# Patient Record
Sex: FEMALE | Race: WHITE | NOT HISPANIC OR LATINO | URBAN - METROPOLITAN AREA
[De-identification: names, ages, dates, MRNs, and addresses within clinical notes are randomized per-mention and may not be internally consistent; named-entity substitution may affect disease eponyms.]

---

## 2019-02-11 ENCOUNTER — EMERGENCY (EMERGENCY)
Facility: HOSPITAL | Age: 64
LOS: 1 days | Discharge: HOME | End: 2019-02-11
Attending: EMERGENCY MEDICINE | Admitting: EMERGENCY MEDICINE
Payer: MEDICARE

## 2019-02-11 VITALS
OXYGEN SATURATION: 100 % | TEMPERATURE: 98 F | RESPIRATION RATE: 16 BRPM | HEART RATE: 88 BPM | SYSTOLIC BLOOD PRESSURE: 154 MMHG | DIASTOLIC BLOOD PRESSURE: 81 MMHG

## 2019-02-11 VITALS
OXYGEN SATURATION: 99 % | RESPIRATION RATE: 16 BRPM | HEART RATE: 99 BPM | HEIGHT: 62 IN | WEIGHT: 117.95 LBS | SYSTOLIC BLOOD PRESSURE: 137 MMHG | TEMPERATURE: 98 F | DIASTOLIC BLOOD PRESSURE: 85 MMHG

## 2019-02-11 DIAGNOSIS — Z96.649 PRESENCE OF UNSPECIFIED ARTIFICIAL HIP JOINT: Chronic | ICD-10-CM

## 2019-02-11 LAB
ALBUMIN SERPL ELPH-MCNC: 3.6 G/DL — SIGNIFICANT CHANGE UP (ref 3.3–5)
ALP SERPL-CCNC: 88 U/L — SIGNIFICANT CHANGE UP (ref 40–120)
ALT FLD-CCNC: 13 U/L — SIGNIFICANT CHANGE UP (ref 12–78)
ANION GAP SERPL CALC-SCNC: 7 MMOL/L — SIGNIFICANT CHANGE UP (ref 5–17)
APTT BLD: 30.2 SEC — SIGNIFICANT CHANGE UP (ref 28.5–37)
AST SERPL-CCNC: 6 U/L — LOW (ref 15–37)
BASOPHILS # BLD AUTO: 0.01 K/UL — SIGNIFICANT CHANGE UP (ref 0–0.2)
BASOPHILS NFR BLD AUTO: 0.1 % — SIGNIFICANT CHANGE UP (ref 0–2)
BILIRUB SERPL-MCNC: 0.4 MG/DL — SIGNIFICANT CHANGE UP (ref 0.2–1.2)
BUN SERPL-MCNC: 10 MG/DL — SIGNIFICANT CHANGE UP (ref 7–23)
CALCIUM SERPL-MCNC: 8.4 MG/DL — LOW (ref 8.5–10.1)
CHLORIDE SERPL-SCNC: 95 MMOL/L — LOW (ref 96–108)
CK MB CFR SERPL CALC: <1 NG/ML — SIGNIFICANT CHANGE UP (ref 0–3.6)
CO2 SERPL-SCNC: 29 MMOL/L — SIGNIFICANT CHANGE UP (ref 22–31)
CREAT SERPL-MCNC: 0.63 MG/DL — SIGNIFICANT CHANGE UP (ref 0.5–1.3)
D DIMER BLD IA.RAPID-MCNC: <150 NG/ML DDU — SIGNIFICANT CHANGE UP
EOSINOPHIL # BLD AUTO: 0.06 K/UL — SIGNIFICANT CHANGE UP (ref 0–0.5)
EOSINOPHIL NFR BLD AUTO: 0.7 % — SIGNIFICANT CHANGE UP (ref 0–6)
GLUCOSE SERPL-MCNC: 95 MG/DL — SIGNIFICANT CHANGE UP (ref 70–99)
HCT VFR BLD CALC: 35.4 % — SIGNIFICANT CHANGE UP (ref 34.5–45)
HGB BLD-MCNC: 11.5 G/DL — SIGNIFICANT CHANGE UP (ref 11.5–15.5)
IMM GRANULOCYTES NFR BLD AUTO: 0.5 % — SIGNIFICANT CHANGE UP (ref 0–1.5)
INR BLD: 1.07 RATIO — SIGNIFICANT CHANGE UP (ref 0.88–1.16)
LACTATE SERPL-SCNC: 1.7 MMOL/L — SIGNIFICANT CHANGE UP (ref 0.7–2)
LYMPHOCYTES # BLD AUTO: 1.38 K/UL — SIGNIFICANT CHANGE UP (ref 1–3.3)
LYMPHOCYTES # BLD AUTO: 15.6 % — SIGNIFICANT CHANGE UP (ref 13–44)
MCHC RBC-ENTMCNC: 28.5 PG — SIGNIFICANT CHANGE UP (ref 27–34)
MCHC RBC-ENTMCNC: 32.5 GM/DL — SIGNIFICANT CHANGE UP (ref 32–36)
MCV RBC AUTO: 87.6 FL — SIGNIFICANT CHANGE UP (ref 80–100)
MONOCYTES # BLD AUTO: 0.86 K/UL — SIGNIFICANT CHANGE UP (ref 0–0.9)
MONOCYTES NFR BLD AUTO: 9.7 % — SIGNIFICANT CHANGE UP (ref 2–14)
NEUTROPHILS # BLD AUTO: 6.51 K/UL — SIGNIFICANT CHANGE UP (ref 1.8–7.4)
NEUTROPHILS NFR BLD AUTO: 73.4 % — SIGNIFICANT CHANGE UP (ref 43–77)
NRBC # BLD: 0 /100 WBCS — SIGNIFICANT CHANGE UP (ref 0–0)
NT-PROBNP SERPL-SCNC: 363 PG/ML — HIGH (ref 0–125)
PLATELET # BLD AUTO: 440 K/UL — HIGH (ref 150–400)
POTASSIUM SERPL-MCNC: 4.5 MMOL/L — SIGNIFICANT CHANGE UP (ref 3.5–5.3)
POTASSIUM SERPL-SCNC: 4.5 MMOL/L — SIGNIFICANT CHANGE UP (ref 3.5–5.3)
PROT SERPL-MCNC: 7.6 G/DL — SIGNIFICANT CHANGE UP (ref 6–8.3)
PROTHROM AB SERPL-ACNC: 12.2 SEC — SIGNIFICANT CHANGE UP (ref 10–12.9)
RBC # BLD: 4.04 M/UL — SIGNIFICANT CHANGE UP (ref 3.8–5.2)
RBC # FLD: 12.5 % — SIGNIFICANT CHANGE UP (ref 10.3–14.5)
SODIUM SERPL-SCNC: 131 MMOL/L — LOW (ref 135–145)
TROPONIN I SERPL-MCNC: <.015 NG/ML — SIGNIFICANT CHANGE UP (ref 0.01–0.04)
WBC # BLD: 8.86 K/UL — SIGNIFICANT CHANGE UP (ref 3.8–10.5)
WBC # FLD AUTO: 8.86 K/UL — SIGNIFICANT CHANGE UP (ref 3.8–10.5)

## 2019-02-11 PROCEDURE — 85027 COMPLETE CBC AUTOMATED: CPT

## 2019-02-11 PROCEDURE — 84484 ASSAY OF TROPONIN QUANT: CPT

## 2019-02-11 PROCEDURE — 93010 ELECTROCARDIOGRAM REPORT: CPT

## 2019-02-11 PROCEDURE — 85610 PROTHROMBIN TIME: CPT

## 2019-02-11 PROCEDURE — 83880 ASSAY OF NATRIURETIC PEPTIDE: CPT

## 2019-02-11 PROCEDURE — 87040 BLOOD CULTURE FOR BACTERIA: CPT

## 2019-02-11 PROCEDURE — 99284 EMERGENCY DEPT VISIT MOD MDM: CPT | Mod: 25

## 2019-02-11 PROCEDURE — 71046 X-RAY EXAM CHEST 2 VIEWS: CPT

## 2019-02-11 PROCEDURE — 82553 CREATINE MB FRACTION: CPT

## 2019-02-11 PROCEDURE — 83605 ASSAY OF LACTIC ACID: CPT

## 2019-02-11 PROCEDURE — 85379 FIBRIN DEGRADATION QUANT: CPT

## 2019-02-11 PROCEDURE — 80053 COMPREHEN METABOLIC PANEL: CPT

## 2019-02-11 PROCEDURE — 85730 THROMBOPLASTIN TIME PARTIAL: CPT

## 2019-02-11 PROCEDURE — 71046 X-RAY EXAM CHEST 2 VIEWS: CPT | Mod: 26

## 2019-02-11 PROCEDURE — 94640 AIRWAY INHALATION TREATMENT: CPT

## 2019-02-11 PROCEDURE — 99284 EMERGENCY DEPT VISIT MOD MDM: CPT

## 2019-02-11 PROCEDURE — 93005 ELECTROCARDIOGRAM TRACING: CPT

## 2019-02-11 PROCEDURE — 36415 COLL VENOUS BLD VENIPUNCTURE: CPT

## 2019-02-11 RX ORDER — DEXTROMETHORPHAN HYDROBROMIDE AND PROMETHAZINE HYDROCHLORIDE 15; 6.25 MG/5ML; MG/5ML
5 SYRUP ORAL
Qty: 100 | Refills: 0
Start: 2019-02-11 | End: 2019-02-15

## 2019-02-11 RX ORDER — IPRATROPIUM/ALBUTEROL SULFATE 18-103MCG
3 AEROSOL WITH ADAPTER (GRAM) INHALATION ONCE
Qty: 0 | Refills: 0 | Status: COMPLETED | OUTPATIENT
Start: 2019-02-11 | End: 2019-02-11

## 2019-02-11 RX ORDER — OMEPRAZOLE 10 MG/1
1 CAPSULE, DELAYED RELEASE ORAL
Qty: 30 | Refills: 0
Start: 2019-02-11 | End: 2019-03-12

## 2019-02-11 RX ORDER — ALPRAZOLAM 0.25 MG
1 TABLET ORAL
Qty: 10 | Refills: 0 | OUTPATIENT
Start: 2019-02-11 | End: 2019-02-15

## 2019-02-11 RX ORDER — SODIUM CHLORIDE 9 MG/ML
1000 INJECTION INTRAMUSCULAR; INTRAVENOUS; SUBCUTANEOUS ONCE
Qty: 0 | Refills: 0 | Status: COMPLETED | OUTPATIENT
Start: 2019-02-11 | End: 2019-02-11

## 2019-02-11 RX ADMIN — Medication 3 MILLILITER(S): at 13:33

## 2019-02-11 RX ADMIN — SODIUM CHLORIDE 1000 MILLILITER(S): 9 INJECTION INTRAMUSCULAR; INTRAVENOUS; SUBCUTANEOUS at 13:33

## 2019-02-11 NOTE — ED PROVIDER NOTE - PROGRESS NOTE DETAILS
patient resting comfortably in bed, oral pharynx clear, lungs clear, labs, chest xray reviwed, no acute findings, dc home, f/u with PMD

## 2019-02-11 NOTE — ED ADULT NURSE NOTE - OBJECTIVE STATEMENT
Pt presents to the ED s/p cough. Pt is having to continuously spit in tissues, states" I feel like I have to do this." Pt denies coughing, difficulty breathing , difficulty swallowing.

## 2019-02-11 NOTE — ED PROVIDER NOTE - CARE PLAN
Principal Discharge DX:	Sputum production Principal Discharge DX:	Sputum production  Secondary Diagnosis:	Gastroesophageal reflux disease, esophagitis presence not specified

## 2019-02-11 NOTE — ED PROVIDER NOTE - OBJECTIVE STATEMENT
63 female presents to ER c/o spitting up sputum, states last week went to urgent care for cold like symptoms, prescribed z-pack, taking mucinex DM, flonase, states her congestion improved but still spitting up sputum. Denies fever, no shortness of breath.

## 2019-02-11 NOTE — ED ADULT NURSE NOTE - NSIMPLEMENTINTERV_GEN_ALL_ED
Implemented All Fall Risk Interventions:  [place] to call system. Call bell, personal items and telephone within reach. Instruct patient to call for assistance. Room bathroom lighting operational. Non-slip footwear when patient is off stretcher. Physically safe environment: no spills, clutter or unnecessary equipment. Stretcher in lowest position, wheels locked, appropriate side rails in place. Provide visual cue, wrist band, yellow gown, etc. Monitor gait and stability. Monitor for mental status changes and reorient to person, place, and time. Review medications for side effects contributing to fall risk. Reinforce activity limits and safety measures with patient and family.

## 2019-02-16 LAB
CULTURE RESULTS: SIGNIFICANT CHANGE UP
CULTURE RESULTS: SIGNIFICANT CHANGE UP
SPECIMEN SOURCE: SIGNIFICANT CHANGE UP
SPECIMEN SOURCE: SIGNIFICANT CHANGE UP

## 2019-03-18 NOTE — ED ADULT TRIAGE NOTE - HEIGHT IN CM
[facility] is calling to request copy of pre op with any labs and/or EKG done 03/11/19, FAX: [phone]. Thank  You.  
Pre-Op has been faxed to [facility] at [phone]. Praveena Sawant TC    
[phone]

## 2021-10-23 ENCOUNTER — INPATIENT (INPATIENT)
Facility: HOSPITAL | Age: 66
LOS: 3 days | Discharge: ANOTHER HOSPITAL | DRG: 552 | End: 2021-10-27
Attending: FAMILY MEDICINE | Admitting: FAMILY MEDICINE
Payer: MEDICARE

## 2021-10-23 VITALS
RESPIRATION RATE: 18 BRPM | WEIGHT: 119.93 LBS | HEIGHT: 62 IN | DIASTOLIC BLOOD PRESSURE: 77 MMHG | HEART RATE: 90 BPM | TEMPERATURE: 99 F | SYSTOLIC BLOOD PRESSURE: 153 MMHG | OXYGEN SATURATION: 98 %

## 2021-10-23 DIAGNOSIS — M54.9 DORSALGIA, UNSPECIFIED: ICD-10-CM

## 2021-10-23 DIAGNOSIS — Z96.649 PRESENCE OF UNSPECIFIED ARTIFICIAL HIP JOINT: Chronic | ICD-10-CM
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
## 2021-10-23 DIAGNOSIS — G40.909 EPILEPSY, UNSPECIFIED, NOT INTRACTABLE, WITHOUT STATUS EPILEPTICUS: ICD-10-CM

## 2021-10-23 DIAGNOSIS — Z85.841 PERSONAL HISTORY OF MALIGNANT NEOPLASM OF BRAIN: ICD-10-CM

## 2021-10-23 DIAGNOSIS — S32.009A UNSPECIFIED FRACTURE OF UNSPECIFIED LUMBAR VERTEBRA, INITIAL ENCOUNTER FOR CLOSED FRACTURE: ICD-10-CM

## 2021-10-23 PROBLEM — C43.9 MALIGNANT MELANOMA OF SKIN, UNSPECIFIED: Chronic | Status: ACTIVE | Noted: 2019-02-11

## 2021-10-23 PROBLEM — C50.919 MALIGNANT NEOPLASM OF UNSPECIFIED SITE OF UNSPECIFIED FEMALE BREAST: Chronic | Status: ACTIVE | Noted: 2019-02-11

## 2021-10-23 PROBLEM — R56.9 UNSPECIFIED CONVULSIONS: Chronic | Status: ACTIVE | Noted: 2019-02-11

## 2021-10-23 PROBLEM — C71.9 MALIGNANT NEOPLASM OF BRAIN, UNSPECIFIED: Chronic | Status: ACTIVE | Noted: 2019-02-11

## 2021-10-23 LAB
ALBUMIN SERPL ELPH-MCNC: 3.3 G/DL — SIGNIFICANT CHANGE UP (ref 3.3–5)
ALP SERPL-CCNC: 121 U/L — HIGH (ref 40–120)
ALT FLD-CCNC: 20 U/L — SIGNIFICANT CHANGE UP (ref 12–78)
ANION GAP SERPL CALC-SCNC: 5 MMOL/L — SIGNIFICANT CHANGE UP (ref 5–17)
APPEARANCE UR: CLEAR — SIGNIFICANT CHANGE UP
AST SERPL-CCNC: 22 U/L — SIGNIFICANT CHANGE UP (ref 15–37)
BASOPHILS # BLD AUTO: 0.02 K/UL — SIGNIFICANT CHANGE UP (ref 0–0.2)
BASOPHILS NFR BLD AUTO: 0.3 % — SIGNIFICANT CHANGE UP (ref 0–2)
BILIRUB SERPL-MCNC: 0.3 MG/DL — SIGNIFICANT CHANGE UP (ref 0.2–1.2)
BILIRUB UR-MCNC: NEGATIVE — SIGNIFICANT CHANGE UP
BUN SERPL-MCNC: 17 MG/DL — SIGNIFICANT CHANGE UP (ref 7–23)
CALCIUM SERPL-MCNC: 8.7 MG/DL — SIGNIFICANT CHANGE UP (ref 8.5–10.1)
CHLORIDE SERPL-SCNC: 109 MMOL/L — HIGH (ref 96–108)
CO2 SERPL-SCNC: 30 MMOL/L — SIGNIFICANT CHANGE UP (ref 22–31)
COLOR SPEC: YELLOW — SIGNIFICANT CHANGE UP
CREAT SERPL-MCNC: 0.73 MG/DL — SIGNIFICANT CHANGE UP (ref 0.5–1.3)
DIFF PNL FLD: ABNORMAL
EOSINOPHIL # BLD AUTO: 0.12 K/UL — SIGNIFICANT CHANGE UP (ref 0–0.5)
EOSINOPHIL NFR BLD AUTO: 1.6 % — SIGNIFICANT CHANGE UP (ref 0–6)
GLUCOSE SERPL-MCNC: 92 MG/DL — SIGNIFICANT CHANGE UP (ref 70–99)
GLUCOSE UR QL: NEGATIVE — SIGNIFICANT CHANGE UP
HCT VFR BLD CALC: 38.4 % — SIGNIFICANT CHANGE UP (ref 34.5–45)
HGB BLD-MCNC: 11.9 G/DL — SIGNIFICANT CHANGE UP (ref 11.5–15.5)
IMM GRANULOCYTES NFR BLD AUTO: 0.3 % — SIGNIFICANT CHANGE UP (ref 0–1.5)
KETONES UR-MCNC: NEGATIVE — SIGNIFICANT CHANGE UP
LEUKOCYTE ESTERASE UR-ACNC: ABNORMAL
LYMPHOCYTES # BLD AUTO: 1.11 K/UL — SIGNIFICANT CHANGE UP (ref 1–3.3)
LYMPHOCYTES # BLD AUTO: 14.7 % — SIGNIFICANT CHANGE UP (ref 13–44)
MCHC RBC-ENTMCNC: 28.6 PG — SIGNIFICANT CHANGE UP (ref 27–34)
MCHC RBC-ENTMCNC: 31 GM/DL — LOW (ref 32–36)
MCV RBC AUTO: 92.3 FL — SIGNIFICANT CHANGE UP (ref 80–100)
MONOCYTES # BLD AUTO: 0.72 K/UL — SIGNIFICANT CHANGE UP (ref 0–0.9)
MONOCYTES NFR BLD AUTO: 9.5 % — SIGNIFICANT CHANGE UP (ref 2–14)
NEUTROPHILS # BLD AUTO: 5.58 K/UL — SIGNIFICANT CHANGE UP (ref 1.8–7.4)
NEUTROPHILS NFR BLD AUTO: 73.6 % — SIGNIFICANT CHANGE UP (ref 43–77)
NITRITE UR-MCNC: NEGATIVE — SIGNIFICANT CHANGE UP
NRBC # BLD: 0 /100 WBCS — SIGNIFICANT CHANGE UP (ref 0–0)
PH UR: 6 — SIGNIFICANT CHANGE UP (ref 5–8)
PLATELET # BLD AUTO: 403 K/UL — HIGH (ref 150–400)
POTASSIUM SERPL-MCNC: 5.1 MMOL/L — SIGNIFICANT CHANGE UP (ref 3.5–5.3)
POTASSIUM SERPL-SCNC: 5.1 MMOL/L — SIGNIFICANT CHANGE UP (ref 3.5–5.3)
PROT SERPL-MCNC: 7.4 G/DL — SIGNIFICANT CHANGE UP (ref 6–8.3)
PROT UR-MCNC: NEGATIVE — SIGNIFICANT CHANGE UP
RBC # BLD: 4.16 M/UL — SIGNIFICANT CHANGE UP (ref 3.8–5.2)
RBC # FLD: 12.5 % — SIGNIFICANT CHANGE UP (ref 10.3–14.5)
SARS-COV-2 RNA SPEC QL NAA+PROBE: SIGNIFICANT CHANGE UP
SODIUM SERPL-SCNC: 144 MMOL/L — SIGNIFICANT CHANGE UP (ref 135–145)
SP GR SPEC: 1.01 — SIGNIFICANT CHANGE UP (ref 1.01–1.02)
UROBILINOGEN FLD QL: NEGATIVE — SIGNIFICANT CHANGE UP
WBC # BLD: 7.57 K/UL — SIGNIFICANT CHANGE UP (ref 3.8–10.5)
WBC # FLD AUTO: 7.57 K/UL — SIGNIFICANT CHANGE UP (ref 3.8–10.5)

## 2021-10-23 PROCEDURE — 72128 CT CHEST SPINE W/O DYE: CPT | Mod: 26,MG

## 2021-10-23 PROCEDURE — 72070 X-RAY EXAM THORAC SPINE 2VWS: CPT | Mod: 26

## 2021-10-23 PROCEDURE — G1004: CPT

## 2021-10-23 PROCEDURE — 72131 CT LUMBAR SPINE W/O DYE: CPT | Mod: 26,ME

## 2021-10-23 PROCEDURE — 72100 X-RAY EXAM L-S SPINE 2/3 VWS: CPT | Mod: 26

## 2021-10-23 PROCEDURE — 71110 X-RAY EXAM RIBS BIL 3 VIEWS: CPT | Mod: 26

## 2021-10-23 PROCEDURE — 99285 EMERGENCY DEPT VISIT HI MDM: CPT

## 2021-10-23 RX ORDER — DOCUSATE SODIUM 100 MG
1 CAPSULE ORAL
Qty: 20 | Refills: 0
Start: 2021-10-23 | End: 2021-11-01

## 2021-10-23 RX ORDER — OXYCODONE AND ACETAMINOPHEN 5; 325 MG/1; MG/1
1 TABLET ORAL ONCE
Refills: 0 | Status: DISCONTINUED | OUTPATIENT
Start: 2021-10-23 | End: 2021-10-23

## 2021-10-23 RX ORDER — METHOCARBAMOL 500 MG/1
1500 TABLET, FILM COATED ORAL ONCE
Refills: 0 | Status: COMPLETED | OUTPATIENT
Start: 2021-10-23 | End: 2021-10-23

## 2021-10-23 RX ORDER — KETOROLAC TROMETHAMINE 30 MG/ML
30 SYRINGE (ML) INJECTION ONCE
Refills: 0 | Status: DISCONTINUED | OUTPATIENT
Start: 2021-10-23 | End: 2021-10-23

## 2021-10-23 RX ADMIN — METHOCARBAMOL 1500 MILLIGRAM(S): 500 TABLET, FILM COATED ORAL at 12:40

## 2021-10-23 RX ADMIN — OXYCODONE AND ACETAMINOPHEN 1 TABLET(S): 5; 325 TABLET ORAL at 14:43

## 2021-10-23 RX ADMIN — Medication 30 MILLIGRAM(S): at 12:40

## 2021-10-23 NOTE — ED PROVIDER NOTE - PROGRESS NOTE DETAILS
Call received from neuroradiologist Dr Monique who reports buckle fracture of L1 and L@ nondisplaced, lucent foci though out but likely degenerative changes and less liekly mets but recommends MRI in setting of hx CA. Reevaluated patient at bedside.  Patient reports still in pain.  Discussed the results of all diagnostic testing in ED and n opportunity to ask questions was given.  Case dw ortho at 1416 who will be in to see pt shortly Will give percocet and reassess Pt evaluated by ortho and cleared for dishcarge with follow up spine outpaitent for MRI. Pt feeling better and agreeable with plan for discharge. Pt evaluated by ortho and cleared for discharge with follow up spine outpatient for MRI. Pt feeling better and agreeable with plan for discharge. Return call received from ortho resident who now advised pt to stay for admission for MRI   Ortho team to speak to son and patient in regards to change of dispo Meena discussed with dr kamara who accpets pt for admission

## 2021-10-23 NOTE — ED PROVIDER NOTE - CARE PROVIDER_API CALL
Sebastian Gould  ORTHOPAEDIC SURGERY  [facility], [address] [place] [address]  Phone: [phone]  Fax: [phone]  Follow Up Time: 1-3 Days

## 2021-10-23 NOTE — ED PROVIDER NOTE - DATE/TIME 2
Spine appears normal, range of motion is not limited, no muscle or joint tenderness
23-Oct-2021 14:25

## 2021-10-23 NOTE — ED PROVIDER NOTE - PATIENT PORTAL LINK FT
You can access the FollowMyHealth Patient Portal offered by [facility] by registering at the following website: [email or web address]. By joining [facility]’s FollowMyHealth portal, you will also be able to view your health information using other applications (apps) compatible with our system.

## 2021-10-23 NOTE — ED ADULT NURSE NOTE - CHIEF COMPLAINT QUOTE
patient cam [facility] ED with c/o lower back pain X 3 weeks ago, tripped and fall over an extension cord. denies head injury.

## 2021-10-23 NOTE — ED PROVIDER NOTE - NSFOLLOWUPINSTRUCTIONS_ED_ALL_ED_FT
Back Pain    Back pain is very common in adults. The cause of back pain is rarely dangerous and the pain often gets better over time. The cause of your back pain may not be known and may include strain of muscles or ligaments, degeneration of the spinal disks, or arthritis. Occasionally the pain may radiate down your leg(s). Over-the-counter medicines to reduce pain and inflammation are often the most helpful. Stretching and remaining active frequently helps the healing process.     SEEK IMMEDIATE MEDICAL CARE IF YOU HAVE ANY OF THE FOLLOWING SYMPTOMS: bowel or bladder control problems, unusual weakness or numbness in your arms or legs, nausea or vomiting, abdominal pain, fever, dizziness/lightheadedness.      1. TAKE ALL MEDICATIONS AS DIRECTED.  REST APPLY ICE OR HEAT AS NEEDED. FOR PAIN YOU CAN ALSO TAKE IBUPROFEN (MOTRIN, ADVIL) OR ACETAMINOPHEN (TYLENOL) AS NEEDED, AS DIRECTED ON PACKAGING.  2. FOLLOW UP WITH ____SPINE______ AS DIRECTED.  YOU WERE GIVEN COPIES OF ALL LABS AND IMAGING RESULTS FROM YOUR ER VISIT--PLEASE TAKE THEM WITH YOU TO YOUR APPOINTMENT.  3. IF NEEDED, CALL [phone] TO FIND A PRIMARY CARE PHYSICIAN.  OR CALL [phone] TO MAKE AN APPOINTMENT WITH THE MEDICAL CLINIC.  4. RETURN TO THE ER FOR ANY WORSENING SYMPTOMS.

## 2021-10-23 NOTE — ED PROVIDER NOTE - OBJECTIVE STATEMENT
65 y/o F with PMH breast CA (now in remission), benign brain tumor s/p resection, skin CA, seizure presents to ED for c/o back pain. Pt states trip and fall 3 weeks ago landing on back. Was taking flexeril since then with minimal relief and now feels pain worsening. Pain is in lower and mid back and radiates around her stomach. Denies n/v/d fever chills. Denies numbness and tingling/ weakness. No saddle anesthesia or difficult with urination/ BM. Pt taking ibupofren last dose 1030 am.     PCP Dr Moseley Pt is vaccinated for COVID - moderna ;ast dose March

## 2021-10-23 NOTE — CONSULT NOTE ADULT - ASSESSMENT
66F with L1, L2 TP fractures.     Plan:   Medical management appreciated.   Given unremarkable exam and fracture pattern, no need for surgical intervention at this time.   WBAT/PT/OT  Pain management PRN  No acute orthopaedic surgical intervention indicated at this time. This patient is orthopaedically stable for discharge.   Patient to follow up with Dr. Beach as an outpatient for further evaluation and management.   All of the patient's questions and concerns were answered and addressed.   Will discuss with Dr. Beach, and will advise for any changes to the plan.    66F with L1, L2 TP fractures.     ******INCOMPLETE NOTE IN PROGRESS******  ******INCOMPLETE NOTE IN PROGRESS******  ******INCOMPLETE NOTE IN PROGRESS******    Plan:   Medical management appreciated.   Given unremarkable exam and fracture pattern, no need for surgical intervention at this time.   WBAT  PT/OT  Pain management PRN  Recommend Outpatient MRI w/wo contrast of TSp/LSp  No acute orthopaedic surgical intervention indicated at this time. This patient is orthopaedically stable for discharge.   Patient to follow up with Dr. Beach as an outpatient for further evaluation and management.   All of the patient's questions and concerns were answered and addressed.   Will discuss with Dr. Beach, and will advise for any changes to the plan.    66F with Nondisplaced Subacute left L1 & L2 TP fractures, mild age-indeterminant VCF T12, hypertrophic ligamentum flavum, minimal left lat subluxation T11-T12, Break in ALL ossification    ******INCOMPLETE NOTE IN PROGRESS******  ******INCOMPLETE NOTE IN PROGRESS******  ******INCOMPLETE NOTE IN PROGRESS******    Plan:   Medical management appreciated.   Given unremarkable exam no acute surgical intervention at this time.   PT/OT  Pain management PRN  Outpatient MRI w/wo contrast of TSp/LSp after follow up with [facility]ine   No acute orthopaedic surgical intervention indicated at this time. This patient is orthopaedically stable for discharge.   Patient to follow up with Dr. Beach as an outpatient for further evaluation and management.   All of the patient's questions and concerns were answered and addressed.   Will discuss with Dr. Beach, and will advise for any changes to the plan.    66F with Nondisplaced Subacute left L1 & L2 TP fractures, mild age-indeterminant VCF T12, hypertrophic ligamentum flavum, minimal left lat subluxation T11-T12, Break in ALL ossification    Plan:   Case discussed w/ Dr Beach  Imaging with findings suspicious for unstable spine, MR rec by Radiologist  Rec admission to [facility] for inpatient MR  Medical management appreciated.   Bedrest 2/2 to suspected unstable spine  Pain management PRN  Further recs pending advanced imaging  All of the patient's questions and concerns were answered and addressed.

## 2021-10-23 NOTE — H&P ADULT - NEGATIVE NEUROLOGICAL SYMPTOMS
no transient paralysis/no paresthesias/no generalized seizures/no focal seizures/no syncope/no tremors/no vertigo

## 2021-10-23 NOTE — ED PROVIDER NOTE - PHYSICAL EXAMINATION
PE:   GEN: Awake, alert, interactive, NAD, non-toxic appearing.   HEAD: Atraumatic  EYES: Sclera white, conjunctiva pink, PERRLA  CARDIAC: Reg rate and rhythm, S1,S2, no murmur/rub/gallop. Strong central and peripheral pulses, Brisk cap refill, no evident pedal edema.   RESP: No distress noted. L/S clear = Bilat without accessory muscle use, wheeze, rhonchi, rales.   ABD: soft, supple, no guarding. BS x 4, normoactive.   NEURO: AOx3, CN II-XII grossly intact without focal deficit. sensation intact, strength 5/5 and equal in BL upper and lower extremities, pelvis stable, + TTTP thoracic spine no c spine ttp +paraspinal lumbar TTP   MSK: Moving all extremities with no apparent deformities.   SKIN: Warm, dry, normal color, without apparent rashes.

## 2021-10-23 NOTE — H&P ADULT - NSHPLABSRESULTS_GEN_ALL_CORE
11.9   7.57  )-----------( 403      ( 23 Oct 2021 11:42 )             38.4     23 Oct 2021 11:42    144    |  109    |  17     ----------------------------<  92     5.1     |  30     |  0.73     Ca    8.7        23 Oct 2021 11:42    TPro  7.4    /  Alb  3.3    /  TBili  0.3    /  DBili  x      /  AST  22     /  ALT  20     /  AlkPhos  121    23 Oct 2021 11:42    LIVER FUNCTIONS - ( 23 Oct 2021 11:42 )  Alb: 3.3 g/dL / Pro: 7.4 g/dL / ALK PHOS: 121 U/L / ALT: 20 U/L / AST: 22 U/L / GGT: x             CAPILLARY BLOOD GLUCOSE    Urinalysis Basic - ( 23 Oct 2021 16:16 )    Color: Yellow / Appearance: Clear / S.010 / pH: x  Gluc: x / Ketone: Negative  / Bili: Negative / Urobili: Negative   Blood: x / Protein: Negative / Nitrite: Negative   Leuk Esterase: Trace / RBC: 3-5 /HPF / WBC 11-25   Sq Epi: x / Non Sq Epi: Few / Bacteria: Few    < from: CT Lumbar Spine No Cont (10.23.21 @ 12:30) >    THORACIC SPINE:  1.  No acute fracture within the thoracic spine, within limitations of osteopenia.  2.  Slight left lateral subluxation of T11 on T12, likely degenerative in nature given vacuum disc phenomenon at T11-T12 and break in anterior longitudinal ligament ossification. Acute traumatic ligamentous injury in this region is less likely, but correlation with point tenderness and consider further evaluation with MRI as clinically warranted.  3.  Multilevel degenerative changes, including small Schmorl's nodes at superior inferior T12 endplates.    LUMBAR SPINE:  1.  Nondisplaced cortical buckle fractures at left L1 transverse process, greater than left L2 transverse process, age indeterminate butpossibly acute to subacute. Correlation with point tenderness in this region is recommended. No adjacent soft tissue contusion/edema identified.  2.  Consider further evaluation with MRI of this high clinical suspicion for acute traumatic fracture orligamentous injury.  3.  Subtle lucent foci throughout the thoracic and lumbar vertebra, likely degenerative/demineralization change. Further evaluation with contrast enhanced MRI can be considered if there is clinical suspicion for osseous metastasis given history of breast cancer.  4.  Multilevel degenerative changes, as detailed above.    < end of copied text > 11.9   7.57  )-----------( 403      ( 23 Oct 2021 11:42 )             38.4     23 Oct 2021 11:42    144    |  109    |  17     ----------------------------<  92     5.1     |  30     |  0.73     Ca    8.7        23 Oct 2021 11:42    TPro  7.4    /  Alb  3.3    /  TBili  0.3    /  DBili  x      /  AST  22     /  ALT  20     /  AlkPhos  121    23 Oct 2021 11:42    LIVER FUNCTIONS - ( 23 Oct 2021 11:42 )  Alb: 3.3 g/dL / Pro: 7.4 g/dL / ALK PHOS: 121 U/L / ALT: 20 U/L / AST: 22 U/L / GGT: x             CAPILLARY BLOOD GLUCOSE    Urinalysis Basic - ( 23 Oct 2021 16:16 )    Color: Yellow / Appearance: Clear / S.010 / pH: x  Gluc: x / Ketone: Negative  / Bili: Negative / Urobili: Negative   Blood: x / Protein: Negative / Nitrite: Negative   Leuk Esterase: Trace / RBC: 3-5 /HPF / WBC 11-25   Sq Epi: x / Non Sq Epi: Few / Bacteria: Few    < from: CT Lumbar Spine No Cont (10.23.21 @ 12:30) >    THORACIC SPINE:  1.  No acute fracture within the thoracic spine, within limitations of osteopenia.  2.  Slight left lateral subluxation of T11 on T12, likely degenerative in nature given vacuum disc phenomenon at T11-T12 and break in anterior longitudinal ligament ossification. Acute traumatic ligamentous injury in this region is less likely, but correlation with point tenderness and consider further evaluation with MRI as clinically warranted.  3.  Multilevel degenerative changes, including small Schmorl's nodes at superior inferior T12 endplates.    LUMBAR SPINE:  1.  Nondisplaced cortical buckle fractures at left L1 transverse process, greater than left L2 transverse process, age indeterminate but possibly acute to subacute. Correlation with point tenderness in this region is recommended. No adjacent soft tissue contusion/edema identified.  2.  Consider further evaluation with MRI of this high clinical suspicion for acute traumatic fracture or ligamentous injury.  3.  Subtle lucent foci throughout the thoracic and lumbar vertebra, likely degenerative/demineralization change. Further evaluation with contrast enhanced MRI can be considered if there is clinical suspicion for osseous metastasis given history of breast cancer.  4.  Multilevel degenerative changes, as detailed above.    < end of copied text >

## 2021-10-23 NOTE — ED PROVIDER NOTE - CLINICAL SUMMARY MEDICAL DECISION MAKING FREE TEXT BOX
65 y/o F with PMH breast CA and skin CA presents to ED for back pain s/p fall 3 weeks ago, now worsening and wraps around abdomen, taking ibuprofen and flexeril with little relief, no saddle anesthesia no weakness or numbness, + TTP thoracic spine , plan= labs urine to r/o UTI despite no urinary symptoms, CT t and L spine as pt has hx ca to r/o mets/ fracture

## 2021-10-23 NOTE — ED PROVIDER NOTE - CARE PLAN
Principal Discharge DX:	Fracture of transverse process of lumbar vertebra  Secondary Diagnosis:	Back pain   1

## 2021-10-23 NOTE — H&P ADULT - RS GEN PE MLT RESP DETAILS PC
airway patent/breath sounds equal/good air movement/respirations non-labored/clear to auscultation bilaterally/diminished breath sounds, L/diminished breath sounds, R

## 2021-10-23 NOTE — H&P ADULT - ASSESSMENT
BRIANNE BARNES is a 65 YO Female  presented to ED for a severe back pain. Patient states that trip and fall 3 weeks ago landing on back.

## 2021-10-23 NOTE — CONSULT NOTE ADULT - SUBJECTIVE AND OBJECTIVE BOX
66F hx breast CA in remission, benign brain CA S/p excision, skin CA, seizure presents to ED with back pain. Patient is a community ambulator with cane and lives with her son and daughter in law. Patient notes that about three weeks ago, she was walking in her home when she tripped over some power cords and landed on her back. She noted immediate back pain but was able to continue walking and performing ADLs. She went to seek medical attention a few days after and was prescribed a muscle relaxant which helped with her pain. However, about a week ago, she tried to stop using her muscle relaxer and began to start experiencing pain in the lower back that would radiate around to the abdomen. She made an appointment with Dr. Link of [facility] for next Tuesday, but the pain became so bad that she had to come to the ED today. Patient denies leg pain, saddle anesthesia, bowel or bladder incontinence, numbness, tingling, weakness, or any other orthopaedic complaint.     Exam:   T(C): 37 (10-23-21 @ 11:08), Max: 37 (10-23-21 @ 11:08)  T(F): 98.6 (10-23-21 @ 11:08), Max: 98.6 (10-23-21 @ 11:08)  HR: 90 (10-23-21 @ 11:08) (90 - 90)  BP: 153/77 (10-23-21 @ 11:08) (153/77 - 153/77)  RR: 18 (10-23-21 @ 11:08) (18 - 18)  SpO2: 98% (10-23-21 @ 11:08) (98% - 98%)    Gen: resting in bed, in pain  Spine:   Skin intact. No edema, erythema, or lesions of the skin. No visualized deformity.   TTP over the left paraspinal and midline areas in the thoracic region and the midline and right paraspinal regions in the lumbar spine. TTP with deep palpation to the left and right of the umbilicus.   Negative Marsh's, negative Babinksi, negative myoclonus.   DP, radial pulses palpable.  No calf tenderness bilaterally.  Compartments soft and compressible.     Motor:                   C5                C6              C7               C8           T1   R            5/5                5/5            5/5             5/5          5/5  L             5/5               5/5             5/5             5/5          5/5                L2             L3             L4               L5            S1  R         5/5           5/5          5/5             5/5           5/5  L          5/5          5/5           5/5             5/5           5/5    Sensory:            C5         C6         C7      C8       T1        (0=absent, 1=impaired, 2=normal, NT=not testable)  R         2            2           2        2         2  L          2            2           2        2         2               L2          L3         L4      L5       S1         (0=absent, 1=impaired, 2=normal, NT=not testable)  R         2            2            2        2        2  L          2            2           2        2         2      Laboratory Results:   CBC, 10-23-21 @ 11:42    WBC: 7.57  Hgb: 11.9  Hct: 38.4  Plt: 403      Chemistry, 10-23-21 @ 11:42    Na: 144     AST: 22  K: 5.1       ALT: 20  Cl: 109      TProt: 7.4  CO2: 30     Alb: 3.3  BUN: 17     TBili: 0.3  Cr: 0.73      AP: 121  Glu: 92       Mg: --  P: --    eGFR: 98  eGFR, AA: 85        Imaging: CT imaging reviewed demonstrating slight lateral subluxation of T11 on T12; L1 TP fracture, L2 TP fracture.

## 2021-10-23 NOTE — ED PROVIDER NOTE - ATTENDING CONTRIBUTION TO CARE
Pt is a 67 yo female who presents to the ED with a cc of back pain. PMHx of benign brain tumor s/p resection, h/o breast cancer now in remission, h/o melanoma, h/o seizures. Pt reports that approx 3 weeks ago she tripped over some wires and fell landing on her lower back. Denies striking her head, denies LOC. Pt reports that she has been ambulatory since but reports worsening back pain. Pt states that she has been taking Flexeril for the pain with min relief. She reports that the pain has significantly worsened in her lower back and now wraps around her bilateral lower abd region as well. She further reports associated SOB because she reports back pain when taking a deep breath. Denies fever, chills, N/V/D, CP, ext numbness or weakness. Pt reports that the pain does not radiate down her lower ext. Denies loss of bowel or bladder function. Pt reports that she has also been taking Motrin for the pain with min to no relief. On exam pt lying in bed appears to be in moderate pain distress. NCAT, PERRL EOMI, heart RR, lungs CTA, abd soft NT/ND. No  midline cervical TTP. Lower thoracic upper lumbar midline TTP with bilateral lumbar paraspinal muscle TTP. NVI x 4 ext. Pt presenting with back pain s/p mechanical fall 3 weeks ago concern for possible fracture NVI. Will obtain rib x-ray, CT thoracic spine, CT lumbar spine. Will obtain screening labs and medicate for pain. Will consult with orthopedics as needed

## 2021-10-23 NOTE — H&P ADULT - NEUROLOGICAL DETAILS
alert and oriented x 3/sensation intact/deep reflexes intact/cranial nerves intact/no spontaneous movement/superficial reflexes intact/strength decreased

## 2021-10-23 NOTE — ED ADULT TRIAGE NOTE - CHIEF COMPLAINT QUOTE
patient cam [facility] ED with c/o lower back pain X 3 weeks ago, tripped and fall over an extension cord. denies head injury.

## 2021-10-23 NOTE — H&P ADULT - HISTORY OF PRESENT ILLNESS
Chart and labs  reviewed. Chart and labs  reviewed.  BRIANNE BARNES is a 67 YO Female  presents to ED for c/o back pain. Pt states trip and fall 3 weeks ago landing on back. Was taking flexeril since then with minimal relief and now feels pain worsening. Pain is in lower and mid back and radiates around her stomach. Denies n/v/d fever chills. Denies numbness and tingling/ weakness. No saddle anesthesia or difficult with urination/ BM. Pt taking ibupofren last dose 1030 am.      Pt is vaccinated for COVID - moderna ;ast dose March    complaining of back pain general.  Patient with PMH breast CA (now in remission), benign brain tumor s/p resection, skin CA, seizure Chart and labs  reviewed.  BRIANNE BARNES is a 65 YO Female  presented to ED for a severe back pain. Patient states that trip and fall 3 weeks ago landing on back.  Patient was taking flexeril since then with minimal relief and now feels pain worsening. Pain is in lower and mid back and radiates around her stomach. Denies nausea, vomiting, diarrhea, fever or chills.  She has no numbness or tingling but she feels weak. No saddle anesthesia or difficult with urination/ BM. Patient taking ibuprofen last dose 1030 am.   Patient is vaccinated for COVID-19 Moderna last dose March.  Patient with PMH breast CA (now in remission), benign brain tumor s/p resection, skin CA, seizure

## 2021-10-24 LAB
COVID-19 SPIKE DOMAIN AB INTERP: POSITIVE
COVID-19 SPIKE DOMAIN ANTIBODY RESULT: 97.6 U/ML — HIGH
CULTURE RESULTS: SIGNIFICANT CHANGE UP
HCV AB S/CO SERPL IA: 0.11 S/CO — SIGNIFICANT CHANGE UP (ref 0–0.99)
HCV AB SERPL-IMP: SIGNIFICANT CHANGE UP
SARS-COV-2 IGG+IGM SERPL QL IA: 97.6 U/ML — HIGH
SARS-COV-2 IGG+IGM SERPL QL IA: POSITIVE
SPECIMEN SOURCE: SIGNIFICANT CHANGE UP

## 2021-10-24 RX ORDER — HEPARIN SODIUM 5000 [USP'U]/ML
5000 INJECTION INTRAVENOUS; SUBCUTANEOUS EVERY 12 HOURS
Refills: 0 | Status: DISCONTINUED | OUTPATIENT
Start: 2021-10-24 | End: 2021-10-24

## 2021-10-24 RX ORDER — OXYCODONE HYDROCHLORIDE 5 MG/1
10 TABLET ORAL EVERY 4 HOURS
Refills: 0 | Status: DISCONTINUED | OUTPATIENT
Start: 2021-10-24 | End: 2021-10-27

## 2021-10-24 RX ORDER — ALPRAZOLAM 0.25 MG
0.25 TABLET ORAL
Refills: 0 | Status: DISCONTINUED | OUTPATIENT
Start: 2021-10-24 | End: 2021-10-25

## 2021-10-24 RX ORDER — PANTOPRAZOLE SODIUM 20 MG/1
40 TABLET, DELAYED RELEASE ORAL
Refills: 0 | Status: DISCONTINUED | OUTPATIENT
Start: 2021-10-24 | End: 2021-10-27

## 2021-10-24 RX ORDER — CARBAMAZEPINE 200 MG
200 TABLET ORAL
Refills: 0 | Status: DISCONTINUED | OUTPATIENT
Start: 2021-10-24 | End: 2021-10-27

## 2021-10-24 RX ORDER — ACETAMINOPHEN 500 MG
650 TABLET ORAL EVERY 4 HOURS
Refills: 0 | Status: DISCONTINUED | OUTPATIENT
Start: 2021-10-24 | End: 2021-10-27

## 2021-10-24 RX ORDER — DIVALPROEX SODIUM 500 MG/1
250 TABLET, DELAYED RELEASE ORAL
Refills: 0 | Status: DISCONTINUED | OUTPATIENT
Start: 2021-10-24 | End: 2021-10-27

## 2021-10-24 RX ORDER — OXYCODONE HYDROCHLORIDE 5 MG/1
5 TABLET ORAL EVERY 4 HOURS
Refills: 0 | Status: DISCONTINUED | OUTPATIENT
Start: 2021-10-24 | End: 2021-10-27

## 2021-10-24 RX ORDER — ENOXAPARIN SODIUM 100 MG/ML
40 INJECTION SUBCUTANEOUS AT BEDTIME
Refills: 0 | Status: DISCONTINUED | OUTPATIENT
Start: 2021-10-24 | End: 2021-10-25

## 2021-10-24 RX ORDER — METHOCARBAMOL 500 MG/1
1500 TABLET, FILM COATED ORAL THREE TIMES A DAY
Refills: 0 | Status: DISCONTINUED | OUTPATIENT
Start: 2021-10-24 | End: 2021-10-27

## 2021-10-24 RX ORDER — DIVALPROEX SODIUM 500 MG/1
250 TABLET, DELAYED RELEASE ORAL ONCE
Refills: 0 | Status: COMPLETED | OUTPATIENT
Start: 2021-10-24 | End: 2021-10-24

## 2021-10-24 RX ORDER — CARBAMAZEPINE 200 MG
200 TABLET ORAL ONCE
Refills: 0 | Status: COMPLETED | OUTPATIENT
Start: 2021-10-24 | End: 2021-10-24

## 2021-10-24 RX ADMIN — DIVALPROEX SODIUM 250 MILLIGRAM(S): 500 TABLET, DELAYED RELEASE ORAL at 11:12

## 2021-10-24 RX ADMIN — Medication 200 MILLIGRAM(S): at 01:13

## 2021-10-24 RX ADMIN — PANTOPRAZOLE SODIUM 40 MILLIGRAM(S): 20 TABLET, DELAYED RELEASE ORAL at 06:13

## 2021-10-24 RX ADMIN — ENOXAPARIN SODIUM 40 MILLIGRAM(S): 100 INJECTION SUBCUTANEOUS at 21:08

## 2021-10-24 RX ADMIN — HEPARIN SODIUM 5000 UNIT(S): 5000 INJECTION INTRAVENOUS; SUBCUTANEOUS at 06:13

## 2021-10-24 RX ADMIN — OXYCODONE HYDROCHLORIDE 5 MILLIGRAM(S): 5 TABLET ORAL at 21:32

## 2021-10-24 RX ADMIN — OXYCODONE HYDROCHLORIDE 5 MILLIGRAM(S): 5 TABLET ORAL at 21:07

## 2021-10-24 RX ADMIN — Medication 200 MILLIGRAM(S): at 11:11

## 2021-10-24 RX ADMIN — Medication 200 MILLIGRAM(S): at 17:20

## 2021-10-24 RX ADMIN — OXYCODONE HYDROCHLORIDE 10 MILLIGRAM(S): 5 TABLET ORAL at 01:23

## 2021-10-24 RX ADMIN — DIVALPROEX SODIUM 250 MILLIGRAM(S): 500 TABLET, DELAYED RELEASE ORAL at 01:13

## 2021-10-24 RX ADMIN — DIVALPROEX SODIUM 250 MILLIGRAM(S): 500 TABLET, DELAYED RELEASE ORAL at 17:20

## 2021-10-24 NOTE — CHART NOTE - NSCHARTNOTEFT_GEN_A_CORE
Called by RN. Patient's home medications and diet not ordered. Patient seen and examined at bedside. Will give patient's home Carbamazepine and Divalproex as prescribed. Will keep patient NPO except meds pending MRI results. Will continue to monitor, RN to call if any changes.

## 2021-10-25 LAB
ANION GAP SERPL CALC-SCNC: 8 MMOL/L — SIGNIFICANT CHANGE UP (ref 5–17)
BUN SERPL-MCNC: 13 MG/DL — SIGNIFICANT CHANGE UP (ref 7–23)
CALCIUM SERPL-MCNC: 8.5 MG/DL — SIGNIFICANT CHANGE UP (ref 8.5–10.1)
CHLORIDE SERPL-SCNC: 106 MMOL/L — SIGNIFICANT CHANGE UP (ref 96–108)
CO2 SERPL-SCNC: 26 MMOL/L — SIGNIFICANT CHANGE UP (ref 22–31)
CREAT SERPL-MCNC: 0.49 MG/DL — LOW (ref 0.5–1.3)
GLUCOSE SERPL-MCNC: 71 MG/DL — SIGNIFICANT CHANGE UP (ref 70–99)
POTASSIUM SERPL-MCNC: 4 MMOL/L — SIGNIFICANT CHANGE UP (ref 3.5–5.3)
POTASSIUM SERPL-SCNC: 4 MMOL/L — SIGNIFICANT CHANGE UP (ref 3.5–5.3)
SODIUM SERPL-SCNC: 140 MMOL/L — SIGNIFICANT CHANGE UP (ref 135–145)

## 2021-10-25 PROCEDURE — 72148 MRI LUMBAR SPINE W/O DYE: CPT | Mod: 26

## 2021-10-25 PROCEDURE — 72146 MRI CHEST SPINE W/O DYE: CPT | Mod: 26

## 2021-10-25 RX ORDER — ALPRAZOLAM 0.25 MG
0.25 TABLET ORAL THREE TIMES A DAY
Refills: 0 | Status: DISCONTINUED | OUTPATIENT
Start: 2021-10-25 | End: 2021-10-27

## 2021-10-25 RX ORDER — MORPHINE SULFATE 50 MG/1
2 CAPSULE, EXTENDED RELEASE ORAL EVERY 6 HOURS
Refills: 0 | Status: DISCONTINUED | OUTPATIENT
Start: 2021-10-25 | End: 2021-10-27

## 2021-10-25 RX ORDER — LIDOCAINE 4 G/100G
1 CREAM TOPICAL EVERY 24 HOURS
Refills: 0 | Status: DISCONTINUED | OUTPATIENT
Start: 2021-10-25 | End: 2021-10-27

## 2021-10-25 RX ADMIN — OXYCODONE HYDROCHLORIDE 10 MILLIGRAM(S): 5 TABLET ORAL at 11:34

## 2021-10-25 RX ADMIN — DIVALPROEX SODIUM 250 MILLIGRAM(S): 500 TABLET, DELAYED RELEASE ORAL at 18:48

## 2021-10-25 RX ADMIN — Medication 200 MILLIGRAM(S): at 05:35

## 2021-10-25 RX ADMIN — ENOXAPARIN SODIUM 40 MILLIGRAM(S): 100 INJECTION SUBCUTANEOUS at 22:04

## 2021-10-25 RX ADMIN — LIDOCAINE 1 PATCH: 4 CREAM TOPICAL at 19:17

## 2021-10-25 RX ADMIN — LIDOCAINE 1 PATCH: 4 CREAM TOPICAL at 18:48

## 2021-10-25 RX ADMIN — Medication 200 MILLIGRAM(S): at 18:48

## 2021-10-25 RX ADMIN — OXYCODONE HYDROCHLORIDE 10 MILLIGRAM(S): 5 TABLET ORAL at 10:34

## 2021-10-25 RX ADMIN — Medication 1 MILLIGRAM(S): at 13:07

## 2021-10-25 RX ADMIN — DIVALPROEX SODIUM 250 MILLIGRAM(S): 500 TABLET, DELAYED RELEASE ORAL at 05:35

## 2021-10-25 RX ADMIN — PANTOPRAZOLE SODIUM 40 MILLIGRAM(S): 20 TABLET, DELAYED RELEASE ORAL at 05:35

## 2021-10-25 NOTE — CHART NOTE - NSCHARTNOTEFT_GEN_A_CORE
*** CHARTING IN PROGRESS - INCOMPLETE NOTE ***    Orthopaedic Surgery - Chart Note    Patient with MR Lumbar Spine findings consistent with ACUTE THREE COLUMN SPINE INJURY SUGGESTIVE OF CLINICALLY UNSTABLE SPINE.      MR Thoracic / Lumbar Spine (10/25)     Acute compression fracture of superior T12 vertebral body with disruption of the ossified anterior longitudinal ligament, widening of anterior T11-T12 disc space, and T11-T12 interspinous ligament edema/injury. Imaging demonstrates abnormal fluid signal within anterior and posterior T11-T12 disc, with questionable minimal edema within the posterior longitudinal ligament. Findings are consistent with unstable 3-column spinal injury in the setting of possible ankylosing spondylitis.    Patient also with bone contusions of T11-T12 spinous processes, with adjacent paraspinal soft tissue contusion and an additional small bone contusion at posterior inferior T11 vertebral body.    Small prevertebral edema/hemorrhage at T11-T12. No significant epidural hematoma or spinal canal stenosis.        PLAN:  - Patient designated with clinically UNSTABLE SPINE based on radiographic findings.   - PATIENT REQUIRES STRICT BEDREST AT ALL TIMES.  - Patient has been informed of the imaging findings and their clinical relevance with all questions answered and concerns addressed to the patient's satisfaction.   - Patient will require surgical fixation for stabilization of the spinal column.   - Will discuss with Dr. Beach and advise of any changes to the plan above. Orthopaedic Spine Surgery - Chart Note    Patient with MR Lumbar Spine findings consistent with ACUTE THREE COLUMN SPINE INJURY SUGGESTIVE OF CLINICALLY UNSTABLE SPINE.      MR Thoracic / Lumbar Spine (10/25)     Acute compression fracture of superior T12 vertebral body with disruption of the ossified anterior longitudinal ligament, widening of anterior T11-T12 disc space, and T11-T12 interspinous ligament edema/injury. Imaging demonstrates abnormal fluid signal within anterior and posterior T11-T12 disc, with questionable minimal edema within the posterior longitudinal ligament. Findings are consistent with unstable 3-column spinal injury in the setting of possible ankylosing spondylitis.    Patient also with bone contusions of T11-T12 spinous processes, with adjacent paraspinal soft tissue contusion and an additional small bone contusion at posterior inferior T11 vertebral body.    Small prevertebral edema/hemorrhage at T11-T12. No significant epidural hematoma or spinal canal stenosis.        PLAN:  - Patient designated with clinically UNSTABLE SPINE based on MR findings.   - PATIENT REQUIRES STRICT BEDREST AT ALL TIMES.  - Patient has been informed of the imaging findings and their clinical relevance with all questions answered and concerns addressed to the patient's satisfaction.   - Patient will require surgical fixation for stabilization of the spinal column.   - Plan for OR Wednesday for operative stabilization (10/27)  - Please document medical optimization  - Will discuss with Dr. Beach and advise of any changes to the plan above.

## 2021-10-26 ENCOUNTER — TRANSCRIPTION ENCOUNTER (OUTPATIENT)
Age: 66
End: 2021-10-26

## 2021-10-26 LAB
ANION GAP SERPL CALC-SCNC: 7 MMOL/L — SIGNIFICANT CHANGE UP (ref 5–17)
APTT BLD: 33.1 SEC — SIGNIFICANT CHANGE UP (ref 27.5–35.5)
BUN SERPL-MCNC: 12 MG/DL — SIGNIFICANT CHANGE UP (ref 7–23)
CALCIUM SERPL-MCNC: 8.8 MG/DL — SIGNIFICANT CHANGE UP (ref 8.5–10.1)
CHLORIDE SERPL-SCNC: 106 MMOL/L — SIGNIFICANT CHANGE UP (ref 96–108)
CO2 SERPL-SCNC: 27 MMOL/L — SIGNIFICANT CHANGE UP (ref 22–31)
CREAT SERPL-MCNC: 0.46 MG/DL — LOW (ref 0.5–1.3)
GLUCOSE SERPL-MCNC: 83 MG/DL — SIGNIFICANT CHANGE UP (ref 70–99)
HCT VFR BLD CALC: 36.2 % — SIGNIFICANT CHANGE UP (ref 34.5–45)
HGB BLD-MCNC: 11.3 G/DL — LOW (ref 11.5–15.5)
INR BLD: 1.07 RATIO — SIGNIFICANT CHANGE UP (ref 0.88–1.16)
MCHC RBC-ENTMCNC: 28.4 PG — SIGNIFICANT CHANGE UP (ref 27–34)
MCHC RBC-ENTMCNC: 31.2 GM/DL — LOW (ref 32–36)
MCV RBC AUTO: 91 FL — SIGNIFICANT CHANGE UP (ref 80–100)
NRBC # BLD: 0 /100 WBCS — SIGNIFICANT CHANGE UP (ref 0–0)
PLATELET # BLD AUTO: 421 K/UL — HIGH (ref 150–400)
POTASSIUM SERPL-MCNC: 4.3 MMOL/L — SIGNIFICANT CHANGE UP (ref 3.5–5.3)
POTASSIUM SERPL-SCNC: 4.3 MMOL/L — SIGNIFICANT CHANGE UP (ref 3.5–5.3)
PROTHROM AB SERPL-ACNC: 12.5 SEC — SIGNIFICANT CHANGE UP (ref 10.6–13.6)
RBC # BLD: 3.98 M/UL — SIGNIFICANT CHANGE UP (ref 3.8–5.2)
RBC # FLD: 12.2 % — SIGNIFICANT CHANGE UP (ref 10.3–14.5)
SARS-COV-2 RNA SPEC QL NAA+PROBE: SIGNIFICANT CHANGE UP
SODIUM SERPL-SCNC: 140 MMOL/L — SIGNIFICANT CHANGE UP (ref 135–145)
WBC # BLD: 7.78 K/UL — SIGNIFICANT CHANGE UP (ref 3.8–10.5)
WBC # FLD AUTO: 7.78 K/UL — SIGNIFICANT CHANGE UP (ref 3.8–10.5)

## 2021-10-26 PROCEDURE — 99497 ADVNCD CARE PLAN 30 MIN: CPT

## 2021-10-26 PROCEDURE — 93010 ELECTROCARDIOGRAM REPORT: CPT

## 2021-10-26 PROCEDURE — 99222 1ST HOSP IP/OBS MODERATE 55: CPT

## 2021-10-26 RX ORDER — OXYCODONE HYDROCHLORIDE 5 MG/1
1 TABLET ORAL
Qty: 0 | Refills: 0 | DISCHARGE
Start: 2021-10-26

## 2021-10-26 RX ORDER — ACETAMINOPHEN 500 MG
2 TABLET ORAL
Qty: 0 | Refills: 0 | DISCHARGE
Start: 2021-10-26

## 2021-10-26 RX ORDER — LIDOCAINE 4 G/100G
0 CREAM TOPICAL
Qty: 0 | Refills: 0 | DISCHARGE
Start: 2021-10-26

## 2021-10-26 RX ORDER — METHOCARBAMOL 500 MG/1
2 TABLET, FILM COATED ORAL
Qty: 0 | Refills: 0 | DISCHARGE
Start: 2021-10-26

## 2021-10-26 RX ADMIN — PANTOPRAZOLE SODIUM 40 MILLIGRAM(S): 20 TABLET, DELAYED RELEASE ORAL at 05:47

## 2021-10-26 RX ADMIN — Medication 200 MILLIGRAM(S): at 17:15

## 2021-10-26 RX ADMIN — DIVALPROEX SODIUM 250 MILLIGRAM(S): 500 TABLET, DELAYED RELEASE ORAL at 17:14

## 2021-10-26 RX ADMIN — LIDOCAINE 1 PATCH: 4 CREAM TOPICAL at 19:12

## 2021-10-26 RX ADMIN — LIDOCAINE 1 PATCH: 4 CREAM TOPICAL at 17:16

## 2021-10-26 RX ADMIN — OXYCODONE HYDROCHLORIDE 10 MILLIGRAM(S): 5 TABLET ORAL at 13:11

## 2021-10-26 RX ADMIN — Medication 200 MILLIGRAM(S): at 05:47

## 2021-10-26 RX ADMIN — LIDOCAINE 1 PATCH: 4 CREAM TOPICAL at 06:10

## 2021-10-26 RX ADMIN — DIVALPROEX SODIUM 250 MILLIGRAM(S): 500 TABLET, DELAYED RELEASE ORAL at 05:47

## 2021-10-26 NOTE — DISCHARGE NOTE NURSING/CASE MANAGEMENT/SOCIAL WORK - PATIENT PORTAL LINK FT
You can access the FollowMyHealth Patient Portal offered by [facility] by registering at the following website: [email or web address]. By joining [employer]’s FollowMyHealth portal, you will also be able to view your health information using other applications (apps) compatible with our system.

## 2021-10-26 NOTE — CONSULT NOTE ADULT - ATTENDING COMMENTS
Pt with mech fall and now requiring urgent spinal surgery. No signs of significant ischemia or volume overload. EKG without any signs of ischemia. Currently no active cardiac conditions. No signs of ischemia, ADHF, clinical exam not consistent with severe stenotic valvular disease, no unstable arrhythmias noted. Therefore able to proceed with this urgent intermediate risk spinal surgery without any further cardiac workup. Routine hemodynamic monitoring is suggested during the procedure.

## 2021-10-26 NOTE — GOALS OF CARE CONVERSATION - ADVANCED CARE PLANNING - CONVERSATION DETAILS
Writer met with pt  Reviewed patient's medical and social history as well as events leading to patient's hospitalization. Writer discussed patient's current diagnosis S/P fall back injury, debility  medical condition and management prognosis, and life expectancy. Pt states she has a HCP that is her daughter Recommended  pt discuss with HCP her wishes regarding extent of medical care to be provided including escalation of medical care into the ICU and use of vasopressor support. In addition, her  thoughts regarding cardiopulmonary resuscitation, artificial nutrition and hydration including use of feeding tubes and IVF, antibiotics, and further investigative studies such as blood draws and radiology. pt showed good  insight into medical condition. All questions answered

## 2021-10-26 NOTE — DISCHARGE NOTE PROVIDER - NSDCCPCAREPLAN_GEN_ALL_CORE_FT
PRINCIPAL DISCHARGE DIAGNOSIS  Diagnosis: Fracture of transverse process of lumbar vertebra  Assessment and Plan of Treatment: follow up with spine surgery at tertiary care      SECONDARY DISCHARGE DIAGNOSES  Diagnosis: Back pain  Assessment and Plan of Treatment:

## 2021-10-26 NOTE — CONSULT NOTE ADULT - ASSESSMENT
65 YO Female  presented to ED for a severe back pain. Patient states that trip and fall 3 weeks ago landing on back.  Cardiology called for pre/post cardiac optimization     Preop  - H/o CAD. No active cardiac condition.   - Denies chest pain, palpitation, SOB, syncope, dizziness, lightheadedness, orthopnea, PND, BACA and edema  - EKG demonstrates NSR. No acute ischemic changes noted.   - Chest X-ray negative for acute process  - Telemetry shows no arrhythmias  - Patient euvolemic on examination with no overt signs of heart failure or cardiac ischemia.   - No severe valvular abnormalities noted on examination  - Patient with past ECHO demonstrating....   - ECHO pending to r/o valvular abnormalities and to assess for pulmonary hypertension and heart function.   - No additional cardiac work-up is necessary.   - There is no cardiac contraindication to proceeding with the planned surgery.   - Routine hemodynamic monitoring recommended.     - Pt has no active ischemia, decompensated heart failure, unstable arrythmia, or severe stenotic valvular disease. Patient is optimized from cardiovascular standpoint to proceed with planned procedure with routine hemodynamic monitoring.     - History of CAD and advanced age do put the patient at a higher surgical risk, though this is non-modifiable at current time  - Patient at high risk for procedure given volume overload and renal dysfunction, but benefit of procedure outweighs the risk. OK to proceed with the planned procedure.                   65 YO Female  presented to ED for a severe back pain. Patient states that trip and fall 3 weeks ago landing on back.  Cardiology called for pre/post cardiac optimization     Preop for surgical fixation  - admitted for severe back pain, s/p trip and fall 3 weeks ago  - MRI: Acute compression fracture of superior T12 vertebral body (25% height loss), with disruption of the ossified anterior longitudinal ligament, slight widening of anterior T11-T12 disc space and T11-T12 interspinous ligament edema/injury.   - Planned for surgical fixation for stabilization of the spinal column.   - No active cardiac condition.   - Denies chest pain, palpitation, SOB, syncope, dizziness, lightheadedness, orthopnea, PND, BACA and edema  - EKG demonstrates NSR 1st degree AVB HR 86 bpm, No acute ischemic changes noted.   - Vitals stable, BP: 121/74 (10-26-21 @ 11:47) (101/66 - 121/74), HR: 89 (10-26 @ 11:47) (85 - 93)  - Patient euvolemic on examination with no overt signs of heart failure or cardiac ischemia.   - No severe valvular abnormalities noted on examination  - Monitor and replete Lytes. Keep K > 4 and Mg > 2  - Patient is requesting for a family meeting with daughter, surgical team, neurologist, oncologist and medical team before surgical intervention  - Pt has no active ischemia, decompensated heart failure, unstable arrythmia, or severe stenotic valvular disease. Patient is optimized from cardiovascular standpoint to proceed with planned procedure with routine hemodynamic monitoring.     - All other medical needs as per primary team.  - Other cardiovascular workup will depend on clinical course.  - Will continue to follow.    Esther Dupont [facility]  Nurse Practitioner - Cardiology   Spectra #[id number]/ [phone]

## 2021-10-26 NOTE — DISCHARGE NOTE PROVIDER - NSDCMRMEDTOKEN_GEN_ALL_CORE_FT
acetaminophen 325 mg oral tablet: 2 tab(s) orally every 4 hours, As needed, Mild Pain (1 - 3)  carBAMazepine 200 mg oral tablet: orally 2 times a day  Colace 100 mg oral capsule: 1 cap(s) orally 2 times a day   divalproex sodium 250 mg oral delayed release tablet: orally 2 times a day  lidocaine 4% topical film: Apply topically to affected area once a day  methocarbamol 750 mg oral tablet: 2 tab(s) orally 3 times a day, As needed, Muscle Spasm  omeprazole 40 mg oral delayed release capsule: 1 cap(s) orally once a day  oxyCODONE 10 mg oral tablet: 1 tab(s) orally every 4 hours, As needed, Severe Pain (7 - 10)  oxyCODONE 5 mg oral tablet: 1 tab(s) orally every 4 hours, As needed, Moderate Pain (4 - 6)  Xanax 0.25 mg oral tablet: 1 tab(s) orally 2 times a day, As Needed -for anxiety MDD:2 tabs

## 2021-10-26 NOTE — DISCHARGE NOTE PROVIDER - HOSPITAL COURSE
admitted for back pain with fall  found to have spinal fx on mri  family opted for care at tertiary care hospital  transfer to tertiary care level per spine sx / ortho team

## 2021-10-26 NOTE — CONSULT NOTE ADULT - SUBJECTIVE AND OBJECTIVE BOX
DOCUMENTATION IN PROGRESS              [facility] - Eda Carnes, Joyce Astorga, Apryl, Yolanda Jasso Savella  Office Number: [phone]    Initial Consult Note  CHIEF COMPLAINT: Patient is a 66y old  Female who presents with a chief complaint of Intractable mid and lower back pain. (26 Oct 2021 07:16)    HPI:  Chart and labs  reviewed.  BRIANNE BARNES is a 67 YO Female  presented to ED for a severe back pain. Patient states that trip and fall 3 weeks ago landing on back.  Patient was taking flexeril since then with minimal relief and now feels pain worsening. Pain is in lower and mid back and radiates around her stomach. Denies nausea, vomiting, diarrhea, fever or chills.  She has no numbness or tingling but she feels weak. No saddle anesthesia or difficult with urination/ BM. Patient taking ibuprofen last dose 1030 am.   Patient is vaccinated for COVID-19 Moderna last dose March.  Patient with PMH breast CA (now in remission), benign brain tumor s/p resection, skin CA, seizure (23 Oct 2021 17:35)    Allergies    Cipro (Unknown)  vancomycin (Other)    Intolerances      PAST MEDICAL & SURGICAL HISTORY:  Seizures  Brain cancer  Breast cancer  Melanoma  History of hip replacement      MEDICATIONS  (STANDING):  carBAMazepine 200 milliGRAM(s) Oral two times a day  diVALproex  milliGRAM(s) Oral <User Schedule>  lidocaine   4% Patch 1 Patch Transdermal every 24 hours  pantoprazole    Tablet 40 milliGRAM(s) Oral before breakfast    MEDICATIONS  (PRN):  acetaminophen     Tablet .. 650 milliGRAM(s) Oral every 4 hours PRN Mild Pain (1 - 3)  ALPRAZolam 0.25 milliGRAM(s) Oral three times a day PRN anxiety  methocarbamol 1500 milliGRAM(s) Oral three times a day PRN Muscle Spasm  morphine  - Injectable 2 milliGRAM(s) IV Push every 6 hours PRN breakthrough pain  oxyCODONE    IR 5 milliGRAM(s) Oral every 4 hours PRN Moderate Pain (4 - 6)  oxyCODONE    IR 10 milliGRAM(s) Oral every 4 hours PRN Severe Pain (7 - 10)    FAMILY HISTORY:  No pertinent family history in first degree relatives      SOCIAL HISTORY    Marital Status:   Occupation:   Lives with:     SUBSTANCE USE  Tobacco Usage:  ( ) None ( ) never smoked   ( ) former smoker  ( ) current smoker; Packs per day:   Alcohol Usage: ( ) none  ( ) occasional ( ) 2-3 times a week ( ) daily; Last drink:   Recreational drugs ( ) None    REVIEW OF SYSTEMS   CONSTITUTIONAL: No fevers, No chills, No fatigue, No weight gain  EYES: No vision changes, No vertigo, No throat pain   ENT: No congestion, No ear pain, No sore throat.  NECK: No pain, No stiffness  RESPIRATORY: No shortness of breath, No cough, No wheezing, No hemoptysis  CARDIOVASCULAR: No chest pain. No palpitations, No BACA, No orthopnea, No PND, No pleuritic pain  GASTROINTESTINAL: No abdominal pain, No nausea, No vomiting, No hematemesis, No diarrhea No constipation. No melena  GENITOURINARY: No dysuria, No frequency, No incontinence, No hematuria  NEUROLOGICAL: No dizziness, No lightheadedness, No syncope, No LOC, No headache, No numbness, No weakness  MUSCULOSKELETAL: No joint pain, No joint swelling.  PSYCHIATRIC: No anxiety, No depression  DERMATOLOGY: No diaphoresis. No itching, No rashes, No pressure ulcers  HEME/LYMPH: No easy bruising, or bleeding gums  All other review of systems is negative unless indicated above.    VITAL SIGNS:   Vital Signs Last 24 Hrs  T(C): 36.9 (26 Oct 2021 11:47), Max: 36.9 (25 Oct 2021 21:25)  T(F): 98.4 (26 Oct 2021 11:47), Max: 98.4 (25 Oct 2021 21:25)  HR: 89 (26 Oct 2021 11:47) (85 - 93)  BP: 121/74 (26 Oct 2021 11:47) (101/66 - 121/74)  RR: 17 (26 Oct 2021 11:47) (16 - 17)  SpO2: 96% (26 Oct 2021 11:47) (96% - 97%)    Physical Exam:    Appearance: NAD, no distress, alert,   HEENT: Moist Mucous Membranes, Anicteric  Cardiovascular: Regular rate and rhythm, Normal S1 S2, No JVD, No murmurs, No rubs, gallops or clicks  Respiratory: Lungs clear to auscultation. No rales, No rhonchi, No wheezing. No tenderness to palpation  Gastrointestinal:  Soft, Non-tender, + BS  Neurologic: Non-focal  Skin: Warm and dry, No rashes, No ecchymosis, No cyanosis, No ulcers   Musculoskeletal: No clubbing, No cyanosis  Vascular: Peripheral pulses palpable 2+ bilaterally  Psychiatry: Mood & affect appropriate  Lymph: No peripheral edema.     I&O's Summary    25 Oct 2021 07:01  -  26 Oct 2021 07:00  --------------------------------------------------------  IN: 120 mL / OUT: 250 mL / NET: -130 mL        LABS: All Labs Reviewed:                        11.3   7.78  )-----------( 421      ( 26 Oct 2021 08:12 )             36.2     26 Oct 2021 08:12    140    |  106    |  12     ----------------------------<  83     4.3     |  27     |  0.46   25 Oct 2021 08:48    140    |  106    |  13     ----------------------------<  71     4.0     |  26     |  0.49     Ca    8.8        26 Oct 2021 08:12  Ca    8.5        25 Oct 2021 08:48    PT/INR - ( 26 Oct 2021 08:12 )   PT: 12.5 sec;   INR: 1.07 ratio       PTT - ( 26 Oct 2021 08:12 )  PTT:33.1 sec  < from: 12 Lead ECG (02.11.19 @ 13:15) >    Ventricular Rate 92 BPM  Atrial Rate 92 BPM  P-R Interval 170 ms  QRS Duration 84 ms  Q-T Interval 374 ms  QTC Calculation(Bezet) 462 ms  P Axis 67 degrees  R Axis -76 degrees  T Axis 17 degrees  Diagnosis Line Normal sinus rhythm  Left axis deviation  Abnormal ECG  No previous ECGs available  Confirmed by GRIS FINLEY ([id number]) on 2/11/2019 4:56:28 PM    < end of copied text >    < from: MR Lumbar Spine No Cont (10.25.21 @ 14:31) >    EXAM:  MR SPINE LUMBAR                          EXAM:  MR SPINE THORACIC                            PROCEDURE DATE:  10/25/2021          INTERPRETATION:  MR THORACIC SPINE, MR LUMBAR SPINE    History: Pain. r/o Trauma. Rule out ligamentous injury, status post MF, possible undiagnosed AS. Additional history per EMR: "66y f w/ Nondisplaced subacute left L1/L2 TP fx, age indeterminant VCF T12, hypertrophic ligamentum flavum, minimal left lat subluxation T11-12, break in ALL ossification    Comparison: CT thoracic lumbar spine 10/23/2021    Technique: Multiplanar, multisequence magnetic resonance imaging of the thoracic and lumbar spine regions was performed without contrast.    FINDINGS:    FINDINGS:    The lower cervical spine is partially and suboptimally imaged    THORACIC SPINE:    Alignment, vertebral bodies, intervertebral disc space and marrow:  An exaggerated kyphosis is redemonstrated. Thin ossification of the anterior longitudinal ligament better appreciated on prior CT.  The anterior longitudinal ligament is disrupted at T11-T12, corresponding to the break in ligament ossification seen on recent CT.  Additionally, there is associated widening of the anterior T11-T12 disc space, which demonstrate abnormal intense fluid signalat its anterior two thirds and posterior one third portion (image 8, series 5). There is minimal retrolisthesis of T11 on T12, with lateral subluxation seen on CT. However, the posterior longitudinal ligament appears grossly intact, with questionableminimal fluid signal (image 8, series 5 for example).  Mild compression deformity of the anterior superior T12 endplate (25% height loss), with corresponding marrow edema, suggesting acuity. Small Schmorl's node at inferior T12 endplate.   Small additional marrow edema within the adjacent posterior inferior T11 vertebral body, may reflect bone contusion given no definite corresponding fracture line on CT.  Edema within the T11-T12 intraspinous space and adjacent spinous processes and paraspinal soft tissues, suggesting a spinous ligamentous sprain/injury, with associated T11-T12 spinous process contusions and paraspinal contusion (image 6, series 5).    Slight diffuse T1 hypointense marrow signal, nonspecific.    Spinal Canal: No significant spinal canal stenosis is identified within the thoracic spine and at the level of the described injury at T11-T12.    Neural foramina: No neuroforaminal narrowing identified. Multilevel facet arthrosis and ligamentum flavum ossification noted.    Prevertebral and Intradural/extradural space: Mild prevertebral edema/hemorrhage is noted at the level of T11-T12 injury (images 5-11, series 5). No significant epidural fluid collection/hematoma is identified. Evaluation.    Lung/chest wall: Respiratory motion limits evaluation of the lungs. Dependent posterior atelectasis is noted.    LUMBAR SPINE:  Examination is slightly degraded due to patient motion.    Alignment:  There is a slightly exaggerated lumbar lordosis.  Minimal grade 1 retrolisthesis of L2 on L3, likely degenerative in nature, given lack of pars interarticularis defects.    Vertebrae, intervertebral disc and marrow:  Vertebral body heights are grossly preserved, with minimal degenerative irregularity and small Schmorl's node at inferior L1 and L2 endplates.  Previously described nondisplaced cortical buckle fracture of the left L1 and L2 transverse process, better appreciated on prior CT. No corresponding marrow edema, suggesting chronicity.    Slight diffuse heterogeneous T1 hypointense marrow signal, nonspecific.      Spinal Canal: No significant spinal canal stenosis is identified.    Neural foramina: No high-grade neuroforaminal narrowing is identified. Varying degrees of mild multilevel neuroforaminal narrowing, due to marked facet hypertrophy/arthrosis. Ligamentum flavum ossification and marked facet hypertrophy encroaches within the spinal canal is noted at multiple levels    Prevertebral and Intradural/extradural space: No prevertebral soft tissue swelling/edema or significant epidural fluid collection appreciated, within lumbar spine.    Paraspinal soft tissues: Mild STIR hyperintense signal within the bilateral paraspinal soft tissues, may reflect artifact versus mild nonspecific edema. Fatty atrophy of the lumbosacral paraspinal musculature.  Abdominal/Retroperitoneal soft tissues: Large 2.1 cm T2 hyperintense left renal lesion, suboptimally evaluated but likely renal cyst. Similar additional smaller renal lesions noted. Partially imaged gallbladder appears distended, with gallstones. The common bile duct appears slightly prominent (measures 9 mm).    The partially visualized sacrum and sacroiliac joints appear grossly intact.      IMPRESSION:    1.  Acute compression fracture of superior T12 vertebral body (25% height loss), with disruption of the ossified anterior longitudinal ligament, slight widening of anterior T11-T12 disc space and T11-T12 interspinous ligament edema/injury. Abnormal fluid signal within anterior and posterior T11-T12 disc, with questionable minimal edema within the posterior longitudinal ligament. Findings are consistent with unstable 3 column spinal injury, in the setting of possible ankylosing spondylitis.    2.  Bone contusions of T11-T12 spinous processes, withadjacent paraspinal soft tissue contusion. Additional small bone contusion at posterior inferior T11 vertebral body.    3.  Small prevertebral edema/hemorrhage at T11-T12. No significant epidural hematoma or spinal canal stenosis.    4.  Slight diffuse T1 hypointense marrow signal, nonspecific but may suggest anemia amongst multiple other etiologies such as lymphoproliferative disorder, smoking or obesity. Correlate clinically.        Findings were discussed with Dr. Jayesh Calderón (orthopedic surgery service) via telephone on 10/25/2021 at 7:27 PM with verbal read back.    --- End of Report ---            RUTHIE MORE MD; Attending Radiologist  This document has been electronically signed. Oct 25 2021  8:01PM    < end of copied text >  < from: MR Thoracic Spine No Cont (10.25.21 @ 14:28) >    EXAM:  MR SPINE LUMBAR                          EXAM:  MR SPINE THORACIC                            PROCEDURE DATE:  10/25/2021          INTERPRETATION:  MR THORACIC SPINE, MR LUMBAR SPINE    History: Pain. r/o Trauma. Rule out ligamentous injury, status post MF, possible undiagnosed AS. Additional history per EMR: "66y f w/ Nondisplaced subacute left L1/L2 TP fx, age indeterminant VCF T12, hypertrophic ligamentum flavum, minimal left lat subluxation T11-12, break in ALL ossification    Comparison: CT thoracic lumbar spine 10/23/2021    Technique: Multiplanar, multisequence magnetic resonance imaging of the thoracic and lumbar spine regions was performed without contrast.    FINDINGS:    FINDINGS:    The lower cervical spine is partially and suboptimally imaged    THORACIC SPINE:    Alignment, vertebral bodies, intervertebral disc space and marrow:  An exaggerated kyphosis is redemonstrated. Thin ossification of the anterior longitudinal ligament better appreciated on prior CT.  The anterior longitudinal ligament is disrupted at T11-T12, corresponding to the break in ligament ossification seen on recent CT.  Additionally, there is associated widening of the anterior T11-T12 disc space, which demonstrate abnormal intense fluid signalat its anterior two thirds and posterior one third portion (image 8, series 5). There is minimal retrolisthesis of T11 on T12, with lateral subluxation seen on CT. However, the posterior longitudinal ligament appears grossly intact, with questionableminimal fluid signal (image 8, series 5 for example).  Mild compression deformity of the anterior superior T12 endplate (25% height loss), with corresponding marrow edema, suggesting acuity. Small Schmorl's node at inferior T12 endplate.   Small additional marrow edema within the adjacent posterior inferior T11 vertebral body, may reflect bone contusion given no definite corresponding fracture line on CT.  Edema within the T11-T12 intraspinous space and adjacent spinous processes and paraspinal soft tissues, suggesting a spinous ligamentous sprain/injury, with associated T11-T12 spinous process contusions and paraspinal contusion (image 6, series 5).    Slight diffuse T1 hypointense marrow signal, nonspecific.    Spinal Canal: No significant spinal canal stenosis is identified within the thoracic spine and at the level of the described injury at T11-T12.    Neural foramina: No neuroforaminal narrowing identified. Multilevel facet arthrosis and ligamentum flavum ossification noted.    Prevertebral and Intradural/extradural space: Mild prevertebral edema/hemorrhage is noted at the level of T11-T12 injury (images 5-11, series 5). No significant epidural fluid collection/hematoma is identified. Evaluation.    Lung/chest wall: Respiratory motion limits evaluation of the lungs. Dependent posterior atelectasis is noted.    LUMBAR SPINE:  Examination is slightly degraded due to patient motion.    Alignment:  There is a slightly exaggerated lumbar lordosis.  Minimal grade 1 retrolisthesis of L2 on L3, likely degenerative in nature, given lack of pars interarticularis defects.    Vertebrae, intervertebral disc and marrow:  Vertebral body heights are grossly preserved, with minimal degenerative irregularity and small Schmorl's node at inferior L1 and L2 endplates.  Previously described nondisplaced cortical buckle fracture of the left L1 and L2 transverse process, better appreciated on prior CT. No corresponding marrow edema, suggesting chronicity.    Slight diffuse heterogeneous T1 hypointense marrow signal, nonspecific.      Spinal Canal: No significant spinal canal stenosis is identified.    Neural foramina: No high-grade neuroforaminal narrowing is identified. Varying degrees of mild multilevel neuroforaminal narrowing, due to marked facet hypertrophy/arthrosis. Ligamentum flavum ossification and marked facet hypertrophy encroaches within the spinal canal is noted at multiple levels    Prevertebral and Intradural/extradural space: No prevertebral soft tissue swelling/edema or significant epidural fluid collection appreciated, within lumbar spine.    Paraspinal soft tissues: Mild STIR hyperintense signal within the bilateral paraspinal soft tissues, may reflect artifact versus mild nonspecific edema. Fatty atrophy of the lumbosacral paraspinal musculature.  Abdominal/Retroperitoneal soft tissues: Large 2.1 cm T2 hyperintense left renal lesion, suboptimally evaluated but likely renal cyst. Similar additional smaller renal lesions noted. Partially imaged gallbladder appears distended, with gallstones. The common bile duct appears slightly prominent (measures 9 mm).    The partially visualized sacrum and sacroiliac joints appear grossly intact.      IMPRESSION:    1.  Acute compression fracture of superior T12 vertebral body (25% height loss), with disruption of the ossified anterior longitudinal ligament, slight widening of anterior T11-T12 disc space and T11-T12 interspinous ligament edema/injury. Abnormal fluid signal within anterior and posterior T11-T12 disc, with questionable minimal edema within the posterior longitudinal ligament. Findings are consistent with unstable 3 column spinal injury, in the setting of possible ankylosing spondylitis.    2.  Bone contusions of T11-T12 spinous processes, withadjacent paraspinal soft tissue contusion. Additional small bone contusion at posterior inferior T11 vertebral body.    3.  Small prevertebral edema/hemorrhage at T11-T12. No significant epidural hematoma or spinal canal stenosis.    4.  Slight diffuse T1 hypointense marrow signal, nonspecific but may suggest anemia amongst multiple other etiologies such as lymphoproliferative disorder, smoking or obesity. Correlate clinically.        Findings were discussed with Dr. Jayesh Calderón (orthopedic surgery service) via telephone on 10/25/2021 at 7:27 PM with verbal read back.    --- End of Report ---            RUTHIE MORE MD; Attending Radiologist  This document has been electronically signed. Oct 25 2021  8:01PM    < end of copied text >               [facility] - Eda Carnes Grossman, [place], Apryl, Romero, Sharmin Guerrero  Office Number: [phone]    Initial Consult Note  CHIEF COMPLAINT: Patient is a 66y old  Female who presents with a chief complaint of Intractable mid and lower back pain. (26 Oct 2021 07:16)    HPI:  Chart and labs  reviewed.  BRIANNE BARNES is a 65 YO Female  presented to ED for a severe back pain. Patient states that trip and fall 3 weeks ago landing on back.  Patient was taking flexeril since then with minimal relief and now feels pain worsening. Pain is in lower and mid back and radiates around her stomach. Denies nausea, vomiting, diarrhea, fever or chills.  She has no numbness or tingling but she feels weak. No saddle anesthesia or difficult with urination/ BM. Patient taking ibuprofen last dose 1030 am.   Patient is vaccinated for COVID-19 Moderna last dose March.  Patient with PMH breast CA (now in remission), benign brain tumor s/p resection, skin CA, seizure (23 Oct 2021 17:35)    Allergies    Cipro (Unknown)  vancomycin (Other)    Intolerances      PAST MEDICAL & SURGICAL HISTORY:  Seizures  Brain cancer  Breast cancer  Melanoma  History of hip replacement      MEDICATIONS  (STANDING):  carBAMazepine 200 milliGRAM(s) Oral two times a day  diVALproex  milliGRAM(s) Oral <User Schedule>  lidocaine   4% Patch 1 Patch Transdermal every 24 hours  pantoprazole    Tablet 40 milliGRAM(s) Oral before breakfast    MEDICATIONS  (PRN):  acetaminophen     Tablet .. 650 milliGRAM(s) Oral every 4 hours PRN Mild Pain (1 - 3)  ALPRAZolam 0.25 milliGRAM(s) Oral three times a day PRN anxiety  methocarbamol 1500 milliGRAM(s) Oral three times a day PRN Muscle Spasm  morphine  - Injectable 2 milliGRAM(s) IV Push every 6 hours PRN breakthrough pain  oxyCODONE    IR 5 milliGRAM(s) Oral every 4 hours PRN Moderate Pain (4 - 6)  oxyCODONE    IR 10 milliGRAM(s) Oral every 4 hours PRN Severe Pain (7 - 10)    FAMILY HISTORY:  No pertinent family history in first degree relatives      SOCIAL HISTORY    Marital Status:   Occupation:   Lives with:     SUBSTANCE USE  Tobacco Usage:  (x ) None ( ) never smoked   ( ) former smoker  ( ) current smoker; Packs per day:   Alcohol Usage: (x ) none  ( ) occasional ( ) 2-3 times a week ( ) daily; Last drink:   Recreational drugs (x ) None    REVIEW OF SYSTEMS   CONSTITUTIONAL: No fevers, No chills, No fatigue, No weight gain  EYES: No vision changes, No vertigo, No throat pain   ENT: No congestion, No ear pain, No sore throat.  NECK: No pain, No stiffness  RESPIRATORY: No shortness of breath, No cough, No wheezing, No hemoptysis  CARDIOVASCULAR: No chest pain. No palpitations, No BACA, No orthopnea, No PND, No pleuritic pain  GASTROINTESTINAL: No abdominal pain, No nausea, No vomiting, No hematemesis, No diarrhea No constipation. No melena  GENITOURINARY: No dysuria, No frequency, No incontinence, No hematuria  NEUROLOGICAL: No dizziness, No lightheadedness, No syncope, No LOC, No headache, No numbness, No weakness  MUSCULOSKELETAL: c/o back pain   PSYCHIATRIC: No anxiety, No depression  DERMATOLOGY: No diaphoresis. No itching, No rashes, No pressure ulcers  HEME/LYMPH: No easy bruising, or bleeding gums  All other review of systems is negative unless indicated above.    VITAL SIGNS:   Vital Signs Last 24 Hrs  T(C): 36.9 (26 Oct 2021 11:47), Max: 36.9 (25 Oct 2021 21:25)  T(F): 98.4 (26 Oct 2021 11:47), Max: 98.4 (25 Oct 2021 21:25)  HR: 89 (26 Oct 2021 11:47) (85 - 93)  BP: 121/74 (26 Oct 2021 11:47) (101/66 - 121/74)  RR: 17 (26 Oct 2021 11:47) (16 - 17)  SpO2: 96% (26 Oct 2021 11:47) (96% - 97%)    Physical Exam:    Appearance: NAD, no distress, alert,   HEENT: Moist Mucous Membranes, Anicteric  Cardiovascular: Regular rate and rhythm, Normal S1 S2, No JVD, No murmurs, No rubs, gallops or clicks  Respiratory: Lungs clear to auscultation. No rales, No rhonchi, No wheezing. No tenderness to palpation  Gastrointestinal:  Soft, Non-tender, + BS  Neurologic: Non-focal  Skin: Warm and dry, No rashes, No ecchymosis, No cyanosis, No ulcers   Musculoskeletal: No clubbing, No cyanosis  Vascular: Peripheral pulses palpable 2+ bilaterally  Psychiatry: Mood & affect appropriate  Lymph: No peripheral edema.     I&O's Summary    25 Oct 2021 07:01  -  26 Oct 2021 07:00  --------------------------------------------------------  IN: 120 mL / OUT: 250 mL / NET: -130 mL        LABS: All Labs Reviewed:                        11.3   7.78  )-----------( 421      ( 26 Oct 2021 08:12 )             36.2     26 Oct 2021 08:12    140    |  106    |  12     ----------------------------<  83     4.3     |  27     |  0.46   25 Oct 2021 08:48    140    |  106    |  13     ----------------------------<  71     4.0     |  26     |  0.49     Ca    8.8        26 Oct 2021 08:12  Ca    8.5        25 Oct 2021 08:48    PT/INR - ( 26 Oct 2021 08:12 )   PT: 12.5 sec;   INR: 1.07 ratio       PTT - ( 26 Oct 2021 08:12 )  PTT:33.1 sec  < from: 12 Lead ECG (02.11.19 @ 13:15) >    Ventricular Rate 92 BPM  Atrial Rate 92 BPM  P-R Interval 170 ms  QRS Duration 84 ms  Q-T Interval 374 ms  QTC Calculation(Bezet) 462 ms  P Axis 67 degrees  R Axis -76 degrees  T Axis 17 degrees  Diagnosis Line Normal sinus rhythm  Left axis deviation  Abnormal ECG  No previous ECGs available  Confirmed by GRIS FINLEY ([id number]) on 2/11/2019 4:56:28 PM    < end of copied text >    < from: MR Lumbar Spine No Cont (10.25.21 @ 14:31) >    EXAM:  MR SPINE LUMBAR                          EXAM:  MR SPINE THORACIC                            PROCEDURE DATE:  10/25/2021          INTERPRETATION:  MR THORACIC SPINE, MR LUMBAR SPINE    History: Pain. r/o Trauma. Rule out ligamentous injury, status post MF, possible undiagnosed AS. Additional history per EMR: "66y f w/ Nondisplaced subacute left L1/L2 TP fx, age indeterminant VCF T12, hypertrophic ligamentum flavum, minimal left lat subluxation T11-12, break in ALL ossification    Comparison: CT thoracic lumbar spine 10/23/2021    Technique: Multiplanar, multisequence magnetic resonance imaging of the thoracic and lumbar spine regions was performed without contrast.    FINDINGS:    FINDINGS:    The lower cervical spine is partially and suboptimally imaged    THORACIC SPINE:    Alignment, vertebral bodies, intervertebral disc space and marrow:  An exaggerated kyphosis is redemonstrated. Thin ossification of the anterior longitudinal ligament better appreciated on prior CT.  The anterior longitudinal ligament is disrupted at T11-T12, corresponding to the break in ligament ossification seen on recent CT.  Additionally, there is associated widening of the anterior T11-T12 disc space, which demonstrate abnormal intense fluid signalat its anterior two thirds and posterior one third portion (image 8, series 5). There is minimal retrolisthesis of T11 on T12, with lateral subluxation seen on CT. However, the posterior longitudinal ligament appears grossly intact, with questionableminimal fluid signal (image 8, series 5 for example).  Mild compression deformity of the anterior superior T12 endplate (25% height loss), with corresponding marrow edema, suggesting acuity. Small Schmorl's node at inferior T12 endplate.   Small additional marrow edema within the adjacent posterior inferior T11 vertebral body, may reflect bone contusion given no definite corresponding fracture line on CT.  Edema within the T11-T12 intraspinous space and adjacent spinous processes and paraspinal soft tissues, suggesting a spinous ligamentous sprain/injury, with associated T11-T12 spinous process contusions and paraspinal contusion (image 6, series 5).    Slight diffuse T1 hypointense marrow signal, nonspecific.    Spinal Canal: No significant spinal canal stenosis is identified within the thoracic spine and at the level of the described injury at T11-T12.    Neural foramina: No neuroforaminal narrowing identified. Multilevel facet arthrosis and ligamentum flavum ossification noted.    Prevertebral and Intradural/extradural space: Mild prevertebral edema/hemorrhage is noted at the level of T11-T12 injury (images 5-11, series 5). No significant epidural fluid collection/hematoma is identified. Evaluation.    Lung/chest wall: Respiratory motion limits evaluation of the lungs. Dependent posterior atelectasis is noted.    LUMBAR SPINE:  Examination is slightly degraded due to patient motion.    Alignment:  There is a slightly exaggerated lumbar lordosis.  Minimal grade 1 retrolisthesis of L2 on L3, likely degenerative in nature, given lack of pars interarticularis defects.    Vertebrae, intervertebral disc and marrow:  Vertebral body heights are grossly preserved, with minimal degenerative irregularity and small Schmorl's node at inferior L1 and L2 endplates.  Previously described nondisplaced cortical buckle fracture of the left L1 and L2 transverse process, better appreciated on prior CT. No corresponding marrow edema, suggesting chronicity.    Slight diffuse heterogeneous T1 hypointense marrow signal, nonspecific.      Spinal Canal: No significant spinal canal stenosis is identified.    Neural foramina: No high-grade neuroforaminal narrowing is identified. Varying degrees of mild multilevel neuroforaminal narrowing, due to marked facet hypertrophy/arthrosis. Ligamentum flavum ossification and marked facet hypertrophy encroaches within the spinal canal is noted at multiple levels    Prevertebral and Intradural/extradural space: No prevertebral soft tissue swelling/edema or significant epidural fluid collection appreciated, within lumbar spine.    Paraspinal soft tissues: Mild STIR hyperintense signal within the bilateral paraspinal soft tissues, may reflect artifact versus mild nonspecific edema. Fatty atrophy of the lumbosacral paraspinal musculature.  Abdominal/Retroperitoneal soft tissues: Large 2.1 cm T2 hyperintense left renal lesion, suboptimally evaluated but likely renal cyst. Similar additional smaller renal lesions noted. Partially imaged gallbladder appears distended, with gallstones. The common bile duct appears slightly prominent (measures 9 mm).    The partially visualized sacrum and sacroiliac joints appear grossly intact.      IMPRESSION:    1.  Acute compression fracture of superior T12 vertebral body (25% height loss), with disruption of the ossified anterior longitudinal ligament, slight widening of anterior T11-T12 disc space and T11-T12 interspinous ligament edema/injury. Abnormal fluid signal within anterior and posterior T11-T12 disc, with questionable minimal edema within the posterior longitudinal ligament. Findings are consistent with unstable 3 column spinal injury, in the setting of possible ankylosing spondylitis.    2.  Bone contusions of T11-T12 spinous processes, withadjacent paraspinal soft tissue contusion. Additional small bone contusion at posterior inferior T11 vertebral body.    3.  Small prevertebral edema/hemorrhage at T11-T12. No significant epidural hematoma or spinal canal stenosis.    4.  Slight diffuse T1 hypointense marrow signal, nonspecific but may suggest anemia amongst multiple other etiologies such as lymphoproliferative disorder, smoking or obesity. Correlate clinically.        Findings were discussed with Dr. Jayesh Calderón (orthopedic surgery service) via telephone on 10/25/2021 at 7:27 PM with verbal read back.    --- End of Report ---            RUTHIE MORE MD; Attending Radiologist  This document has been electronically signed. Oct 25 2021  8:01PM    < end of copied text >  < from: MR Thoracic Spine No Cont (10.25.21 @ 14:28) >    EXAM:  MR SPINE LUMBAR                          EXAM:  MR SPINE THORACIC                            PROCEDURE DATE:  10/25/2021          INTERPRETATION:  MR THORACIC SPINE, MR LUMBAR SPINE    History: Pain. r/o Trauma. Rule out ligamentous injury, status post MF, possible undiagnosed AS. Additional history per EMR: "66y f w/ Nondisplaced subacute left L1/L2 TP fx, age indeterminant VCF T12, hypertrophic ligamentum flavum, minimal left lat subluxation T11-12, break in ALL ossification    Comparison: CT thoracic lumbar spine 10/23/2021    Technique: Multiplanar, multisequence magnetic resonance imaging of the thoracic and lumbar spine regions was performed without contrast.    FINDINGS:    FINDINGS:    The lower cervical spine is partially and suboptimally imaged    THORACIC SPINE:    Alignment, vertebral bodies, intervertebral disc space and marrow:  An exaggerated kyphosis is redemonstrated. Thin ossification of the anterior longitudinal ligament better appreciated on prior CT.  The anterior longitudinal ligament is disrupted at T11-T12, corresponding to the break in ligament ossification seen on recent CT.  Additionally, there is associated widening of the anterior T11-T12 disc space, which demonstrate abnormal intense fluid signalat its anterior two thirds and posterior one third portion (image 8, series 5). There is minimal retrolisthesis of T11 on T12, with lateral subluxation seen on CT. However, the posterior longitudinal ligament appears grossly intact, with questionableminimal fluid signal (image 8, series 5 for example).  Mild compression deformity of the anterior superior T12 endplate (25% height loss), with corresponding marrow edema, suggesting acuity. Small Schmorl's node at inferior T12 endplate.   Small additional marrow edema within the adjacent posterior inferior T11 vertebral body, may reflect bone contusion given no definite corresponding fracture line on CT.  Edema within the T11-T12 intraspinous space and adjacent spinous processes and paraspinal soft tissues, suggesting a spinous ligamentous sprain/injury, with associated T11-T12 spinous process contusions and paraspinal contusion (image 6, series 5).    Slight diffuse T1 hypointense marrow signal, nonspecific.    Spinal Canal: No significant spinal canal stenosis is identified within the thoracic spine and at the level of the described injury at T11-T12.    Neural foramina: No neuroforaminal narrowing identified. Multilevel facet arthrosis and ligamentum flavum ossification noted.    Prevertebral and Intradural/extradural space: Mild prevertebral edema/hemorrhage is noted at the level of T11-T12 injury (images 5-11, series 5). No significant epidural fluid collection/hematoma is identified. Evaluation.    Lung/chest wall: Respiratory motion limits evaluation of the lungs. Dependent posterior atelectasis is noted.    LUMBAR SPINE:  Examination is slightly degraded due to patient motion.    Alignment:  There is a slightly exaggerated lumbar lordosis.  Minimal grade 1 retrolisthesis of L2 on L3, likely degenerative in nature, given lack of pars interarticularis defects.    Vertebrae, intervertebral disc and marrow:  Vertebral body heights are grossly preserved, with minimal degenerative irregularity and small Schmorl's node at inferior L1 and L2 endplates.  Previously described nondisplaced cortical buckle fracture of the left L1 and L2 transverse process, better appreciated on prior CT. No corresponding marrow edema, suggesting chronicity.    Slight diffuse heterogeneous T1 hypointense marrow signal, nonspecific.      Spinal Canal: No significant spinal canal stenosis is identified.    Neural foramina: No high-grade neuroforaminal narrowing is identified. Varying degrees of mild multilevel neuroforaminal narrowing, due to marked facet hypertrophy/arthrosis. Ligamentum flavum ossification and marked facet hypertrophy encroaches within the spinal canal is noted at multiple levels    Prevertebral and Intradural/extradural space: No prevertebral soft tissue swelling/edema or significant epidural fluid collection appreciated, within lumbar spine.    Paraspinal soft tissues: Mild STIR hyperintense signal within the bilateral paraspinal soft tissues, may reflect artifact versus mild nonspecific edema. Fatty atrophy of the lumbosacral paraspinal musculature.  Abdominal/Retroperitoneal soft tissues: Large 2.1 cm T2 hyperintense left renal lesion, suboptimally evaluated but likely renal cyst. Similar additional smaller renal lesions noted. Partially imaged gallbladder appears distended, with gallstones. The common bile duct appears slightly prominent (measures 9 mm).    The partially visualized sacrum and sacroiliac joints appear grossly intact.      IMPRESSION:    1.  Acute compression fracture of superior T12 vertebral body (25% height loss), with disruption of the ossified anterior longitudinal ligament, slight widening of anterior T11-T12 disc space and T11-T12 interspinous ligament edema/injury. Abnormal fluid signal within anterior and posterior T11-T12 disc, with questionable minimal edema within the posterior longitudinal ligament. Findings are consistent with unstable 3 column spinal injury, in the setting of possible ankylosing spondylitis.    2.  Bone contusions of T11-T12 spinous processes, withadjacent paraspinal soft tissue contusion. Additional small bone contusion at posterior inferior T11 vertebral body.    3.  Small prevertebral edema/hemorrhage at T11-T12. No significant epidural hematoma or spinal canal stenosis.    4.  Slight diffuse T1 hypointense marrow signal, nonspecific but may suggest anemia amongst multiple other etiologies such as lymphoproliferative disorder, smoking or obesity. Correlate clinically.        Findings were discussed with Dr. Jayesh Calderón (orthopedic surgery service) via telephone on 10/25/2021 at 7:27 PM with verbal read back.    --- End of Report ---            RUTHIE MORE MD; Attending Radiologist  This document has been electronically signed. Oct 25 2021  8:01PM    < end of copied text >               [facility] - Eda Carnes, Joyce Astorga, Apryl, Romero, Sharmin Guerrero  Office Number: [phone]    Initial Consult Note  CHIEF COMPLAINT: Patient is a 66y old  Female who presents with a chief complaint of Intractable mid and lower back pain. (26 Oct 2021 07:16)    HPI:  Chart and labs  reviewed.  BRIANNE BARNES is a 67 YO Female  presented to ED for a severe back pain. Patient states that trip and fall 3 weeks ago landing on back.  Patient was taking flexeril since then with minimal relief and now feels pain worsening. Pain is in lower and mid back and radiates around her stomach. Denies nausea, vomiting, diarrhea, fever or chills.  She has no numbness or tingling but she feels weak. No saddle anesthesia or difficult with urination/ BM. Patient taking ibuprofen last dose 1030 am.   Patient with PMH breast CA (now in remission), benign brain tumor s/p resection, skin CA, seizure     Interval hx: Denies any chest pain, dyspnea, palpitations, PND, orthopnea, near syncope, syncope, lower extremity edema, stroke like symptoms. She is able to do ADLs at baseline.     Allergies    Cipro (Unknown)  vancomycin (Other)    Intolerances      PAST MEDICAL & SURGICAL HISTORY:  Seizures  Brain cancer  Breast cancer  Melanoma  History of hip replacement      MEDICATIONS  (STANDING):  carBAMazepine 200 milliGRAM(s) Oral two times a day  diVALproex  milliGRAM(s) Oral <User Schedule>  lidocaine   4% Patch 1 Patch Transdermal every 24 hours  pantoprazole    Tablet 40 milliGRAM(s) Oral before breakfast    MEDICATIONS  (PRN):  acetaminophen     Tablet .. 650 milliGRAM(s) Oral every 4 hours PRN Mild Pain (1 - 3)  ALPRAZolam 0.25 milliGRAM(s) Oral three times a day PRN anxiety  methocarbamol 1500 milliGRAM(s) Oral three times a day PRN Muscle Spasm  morphine  - Injectable 2 milliGRAM(s) IV Push every 6 hours PRN breakthrough pain  oxyCODONE    IR 5 milliGRAM(s) Oral every 4 hours PRN Moderate Pain (4 - 6)  oxyCODONE    IR 10 milliGRAM(s) Oral every 4 hours PRN Severe Pain (7 - 10)    FAMILY HISTORY:  No SCD or juan luis CAD in family      SOCIAL HISTORY    Marital Status:   Occupation:   Lives with:     SUBSTANCE USE  Tobacco Usage:  (x ) None ( ) never smoked   ( ) former smoker  ( ) current smoker; Packs per day:   Alcohol Usage: (x ) none  ( ) occasional ( ) 2-3 times a week ( ) daily; Last drink:   Recreational drugs (x ) None    REVIEW OF SYSTEMS   CONSTITUTIONAL: No fevers, No chills, No fatigue, No weight gain  EYES: No vision changes, No vertigo, No throat pain   ENT: No congestion, No ear pain, No sore throat.  NECK: No pain, No stiffness  RESPIRATORY: No shortness of breath, No cough, No wheezing, No hemoptysis  CARDIOVASCULAR: No chest pain. No palpitations, No BACA, No orthopnea, No PND, No pleuritic pain  GASTROINTESTINAL: No abdominal pain, No nausea, No vomiting, No hematemesis, No diarrhea No constipation. No melena  GENITOURINARY: No dysuria, No frequency, No incontinence, No hematuria  NEUROLOGICAL: No dizziness, No lightheadedness, No syncope, No LOC, No headache, No numbness, No weakness  MUSCULOSKELETAL: c/o back pain   PSYCHIATRIC: No anxiety, No depression  DERMATOLOGY: No diaphoresis. No itching, No rashes, No pressure ulcers  HEME/LYMPH: No easy bruising, or bleeding gums  All other review of systems is negative unless indicated above.    VITAL SIGNS:   Vital Signs Last 24 Hrs  T(C): 36.9 (26 Oct 2021 11:47), Max: 36.9 (25 Oct 2021 21:25)  T(F): 98.4 (26 Oct 2021 11:47), Max: 98.4 (25 Oct 2021 21:25)  HR: 89 (26 Oct 2021 11:47) (85 - 93)  BP: 121/74 (26 Oct 2021 11:47) (101/66 - 121/74)  RR: 17 (26 Oct 2021 11:47) (16 - 17)  SpO2: 96% (26 Oct 2021 11:47) (96% - 97%)    Physical Exam:    Appearance: NAD, no distress, alert,   HEENT: Moist Mucous Membranes, Anicteric  Cardiovascular: Regular rate and rhythm, Normal S1 S2, No JVD, No murmurs, No rubs, gallops or clicks  Respiratory: Lungs clear to auscultation. No rales, No rhonchi, No wheezing. No tenderness to palpation  Gastrointestinal:  Soft, Non-tender, + BS  Neurologic: Non-focal  Skin: Warm and dry, No rashes, No ecchymosis, No cyanosis, No ulcers   Musculoskeletal: No clubbing, No cyanosis  Vascular: Peripheral pulses palpable 2+ bilaterally  Psychiatry: Mood & affect appropriate  Lymph: No peripheral edema.     I&O's Summary    25 Oct 2021 07:01  -  26 Oct 2021 07:00  --------------------------------------------------------  IN: 120 mL / OUT: 250 mL / NET: -130 mL        LABS: All Labs Reviewed:                        11.3   7.78  )-----------( 421      ( 26 Oct 2021 08:12 )             36.2     26 Oct 2021 08:12    140    |  106    |  12     ----------------------------<  83     4.3     |  27     |  0.46   25 Oct 2021 08:48    140    |  106    |  13     ----------------------------<  71     4.0     |  26     |  0.49     Ca    8.8        26 Oct 2021 08:12  Ca    8.5        25 Oct 2021 08:48    PT/INR - ( 26 Oct 2021 08:12 )   PT: 12.5 sec;   INR: 1.07 ratio       PTT - ( 26 Oct 2021 08:12 )  PTT:33.1 sec  < from: 12 Lead ECG (02.11.19 @ 13:15) >    Ventricular Rate 92 BPM  Atrial Rate 92 BPM  P-R Interval 170 ms  QRS Duration 84 ms  Q-T Interval 374 ms  QTC Calculation(Bezet) 462 ms  P Axis 67 degrees  R Axis -76 degrees  T Axis 17 degrees  Diagnosis Line Normal sinus rhythm  Left axis deviation  Abnormal ECG  No previous ECGs available  Confirmed by GRIS FINLEY ([id number]) on 2/11/2019 4:56:28 PM    < end of copied text >    < from: MR Lumbar Spine No Cont (10.25.21 @ 14:31) >    EXAM:  MR SPINE LUMBAR                          EXAM:  MR SPINE THORACIC                            PROCEDURE DATE:  10/25/2021          INTERPRETATION:  MR THORACIC SPINE, MR LUMBAR SPINE    History: Pain. r/o Trauma. Rule out ligamentous injury, status post MF, possible undiagnosed AS. Additional history per EMR: "66y f w/ Nondisplaced subacute left L1/L2 TP fx, age indeterminant VCF T12, hypertrophic ligamentum flavum, minimal left lat subluxation T11-12, break in ALL ossification    Comparison: CT thoracic lumbar spine 10/23/2021    Technique: Multiplanar, multisequence magnetic resonance imaging of the thoracic and lumbar spine regions was performed without contrast.    FINDINGS:    FINDINGS:    The lower cervical spine is partially and suboptimally imaged    THORACIC SPINE:    Alignment, vertebral bodies, intervertebral disc space and marrow:  An exaggerated kyphosis is redemonstrated. Thin ossification of the anterior longitudinal ligament better appreciated on prior CT.  The anterior longitudinal ligament is disrupted at T11-T12, corresponding to the break in ligament ossification seen on recent CT.  Additionally, there is associated widening of the anterior T11-T12 disc space, which demonstrate abnormal intense fluid signalat its anterior two thirds and posterior one third portion (image 8, series 5). There is minimal retrolisthesis of T11 on T12, with lateral subluxation seen on CT. However, the posterior longitudinal ligament appears grossly intact, with questionableminimal fluid signal (image 8, series 5 for example).  Mild compression deformity of the anterior superior T12 endplate (25% height loss), with corresponding marrow edema, suggesting acuity. Small Schmorl's node at inferior T12 endplate.   Small additional marrow edema within the adjacent posterior inferior T11 vertebral body, may reflect bone contusion given no definite corresponding fracture line on CT.  Edema within the T11-T12 intraspinous space and adjacent spinous processes and paraspinal soft tissues, suggesting a spinous ligamentous sprain/injury, with associated T11-T12 spinous process contusions and paraspinal contusion (image 6, series 5).    Slight diffuse T1 hypointense marrow signal, nonspecific.    Spinal Canal: No significant spinal canal stenosis is identified within the thoracic spine and at the level of the described injury at T11-T12.    Neural foramina: No neuroforaminal narrowing identified. Multilevel facet arthrosis and ligamentum flavum ossification noted.    Prevertebral and Intradural/extradural space: Mild prevertebral edema/hemorrhage is noted at the level of T11-T12 injury (images 5-11, series 5). No significant epidural fluid collection/hematoma is identified. Evaluation.    Lung/chest wall: Respiratory motion limits evaluation of the lungs. Dependent posterior atelectasis is noted.    LUMBAR SPINE:  Examination is slightly degraded due to patient motion.    Alignment:  There is a slightly exaggerated lumbar lordosis.  Minimal grade 1 retrolisthesis of L2 on L3, likely degenerative in nature, given lack of pars interarticularis defects.    Vertebrae, intervertebral disc and marrow:  Vertebral body heights are grossly preserved, with minimal degenerative irregularity and small Schmorl's node at inferior L1 and L2 endplates.  Previously described nondisplaced cortical buckle fracture of the left L1 and L2 transverse process, better appreciated on prior CT. No corresponding marrow edema, suggesting chronicity.    Slight diffuse heterogeneous T1 hypointense marrow signal, nonspecific.      Spinal Canal: No significant spinal canal stenosis is identified.    Neural foramina: No high-grade neuroforaminal narrowing is identified. Varying degrees of mild multilevel neuroforaminal narrowing, due to marked facet hypertrophy/arthrosis. Ligamentum flavum ossification and marked facet hypertrophy encroaches within the spinal canal is noted at multiple levels    Prevertebral and Intradural/extradural space: No prevertebral soft tissue swelling/edema or significant epidural fluid collection appreciated, within lumbar spine.    Paraspinal soft tissues: Mild STIR hyperintense signal within the bilateral paraspinal soft tissues, may reflect artifact versus mild nonspecific edema. Fatty atrophy of the lumbosacral paraspinal musculature.  Abdominal/Retroperitoneal soft tissues: Large 2.1 cm T2 hyperintense left renal lesion, suboptimally evaluated but likely renal cyst. Similar additional smaller renal lesions noted. Partially imaged gallbladder appears distended, with gallstones. The common bile duct appears slightly prominent (measures 9 mm).    The partially visualized sacrum and sacroiliac joints appear grossly intact.      IMPRESSION:    1.  Acute compression fracture of superior T12 vertebral body (25% height loss), with disruption of the ossified anterior longitudinal ligament, slight widening of anterior T11-T12 disc space and T11-T12 interspinous ligament edema/injury. Abnormal fluid signal within anterior and posterior T11-T12 disc, with questionable minimal edema within the posterior longitudinal ligament. Findings are consistent with unstable 3 column spinal injury, in the setting of possible ankylosing spondylitis.    2.  Bone contusions of T11-T12 spinous processes, withadjacent paraspinal soft tissue contusion. Additional small bone contusion at posterior inferior T11 vertebral body.    3.  Small prevertebral edema/hemorrhage at T11-T12. No significant epidural hematoma or spinal canal stenosis.    4.  Slight diffuse T1 hypointense marrow signal, nonspecific but may suggest anemia amongst multiple other etiologies such as lymphoproliferative disorder, smoking or obesity. Correlate clinically.        Findings were discussed with Dr. Jayesh Calderón (orthopedic surgery service) via telephone on 10/25/2021 at 7:27 PM with verbal read back.    --- End of Report ---            RUTHIE MORE MD; Attending Radiologist  This document has been electronically signed. Oct 25 2021  8:01PM    < end of copied text >  < from: MR Thoracic Spine No Cont (10.25.21 @ 14:28) >    EXAM:  MR SPINE LUMBAR                          EXAM:  MR SPINE THORACIC                            PROCEDURE DATE:  10/25/2021          INTERPRETATION:  MR THORACIC SPINE, MR LUMBAR SPINE    History: Pain. r/o Trauma. Rule out ligamentous injury, status post MF, possible undiagnosed AS. Additional history per EMR: "66y f w/ Nondisplaced subacute left L1/L2 TP fx, age indeterminant VCF T12, hypertrophic ligamentum flavum, minimal left lat subluxation T11-12, break in ALL ossification    Comparison: CT thoracic lumbar spine 10/23/2021    Technique: Multiplanar, multisequence magnetic resonance imaging of the thoracic and lumbar spine regions was performed without contrast.    FINDINGS:    FINDINGS:    The lower cervical spine is partially and suboptimally imaged    THORACIC SPINE:    Alignment, vertebral bodies, intervertebral disc space and marrow:  An exaggerated kyphosis is redemonstrated. Thin ossification of the anterior longitudinal ligament better appreciated on prior CT.  The anterior longitudinal ligament is disrupted at T11-T12, corresponding to the break in ligament ossification seen on recent CT.  Additionally, there is associated widening of the anterior T11-T12 disc space, which demonstrate abnormal intense fluid signalat its anterior two thirds and posterior one third portion (image 8, series 5). There is minimal retrolisthesis of T11 on T12, with lateral subluxation seen on CT. However, the posterior longitudinal ligament appears grossly intact, with questionableminimal fluid signal (image 8, series 5 for example).  Mild compression deformity of the anterior superior T12 endplate (25% height loss), with corresponding marrow edema, suggesting acuity. Small Schmorl's node at inferior T12 endplate.   Small additional marrow edema within the adjacent posterior inferior T11 vertebral body, may reflect bone contusion given no definite corresponding fracture line on CT.  Edema within the T11-T12 intraspinous space and adjacent spinous processes and paraspinal soft tissues, suggesting a spinous ligamentous sprain/injury, with associated T11-T12 spinous process contusions and paraspinal contusion (image 6, series 5).    Slight diffuse T1 hypointense marrow signal, nonspecific.    Spinal Canal: No significant spinal canal stenosis is identified within the thoracic spine and at the level of the described injury at T11-T12.    Neural foramina: No neuroforaminal narrowing identified. Multilevel facet arthrosis and ligamentum flavum ossification noted.    Prevertebral and Intradural/extradural space: Mild prevertebral edema/hemorrhage is noted at the level of T11-T12 injury (images 5-11, series 5). No significant epidural fluid collection/hematoma is identified. Evaluation.    Lung/chest wall: Respiratory motion limits evaluation of the lungs. Dependent posterior atelectasis is noted.    LUMBAR SPINE:  Examination is slightly degraded due to patient motion.    Alignment:  There is a slightly exaggerated lumbar lordosis.  Minimal grade 1 retrolisthesis of L2 on L3, likely degenerative in nature, given lack of pars interarticularis defects.    Vertebrae, intervertebral disc and marrow:  Vertebral body heights are grossly preserved, with minimal degenerative irregularity and small Schmorl's node at inferior L1 and L2 endplates.  Previously described nondisplaced cortical buckle fracture of the left L1 and L2 transverse process, better appreciated on prior CT. No corresponding marrow edema, suggesting chronicity.    Slight diffuse heterogeneous T1 hypointense marrow signal, nonspecific.      Spinal Canal: No significant spinal canal stenosis is identified.    Neural foramina: No high-grade neuroforaminal narrowing is identified. Varying degrees of mild multilevel neuroforaminal narrowing, due to marked facet hypertrophy/arthrosis. Ligamentum flavum ossification and marked facet hypertrophy encroaches within the spinal canal is noted at multiple levels    Prevertebral and Intradural/extradural space: No prevertebral soft tissue swelling/edema or significant epidural fluid collection appreciated, within lumbar spine.    Paraspinal soft tissues: Mild STIR hyperintense signal within the bilateral paraspinal soft tissues, may reflect artifact versus mild nonspecific edema. Fatty atrophy of the lumbosacral paraspinal musculature.  Abdominal/Retroperitoneal soft tissues: Large 2.1 cm T2 hyperintense left renal lesion, suboptimally evaluated but likely renal cyst. Similar additional smaller renal lesions noted. Partially imaged gallbladder appears distended, with gallstones. The common bile duct appears slightly prominent (measures 9 mm).    The partially visualized sacrum and sacroiliac joints appear grossly intact.      IMPRESSION:    1.  Acute compression fracture of superior T12 vertebral body (25% height loss), with disruption of the ossified anterior longitudinal ligament, slight widening of anterior T11-T12 disc space and T11-T12 interspinous ligament edema/injury. Abnormal fluid signal within anterior and posterior T11-T12 disc, with questionable minimal edema within the posterior longitudinal ligament. Findings are consistent with unstable 3 column spinal injury, in the setting of possible ankylosing spondylitis.    2.  Bone contusions of T11-T12 spinous processes, withadjacent paraspinal soft tissue contusion. Additional small bone contusion at posterior inferior T11 vertebral body.    3.  Small prevertebral edema/hemorrhage at T11-T12. No significant epidural hematoma or spinal canal stenosis.    4.  Slight diffuse T1 hypointense marrow signal, nonspecific but may suggest anemia amongst multiple other etiologies such as lymphoproliferative disorder, smoking or obesity. Correlate clinically.        Findings were discussed with Dr. Jayesh Calderón (orthopedic surgery service) via telephone on 10/25/2021 at 7:27 PM with verbal read back.    --- End of Report ---            RUTHIE MORE MD; Attending Radiologist  This document has been electronically signed. Oct 25 2021  8:01PM    < end of copied text >

## 2021-10-26 NOTE — DISCHARGE NOTE PROVIDER - CARE PROVIDER_API CALL
Marina Beach (DO)  Orthopaedic Surgery Surgery  [address], [address]  [place] [address]  Phone: [phone]  Fax: [phone]  Follow Up Time:     [place] [address]  Phone: [phone]  Fax: [phone]  Follow Up Time:

## 2021-10-26 NOTE — CHART NOTE - NSCHARTNOTEFT_GEN_A_CORE
Do you have Advance Directives (HCP / LV / Organ donation / Documentation of oral advance Directive):   (  x  )  yes    (      )    NO                                                                            Do you have LV - Living will :                                                                                                                                             (  x  )  yes    (      )   No    Do you have HCP - Health Care Proxy:                                                                                                                            (   x  )  yes   (       ) N0    Do you have DNR- Do Not Resuscitate :                                                                                                                           (      )  yes  (   x     )  No    Do you have DNI- Do Not intubate  :                                                                                                                               (      )  yes   (    x   ) No    Do you have MOLST - Medical orders for Life sustaining treatment  :                                                                    (      ) yes    (   x    ) No    Decision Maker :  (    x ) Patient     (      )  [facility]   (     ) Public Health Law Surrogate     (      ) Surrogate  (       ) Guardian    Goals of Care :  (     x )   Complete Care     (       ) No Limitations                              (       )   Comfort Care       (       )  Hospice                               (      )   Limited medical Intervention / s    Medical Interventions :   (   x     )   CPR       (        )  DNR                                               (    x    )  Intubation with MV - Mechanical Ventilation  (   x   ) BIPAP/CPAP    (         )   DNI                                               (    x     )  Artificial Nutrition -  IVF, TPN / PPN, Tube Feeds             (         )   No Feeding Tube                                                (    x    ) Use Antibiotics                         (          ) No Antibiotics                                                (    x     ) Blood and Blood Products     (         )   No Blood or Blood products                                                (      x    )  Dialysis                                    (         )  No Dialysis                                                (          )  Medical Management only  (         )  No Invasive Interventions or Surgery  Time spent :                        (    x   ) upto 30 minutes                       (           )   more than 30 minutes  ACP reviewed and discussed

## 2021-10-26 NOTE — DISCHARGE NOTE PROVIDER - CARE PROVIDERS DIRECT ADDRESSES
,DirectAddress_Unknown,[email or web address]

## 2021-10-27 ENCOUNTER — INPATIENT (INPATIENT)
Facility: HOSPITAL | Age: 66
LOS: 18 days | Discharge: NURSING, REHAB OR LONG-TERM CARE | DRG: 457 | End: 2021-11-15
Attending: GENERAL PRACTICE | Admitting: GENERAL PRACTICE
Payer: MEDICARE

## 2021-10-27 VITALS
RESPIRATION RATE: 18 BRPM | DIASTOLIC BLOOD PRESSURE: 74 MMHG | OXYGEN SATURATION: 94 % | SYSTOLIC BLOOD PRESSURE: 116 MMHG | TEMPERATURE: 99 F | HEART RATE: 87 BPM

## 2021-10-27 VITALS
TEMPERATURE: 98 F | HEART RATE: 84 BPM | SYSTOLIC BLOOD PRESSURE: 134 MMHG | WEIGHT: 118.61 LBS | OXYGEN SATURATION: 98 % | RESPIRATION RATE: 17 BRPM | DIASTOLIC BLOOD PRESSURE: 68 MMHG

## 2021-10-27 DIAGNOSIS — S52.501A UNSPECIFIED FRACTURE OF THE LOWER END OF RIGHT RADIUS, INITIAL ENCOUNTER FOR CLOSED FRACTURE: ICD-10-CM

## 2021-10-27 DIAGNOSIS — Z96.649 PRESENCE OF UNSPECIFIED ARTIFICIAL HIP JOINT: Chronic | ICD-10-CM

## 2021-10-27 PROCEDURE — 86769 SARS-COV-2 COVID-19 ANTIBODY: CPT

## 2021-10-27 PROCEDURE — G1004: CPT

## 2021-10-27 PROCEDURE — 72131 CT LUMBAR SPINE W/O DYE: CPT | Mod: ME

## 2021-10-27 PROCEDURE — 81001 URINALYSIS AUTO W/SCOPE: CPT

## 2021-10-27 PROCEDURE — 99222 1ST HOSP IP/OBS MODERATE 55: CPT | Mod: 57

## 2021-10-27 PROCEDURE — U0003: CPT

## 2021-10-27 PROCEDURE — 72146 MRI CHEST SPINE W/O DYE: CPT

## 2021-10-27 PROCEDURE — 85610 PROTHROMBIN TIME: CPT

## 2021-10-27 PROCEDURE — 81374 HLA I TYPING 1 ANTIGEN LR: CPT

## 2021-10-27 PROCEDURE — 72070 X-RAY EXAM THORAC SPINE 2VWS: CPT

## 2021-10-27 PROCEDURE — 86850 RBC ANTIBODY SCREEN: CPT

## 2021-10-27 PROCEDURE — 87086 URINE CULTURE/COLONY COUNT: CPT

## 2021-10-27 PROCEDURE — 85025 COMPLETE CBC W/AUTO DIFF WBC: CPT

## 2021-10-27 PROCEDURE — U0005: CPT

## 2021-10-27 PROCEDURE — 85027 COMPLETE CBC AUTOMATED: CPT

## 2021-10-27 PROCEDURE — 36415 COLL VENOUS BLD VENIPUNCTURE: CPT

## 2021-10-27 PROCEDURE — 72100 X-RAY EXAM L-S SPINE 2/3 VWS: CPT

## 2021-10-27 PROCEDURE — 96374 THER/PROPH/DIAG INJ IV PUSH: CPT

## 2021-10-27 PROCEDURE — 86900 BLOOD TYPING SEROLOGIC ABO: CPT

## 2021-10-27 PROCEDURE — 99285 EMERGENCY DEPT VISIT HI MDM: CPT | Mod: 25

## 2021-10-27 PROCEDURE — 93005 ELECTROCARDIOGRAM TRACING: CPT

## 2021-10-27 PROCEDURE — 71110 X-RAY EXAM RIBS BIL 3 VIEWS: CPT

## 2021-10-27 PROCEDURE — 86803 HEPATITIS C AB TEST: CPT

## 2021-10-27 PROCEDURE — 85730 THROMBOPLASTIN TIME PARTIAL: CPT

## 2021-10-27 PROCEDURE — 72128 CT CHEST SPINE W/O DYE: CPT | Mod: MG

## 2021-10-27 PROCEDURE — 80053 COMPREHEN METABOLIC PANEL: CPT

## 2021-10-27 PROCEDURE — 72148 MRI LUMBAR SPINE W/O DYE: CPT

## 2021-10-27 PROCEDURE — 99232 SBSQ HOSP IP/OBS MODERATE 35: CPT

## 2021-10-27 PROCEDURE — 86901 BLOOD TYPING SEROLOGIC RH(D): CPT

## 2021-10-27 PROCEDURE — 80048 BASIC METABOLIC PNL TOTAL CA: CPT

## 2021-10-27 RX ORDER — ONDANSETRON 8 MG/1
4 TABLET, FILM COATED ORAL EVERY 8 HOURS
Refills: 0 | Status: DISCONTINUED | OUTPATIENT
Start: 2021-10-27 | End: 2021-11-09

## 2021-10-27 RX ORDER — OXYCODONE HYDROCHLORIDE 5 MG/1
5 TABLET ORAL EVERY 4 HOURS
Refills: 0 | Status: DISCONTINUED | OUTPATIENT
Start: 2021-10-27 | End: 2021-10-27

## 2021-10-27 RX ORDER — METHOCARBAMOL 500 MG/1
750 TABLET, FILM COATED ORAL THREE TIMES A DAY
Refills: 0 | Status: DISCONTINUED | OUTPATIENT
Start: 2021-10-27 | End: 2021-11-09

## 2021-10-27 RX ORDER — DIVALPROEX SODIUM 500 MG/1
250 TABLET, DELAYED RELEASE ORAL
Refills: 0 | Status: DISCONTINUED | OUTPATIENT
Start: 2021-10-27 | End: 2021-11-09

## 2021-10-27 RX ORDER — OXYCODONE HYDROCHLORIDE 5 MG/1
10 TABLET ORAL EVERY 4 HOURS
Refills: 0 | Status: DISCONTINUED | OUTPATIENT
Start: 2021-10-27 | End: 2021-10-27

## 2021-10-27 RX ORDER — TRAMADOL HYDROCHLORIDE 50 MG/1
50 TABLET ORAL EVERY 6 HOURS
Refills: 0 | Status: DISCONTINUED | OUTPATIENT
Start: 2021-10-27 | End: 2021-10-31

## 2021-10-27 RX ORDER — ACETAMINOPHEN 500 MG
650 TABLET ORAL EVERY 6 HOURS
Refills: 0 | Status: DISCONTINUED | OUTPATIENT
Start: 2021-10-27 | End: 2021-11-09

## 2021-10-27 RX ORDER — CARBAMAZEPINE 200 MG
200 TABLET ORAL
Refills: 0 | Status: DISCONTINUED | OUTPATIENT
Start: 2021-10-27 | End: 2021-11-09

## 2021-10-27 RX ORDER — MAGNESIUM HYDROXIDE 400 MG/1
30 TABLET, CHEWABLE ORAL DAILY
Refills: 0 | Status: DISCONTINUED | OUTPATIENT
Start: 2021-10-27 | End: 2021-11-09

## 2021-10-27 RX ORDER — PANTOPRAZOLE SODIUM 20 MG/1
40 TABLET, DELAYED RELEASE ORAL
Refills: 0 | Status: DISCONTINUED | OUTPATIENT
Start: 2021-10-27 | End: 2021-11-09

## 2021-10-27 RX ADMIN — DIVALPROEX SODIUM 250 MILLIGRAM(S): 500 TABLET, DELAYED RELEASE ORAL at 18:10

## 2021-10-27 RX ADMIN — OXYCODONE HYDROCHLORIDE 10 MILLIGRAM(S): 5 TABLET ORAL at 18:10

## 2021-10-27 RX ADMIN — METHOCARBAMOL 750 MILLIGRAM(S): 500 TABLET, FILM COATED ORAL at 22:41

## 2021-10-27 RX ADMIN — DIVALPROEX SODIUM 250 MILLIGRAM(S): 500 TABLET, DELAYED RELEASE ORAL at 05:36

## 2021-10-27 RX ADMIN — Medication 650 MILLIGRAM(S): at 18:10

## 2021-10-27 RX ADMIN — Medication 200 MILLIGRAM(S): at 18:10

## 2021-10-27 RX ADMIN — OXYCODONE HYDROCHLORIDE 10 MILLIGRAM(S): 5 TABLET ORAL at 19:15

## 2021-10-27 RX ADMIN — Medication 650 MILLIGRAM(S): at 19:15

## 2021-10-27 RX ADMIN — LIDOCAINE 1 PATCH: 4 CREAM TOPICAL at 05:35

## 2021-10-27 RX ADMIN — Medication 650 MILLIGRAM(S): at 22:41

## 2021-10-27 RX ADMIN — Medication 200 MILLIGRAM(S): at 05:36

## 2021-10-27 RX ADMIN — Medication 650 MILLIGRAM(S): at 23:41

## 2021-10-27 RX ADMIN — PANTOPRAZOLE SODIUM 40 MILLIGRAM(S): 20 TABLET, DELAYED RELEASE ORAL at 05:36

## 2021-10-27 NOTE — PROGRESS NOTE ADULT - ASSESSMENT
67 YO Female  presented to ED for a severe back pain. Patient states that trip and fall 3 weeks ago landing on back.  Cardiology called for pre/post cardiac optimization     Preop for surgical fixation  - admitted for severe back pain, s/p trip and fall 3 weeks ago  - MRI: Acute compression fracture of superior T12 vertebral body (25% height loss), with disruption of the ossified anterior longitudinal ligament, slight widening of anterior T11-T12 disc space and T11-T12 interspinous ligament edema/injury.   - Planned for surgical fixation for stabilization of the spinal column.   - No active cardiac condition.   - Denies chest pain, palpitation, SOB, syncope, dizziness, lightheadedness, orthopnea, PND, BACA and edema  - EKG demonstrates NSR 1st degree AVB HR 86 bpm, No acute ischemic changes noted.   - Vitals stable, - BP: 131/75 (10-27-21 @ 04:39) (123/67 - 131/75), HR: 76 (10-27 @ 04:39) (76 - 88)  - Patient euvolemic on examination with no overt signs of heart failure or cardiac ischemia.   - No severe valvular abnormalities noted on examination  - Monitor and replete Lytes. Keep K > 4 and Mg > 2  - Pt has no active ischemia, decompensated heart failure, unstable arrythmia, or severe stenotic valvular disease. Patient is optimized from cardiovascular standpoint to proceed with planned procedure with routine hemodynamic monitoring.     - All other medical needs as per primary team.  - Other cardiovascular workup will depend on clinical course.  - Will continue to follow.    Esther Dupont [facility]  Nurse Practitioner - Cardiology   Spectra #[id number]/ [phone]        
BRIANNE BARNES is a 65 YO Female  presented to ED for a severe back pain. Patient states that trip and fall 3 weeks ago landing on back.
BRIANNE BARNES is a 67 YO Female  presented to ED for a severe back pain. Patient states that trip and fall 3 weeks ago landing on back.
BRIANNE BARNES is a 65 YO Female  presented to ED for a severe back pain. Patient states that trip and fall 3 weeks ago landing on back.
BRIANNE BARNES is a 67 YO Female  presented to ED for a severe back pain. Patient states that trip and fall 3 weeks ago landing on back.

## 2021-10-27 NOTE — H&P ADULT - TIME BILLING
Please note that over 70 minutes of time was spent in care of this patient including previsit preparation, in person visit, post visit documentation, review of imaging and extensive discussion with colleagues.

## 2021-10-27 NOTE — H&P ADULT - ATTENDING COMMENTS
Agree with above. The patient is a medically complex 66 year old female with a fracture of T12 in the setting of ankylosed spine. This injury is ~3 weeks old. Neurologically intact. Pain controlled this evening.     We will trial nonoperative care with a TLSO brace. I spoke with the family as well and explained that we will try to ambulate the patient and observe her progress. If she has any increased pain or is unable to tolerate ambulation we may have to provide stability through a surgery.     Current plan will be for DVT ppx, TLSO brace with PT consult for ambulation and repeat CT scan this weekend to monitor alignment. Continue to monitor neurologic exam closely.

## 2021-10-27 NOTE — PROGRESS NOTE ADULT - SUBJECTIVE AND OBJECTIVE BOX
Patient interviewed interviewed and examined at the bedside, well-appearing and in no acute distress. Reporting stable pain in the thoracic/lumbar spine at this time. Denies any new pain, weakness, numbness, or tingling. Denies saddle anesthesia or bladder/bowel incontinence. Patient is aware of plan for transfer to tertiary care center and agrees, no concerns at this time.      VITAL SIGNS:  T(C): 36.8 (27 Oct 2021 04:39), Max: 36.9 (26 Oct 2021 11:47)  T(F): 98.3 (27 Oct 2021 04:39), Max: 98.4 (26 Oct 2021 11:47)  HR: 76 (27 Oct 2021 04:39) (76 - 89)  BP: 131/75 (27 Oct 2021 04:39) (121/74 - 131/75)  RR: 17 (27 Oct 2021 04:39) (17 - 17)  SpO2: 96% (27 Oct 2021 04:39) (96% - 96%)      LABS:                        11.3   7.78  )-----------( 421      ( 26 Oct 2021 08:12 )             36.2     10-26    140  |  106  |  12  ----------------------------<  83  4.3   |  27  |  0.46<L>    Ca    8.8      26 Oct 2021 08:12      PT/INR - ( 26 Oct 2021 08:12 )   PT: 12.5 sec;   INR: 1.07 ratio         PTT - ( 26 Oct 2021 08:12 )  PTT:33.1 sec        PHYSICAL EXAM  GEN: NAD, AAOx3.   SPINE:  [Spinal inspection and palpation deferred in the context of clinically unstable spine prohibitive of unassisted manipulation]   Grossly moving all extremities.  + Median/Radial/Ulnar/Musculocutaneous/Axillary nerves intact.   + Hip Flexors/Quads/Hamstrings/TA/EHL/FHL/GS.   SILT C5-T1.   SILT L2-S1.   + Radial Pulse.   + DP/PT Pulses.   No saddle anesthesia.   Compartments soft and compressible.   Calves non-TTP bilaterally.     Motor:                          Deltoid       Biceps      Triceps      Wrist Ext      Finger Flex    Finger Abduction   RIGHT             5/5             5/5             5/5             5/5                 5/5                     5/5  LEFT                5/5             5/5             5/5             5/5                 5/5                     5/5                          Hip Flex       Knee Ext        Knee Flex     Dorsiflex      Hallux Ext        PlantarFlex  RIGHT            5/5                5/5                 5/5               5/5                 5/5                    5/5  LEFT               5/5                5/5                 5/5               5/5                 5/5                    5/5      Sensory:                      C5      C6      C7      C8       T1          RIGHT          2         2        2         2         2          (0=absent, 1=impaired, 2=normal, NT=not testable)  LEFT             2         2        2         2         2          (0=absent, 1=impaired, 2=normal, NT=not testable)                        L2        L3       L4      L5       S1          RIGHT        2          2         2        2        2           (0=absent, 1=impaired, 2=normal, NT=not testable)  LEFT           2          2         2        2        2           (0=absent, 1=impaired, 2=normal, NT=not testable)    Negative Marsh's sign bilaterally  Negative Babinski bilaterally   Negative Myoclonus bilaterally        ASSESSMENT:  66F with unstable 3 column injury to the spine injury associated with VCF at T12.     PLAN:  - Requires surgical fixation for stabilization of clinically unstable spine.   - Patient/family requesting transfer to tertiary care center: Accepted by [facility].   - Pain control.   - Continue to hold DVT Ppx in anticipation of surgical fixation; May require dose before transfer.   - STRICT BEDREST/SPINE PRECAUTIONS.   - Will discuss with Dr. Beach and advise of any changes to the above plan.   
Patient seen and examined at bedside this AM. No acute complaints at this time. Pain well controlled at present. Denies weakness, numbness or tingling. Denies chest pain, shortness of breath, nausea or vomiting. Denies loss of bowel/bladder function, denies saddle anesthesia.     VITAL SIGNS:  T(C): 36.6 (10-25-21 @ 05:07), Max: 37.2 (10-24-21 @ 08:08)  HR: 81 (10-25-21 @ 05:07) (81 - 101)  BP: 119/66 (10-25-21 @ 05:07) (108/73 - 133/67)  RR: 16 (10-25-21 @ 05:07) (16 - 18)  SpO2: 98% (10-25-21 @ 05:07) (96% - 98%)    LABS:                        11.9   7.57  )-----------( 403      ( 23 Oct 2021 11:42 )             38.4     10-23    144  |  109<H>  |  17  ----------------------------<  92  5.1   |  30  |  0.73    Ca    8.7      23 Oct 2021 11:42    TPro  7.4  /  Alb  3.3  /  TBili  0.3  /  DBili  x   /  AST  22  /  ALT  20  /  AlkPhos  121<H>  10-23      Urinalysis Basic - ( 23 Oct 2021 16:16 )    Color: Yellow / Appearance: Clear / S.010 / pH: x  Gluc: x / Ketone: Negative  / Bili: Negative / Urobili: Negative   Blood: x / Protein: Negative / Nitrite: Negative   Leuk Esterase: Trace / RBC: 3-5 /HPF / WBC 11-25   Sq Epi: x / Non Sq Epi: Few / Bacteria: Few        PE:  General: NAD, resting comfortably in bed  Skin intact  TTP over left paraspinal and midline region in the lower thoracic region, midline and right paraspinal regions in the LSp  calves nontender  compartments soft and compressible  2+ radial pulses  2+ DP Pulses  negative Marsh's  neg Babinski  negative Myoclonus    Motor:                   C5                C6              C7               C8           T1   R             5/5                5/5            5/5              5/5          5/5  L             5/5                5/5            5/5              5/5          5/5                    L2                  L3             L4              L5            S1  R            5/5                5/5             5/5            5/5          5/5  L             5/5                5/5            5/5            5/5          5/5    Sensory:            C5         C6         C7      C8       T1        (0=absent, 1=impaired, 2=normal, NT=not testable)  R         2            2           2        2         2  L          2            2           2        2         2               L2          L3         L4      L5       S1         (0=absent, 1=impaired, 2=normal, NT=not testable)  R         2            2            2        2        2  L          2            2           2        2         2      A/P:  66y f w/ Nondisplaced subacute left L1/L2 TP fx, age indeterminant VCF T12, hypertrophic ligamentum flavum, minimal left lat subluxation T11-12, break in ALL ossification    -Case discussed with Dr. Beach  -Imaging with findings suspicious for unstable spine, MR recommended  -Rec admission for MR TSp/LSp  -Plan for MR TSp/LSp this AM  -Rec bedrest 2/2 suspected unstable spine  -Pain Control prn  -SCDs OK b/l  -Further recs pending imaging  -recommend workup for ankylosis spondylitis  -Will follow up HLA B27  -Medical management appreciated  -All of the patient's/family's concerns/questions answered
  Neurology Follow up note    BRIANNE [id number]    HPI:  Chart and labs  reviewed.  BRIANNE BARNES is a 67 YO Female  presented to ED for a severe back pain. Patient states that trip and fall 3 weeks ago landing on back.  Patient was taking flexeril since then with minimal relief and now feels pain worsening. Pain is in lower and mid back and radiates around her stomach. Denies nausea, vomiting, diarrhea, fever or chills.  She has no numbness or tingling but she feels weak. No saddle anesthesia or difficult with urination/ BM. Patient taking ibuprofen last dose 1030 am.   Patient is vaccinated for COVID-19 Moderna last dose March.  Patient with PMH breast CA (now in remission), benign brain tumor s/p resection, skin CA, seizure (23 Oct 2021 17:35)      Interval History -no new events    Patient is seen, chart was reviewed and case was discussed with the treatment team.  Pt is not in any distress.   Lying on bed comfortably.   No events reported overnight.   .    Vital Signs Last 24 Hrs  T(C): 36.9 (26 Oct 2021 11:47), Max: 36.9 (25 Oct 2021 21:25)  T(F): 98.4 (26 Oct 2021 11:47), Max: 98.4 (25 Oct 2021 21:25)  HR: 89 (26 Oct 2021 11:47) (85 - 93)  BP: 121/74 (26 Oct 2021 11:47) (101/66 - 121/74)  BP(mean): --  RR: 17 (26 Oct 2021 11:47) (16 - 17)  SpO2: 96% (26 Oct 2021 11:47) (96% - 97%)        REVIEW OF SYSTEMS:    Constitutional: No fever,   Eyes: No eye pain, visual disturbances, or discharge  ENT:  No difficulty hearing, tinnitus, vertigo; No sinus or throat pain  Neck: No pain or stiffness  Respiratory: No cough, wheezing, chills or hemoptysis  Cardiovascular: No chest pain, palpitations, shortness of breath, dizziness or leg swelling  Gastrointestinal: No abdominal or epigastric pain. No nausea, vomiting or hematemesis;  Genitourinary: No dysuria, frequency, hematuria or incontinence  Neurological: No headaches,  Psychiatric: No depression, anxiety, mood swings or difficulty sleeping  Skin: No itching, burning, rashes or lesions   Lymph Nodes: No enlarged glands  Endocrine: No heat or cold intolerance; No hair loss,  Allergy and Immunologic: No hives or eczema    On Neurological Examination:    Mental Status - Pt is alert, awake, oriented X3.. Follows commands well and able to answer questions appropriately  .Mood and affect  normal    Speech -  Normal.     Cranial Nerves - Pupils 3 mm equal and reactive to light, extraocular eye movements intact. Pt has no visual field deficit.  Pt has no facial asymmetry. Facial sensation is intact.Tongue - is in midline.    Muscle tone - is marcio    Motor Exam - 5/5 of UE  LE 4+/5   No drift. No shaking or tremors.    Sensory Exam -. Pt withdraws all extremities equally on stimulation. No asymmetry seen. No complaints of tingling, numbness.    coordination:    Finger to nose: normal      Deep tendon Reflexes - 2 plus all over.      Neck Supple -  Yes.     MEDICATIONS    acetaminophen     Tablet .. 650 milliGRAM(s) Oral every 4 hours PRN  ALPRAZolam 0.25 milliGRAM(s) Oral two times a day PRN  carBAMazepine 200 milliGRAM(s) Oral two times a day  diVALproex  milliGRAM(s) Oral <User Schedule>  enoxaparin Injectable 40 milliGRAM(s) SubCutaneous at bedtime  lidocaine   4% Patch 1 Patch Transdermal every 24 hours        methocarbamol 1500 milliGRAM(s) Oral three times a day PRN  morphine  - Injectable 2 milliGRAM(s) IV Push every 6 hours PRN  oxyCODONE    IR 5 milliGRAM(s) Oral every 4 hours PRN  oxyCODONE    IR 10 milliGRAM(s) Oral every 4 hours PRN  pantoprazole    Tablet 40 milliGRAM(s) Oral before breakfast      Allergies    Cipro (Unknown)  vancomycin (Other)    Intolerances      10-26    140  |  106  |  12  ----------------------------<  83  4.3   |  27  |  0.46<L>    Ca    8.8      26 Oct 2021 08:12        Hemoglobin A1C:     Vitamin B12     RADIOLOGY  < from: CT Lumbar Spine No Cont (10.23.21 @ 12:30) >    EXAM:  CT LUMBAR SPINE                          EXAM:  CT THORACIC SPINE                            PROCEDURE DATE:  10/23/2021          INTERPRETATION:  CT THORACIC SPINE, CT LUMBAR SPINE    History: Mid-back pain. Status post fall, history of breast cancer. Low back pain, trauma. Additional history per EMR: "67 y/o F with PMH breast CA (now in remission),  benign brain tumor s/p resection, skin CA, seizure presents to ED for c/o back pain. Pt states trip and fall 3 weeks ago landing on back. Was taking flexeril since then with minimal relief and now feels pain worsening. Pain is in lower and mid back and radiates around her stomach".    Comparison: No prior CT thoracic lumbar spine is available for comparison. Chest radiograph 2/11/2019.    Technique: Contiguous axial CT images of the thoracic and lumbar spine are obtained without the administration of intravenous contrast. Coronal and sagittal reformatted images were obtained.    FINDINGS:    The lower cervical spine is partially andsuboptimally imaged    THORACIC SPINE:  Alignment, vertebral bodies and intervertebral disc space:  There is an exaggerated kyphosis. Thin ossification of the anterior longitudinal ligament is noted. Slight left lateral subluxation of the T11 on T12 vertebral body is present, with corresponding lack of ossification of the anterior longitudinal ligament (image 46, series 601). This is favored to be degenerative in nature, given the presence of vacuum disc phenomena at T11-T12 intervertebral disc space and lack of pars defects.  Vertebral body heights are grossly preserved. However, small depression of the superior and inferior T12 endplates with adjacent sclerosis, likely reflects a degenerative Schmorl's nodes.  Multilevel disc desiccation/calcification and loss of intervertebral disc space heights.  No definite acute traumatic fracture or subluxation is identified within the thoracic spine.    Marrow: No aggressive osseous lesion is identified. Subtle lucencies within the vertebral bodies likely reflect degenerative change or demineralization.    Spinal Canal: No high-grade spinal canal stenosis is identified. However, ligamentum flavum ossification which encroaches within the spinal canal is noted at multiple levels within the mid thoracic spine.  Neural foramina: No neuroforaminal narrowing identified. Multilevel facet arthrosis is noted.    Prevertebral and Intradural/extradural space: No prevertebral soft tissue swelling/edema or significant epidural fluid collection appreciated, within limitations of noncontrast CT evaluation.    Paraspinal soft tissues: Unremarkable.  Lung/chest wall: Respiratory motion limits evaluation of the lungs. Dependent posterior atelectasis is noted.    LUMBAR SPINE:  Alignment:  There is a slightly exaggerated lumbar lordosis. Slight dextro convex curvature of the thoracic lumbar spine centered at L1-L2.. Minimal grade 1 retrolisthesis of L2 on L3, likely degenerative in nature, given lack of pars interarticularis defects. Lateral subluxation at T12-L1, as discussed detailed above.    Vertebrae, intervertebral disc and neural foramen of:  Vertebral body heights are grossly preserved, with minimal degenerative irregularity and small Schmorl's node at L3 and L4 superior endplates.  A nondisplaced cortical buckle fracture of the left L1 transverse process (image 209, series 3). Likely nondisplaced cortical buckle fracture at left L2 transverse process (image 77, series 3).  Qualitative osteopenia slightly limits evaluation for fractures. Subtle lucent foci throughout the vertebral bodies, likely degenerative or demineralization.  Following identified involving the lumbar spinous processes noted.    Spinal Canal: No high-grade spinal canal stenosis is identified. However, ligamentum flavum ossification and marked facet hypertrophy encroaches within the spinal canal is noted at multiple levels, resulting in mild multilevel stenosis (at L2-L3 and L3-L4 for example).  Neural foramina: No high-grade neuroforaminal narrowing is identified. Varying degrees of mild multilevel neuroforaminal narrowing, due to marked facet hypertrophy/arthrosis    Prevertebral and Intradural/extradural space: No prevertebral soft tissue swelling/edema or significant epidural fluid collection appreciated, within limitations of noncontrast CT evaluation.    Paraspinal soft tissues: Unremarkable.  Retroperitoneal soft tissues: Unremarkable limited noncontrast evaluation of the retroperitoneum.    The partially visualized sacrum and sacroiliac joints appear grossly intact.    IMPRESSION:    THORACIC SPINE:  1.  No acute fracture within the thoracic spine, within limitations of osteopenia.  2.  Slight left lateral subluxation of T11 on T12, likely degenerative in nature given vacuum disc phenomenon at T11-T12 and break in anterior longitudinal ligament ossification. Acute traumatic ligamentous injury in this region is less likely, but correlation with point tenderness and consider further evaluation with MRI as clinically warranted.  3.  Multilevel degenerative changes, including small Schmorl's nodes at superior inferior T12 endplates.    LUMBAR SPINE:  1.  Nondisplaced cortical buckle fractures at left L1 transverse process, greater than left L2 transverse process, age indeterminate butpossibly acute to subacute. Correlation with point tenderness in this region is recommended. No adjacent soft tissue contusion/edema identified.  2.  Consider further evaluation with MRI of this high clinical suspicion for acute traumatic fracture orligamentous injury.  3.  Subtle lucent foci throughout the thoracic and lumbar vertebra, likely degenerative/demineralization change. Further evaluation with contrast enhanced MRI can be considered if there is clinical suspicion for osseous metastasis given history of breast cancer.  4.  Multilevel degenerative changes, as detailed above.      Findings were discussed with Carolyn Valerio NP via telephone on 10/23/2021 at 1:33 PM with verbal read selina      RUTHIE MORE MD; Attending Radiologist  This document has been electronically signed. Oct 23 2021  1:34PM    < from: MR Thoracic Spine No Cont (10.25.21 @ 14:28) >    EXAM:  MR SPINE LUMBAR                          EXAM:  MR SPINE THORACIC                            PROCEDURE DATE:  10/25/2021          INTERPRETATION:  MR THORACIC SPINE, MR LUMBAR SPINE    History: Pain. r/o Trauma. Rule out ligamentous injury, status post MF, possible undiagnosed AS. Additional history per EMR: "66y f w/ Nondisplaced subacute left L1/L2 TP fx, age indeterminant VCF T12, hypertrophic ligamentum flavum, minimal left lat subluxation T11-12, break in ALL ossification    Comparison: CT thoracic lumbar spine 10/23/2021    Technique: Multiplanar, multisequence magnetic resonance imaging of the thoracic and lumbar spine regions was performed without contrast.    FINDINGS:    FINDINGS:    The lower cervical spine is partially and suboptimally imaged    THORACIC SPINE:    Alignment, vertebral bodies, intervertebral disc space and marrow:  An exaggerated kyphosis is redemonstrated. Thin ossification of the anterior longitudinal ligament better appreciated on prior CT.  The anterior longitudinal ligament is disrupted at T11-T12, corresponding to the break in ligament ossification seen on recent CT.  Additionally, there is associated widening of the anterior T11-T12 disc space, which demonstrate abnormal intense fluid signalat its anterior two thirds and posterior one third portion (image 8, series 5). There is minimal retrolisthesis of T11 on T12, with lateral subluxation seen on CT. However, the posterior longitudinal ligament appears grossly intact, with questionableminimal fluid signal (image 8, series 5 for example).  Mild compression deformity of the anterior superior T12 endplate (25% height loss), with corresponding marrow edema, suggesting acuity. Small Schmorl's node at inferior T12 endplate.   Small additional marrow edema within the adjacent posterior inferior T11 vertebral body, may reflect bone contusion given no definite corresponding fracture line on CT.  Edema within the T11-T12 intraspinous space and adjacent spinous processes and paraspinal soft tissues, suggesting a spinous ligamentous sprain/injury, with associated T11-T12 spinous process contusions and paraspinal contusion (image 6, series 5).    Slight diffuse T1 hypointense marrow signal, nonspecific.    Spinal Canal: No significant spinal canal stenosis is identified within the thoracic spine and at the level of the described injury at T11-T12.    Neural foramina: No neuroforaminal narrowing identified. Multilevel facet arthrosis and ligamentum flavum ossification noted.    Prevertebral and Intradural/extradural space: Mild prevertebral edema/hemorrhage is noted at the level of T11-T12 injury (images 5-11, series 5). No significant epidural fluid collection/hematoma is identified. Evaluation.    Lung/chest wall: Respiratory motion limits evaluation of the lungs. Dependent posterior atelectasis is noted.    LUMBAR SPINE:  Examination is slightly degraded due to patient motion.    Alignment:  There is a slightly exaggerated lumbar lordosis.  Minimal grade 1 retrolisthesis of L2 on L3, likely degenerative in nature, given lack of pars interarticularis defects.    Vertebrae, intervertebral disc and marrow:  Vertebral body heights are grossly preserved, with minimal degenerative irregularity and small Schmorl's node at inferior L1 and L2 endplates.  Previously described nondisplaced cortical buckle fracture of the left L1 and L2 transverse process, better appreciated on prior CT. No corresponding marrow edema, suggesting chronicity.    Slight diffuse heterogeneous T1 hypointense marrow signal, nonspecific.      Spinal Canal: No significant spinal canal stenosis is identified.    Neural foramina: No high-grade neuroforaminal narrowing is identified. Varying degrees of mild multilevel neuroforaminal narrowing, due to marked facet hypertrophy/arthrosis. Ligamentum flavum ossification and marked facet hypertrophy encroaches within the spinal canal is noted at multiple levels    Prevertebral and Intradural/extradural space: No prevertebral soft tissue swelling/edema or significant epidural fluid collection appreciated, within lumbar spine.    Paraspinal soft tissues: Mild STIR hyperintense signal within the bilateral paraspinal soft tissues, may reflect artifact versus mild nonspecific edema. Fatty atrophy of the lumbosacral paraspinal musculature.  Abdominal/Retroperitoneal soft tissues: Large 2.1 cm T2 hyperintense left renal lesion, suboptimally evaluated but likely renal cyst. Similar additional smaller renal lesions noted. Partially imaged gallbladder appears distended, with gallstones. The common bile duct appears slightly prominent (measures 9 mm).    The partially visualized sacrum and sacroiliac joints appear grossly intact.      IMPRESSION:    1.  Acute compression fracture of superior T12 vertebral body (25% height loss), with disruption of the ossified anterior longitudinal ligament, slight widening of anterior T11-T12 disc space and T11-T12 interspinous ligament edema/injury. Abnormal fluid signal within anterior and posterior T11-T12 disc, with questionable minimal edema within the posterior longitudinal ligament. Findings are consistent with unstable 3 column spinal injury, in the setting of possible ankylosing spondylitis.    2.  Bone contusions of T11-T12 spinous processes, withadjacent paraspinal soft tissue contusion. Additional small bone contusion at posterior inferior T11 vertebral body.    3.  Small prevertebral edema/hemorrhage at T11-T12. No significant epidural hematoma or spinal canal stenosis.    4.  Slight diffuse T1 hypointense marrow signal, nonspecific but may suggest anemia amongst multiple other etiologies such as lymphoproliferative disorder, smoking or obesity. Correlate clinically.        Findings were discussed with Dr. Jayesh Calderón (orthopedic surgery service) via telephone on 10/25/2021 at 7:27 PM with verbal read back.    --- End of Report ---            RUTHIE MORE MD; Attending Radiologist  This document has been electronically signed. Oct 25 2021  8:01PM    < end of copied text >    ASSESSMENT AND PLAN:      SEEN FOR BACK PAIN RELATE ACUTE LUMBAR/THORACIC  FRACTURE    SPINE SURGERY FOLLOW UP  PAIN CONTROL  Physical therapy evaluation.  OOB to chair/ambulation with assistance only.  Pain is accessed and addressed.  Would continue to follow.        
Patient seen and examined at bedside this AM. No acute complaints at this time. Pain well controlled at present. Denies weakness, numbness or tingling. Denies chest pain, shortness of breath, nausea or vomiting. Denies loss of bowel/bladder function, denies saddle anesthesia.     PE:  Vital Signs Last 24 Hrs  T(C): 37.2 (10-24-21 @ 08:08), Max: 37.2 (10-24-21 @ 08:08)  T(F): 99 (10-24-21 @ 08:08), Max: 99 (10-24-21 @ 08:08)  HR: 92 (10-24-21 @ 08:08) (89 - 101)  BP: 114/68 (10-24-21 @ 08:08) (108/73 - 153/77)  BP(mean): --  RR: 18 (10-24-21 @ 08:08) (18 - 18)  SpO2: 98% (10-24-21 @ 08:08) (98% - 98%)                          11.9   7.57  )-----------( 403      ( 23 Oct 2021 11:42 )             38.4     23 Oct 2021 11:42    144    |  109    |  17     ----------------------------<  92     5.1     |  30     |  0.73     Ca    8.7        23 Oct 2021 11:42    TPro  7.4    /  Alb  3.3    /  TBili  0.3    /  DBili  x      /  AST  22     /  ALT  20     /  AlkPhos  121    23 Oct 2021 11:42        General: NAD, resting comfortably in bed  2+ radial pulses  2+ DP Pulses    Motor:                   C5                C6              C7               C8           T1   R             5/5                5/5            5/5              5/5          5/5  L             5/5                5/5            5/5              5/5          5/5                    L2                  L3             L4              L5            S1  R            5/5                5/5             5/5            5/5          5/5  L             5/5                5/5            5/5            5/5          5/5    Sensory:            C5         C6         C7      C8       T1        (0=absent, 1=impaired, 2=normal, NT=not testable)  R         2            2           2        2         2  L          2            2           2        2         2               L2          L3         L4      L5       S1         (0=absent, 1=impaired, 2=normal, NT=not testable)  R         2            2            2        2        2  L          2            2           2        2         2      A/P:  66y f w/ Nondisplaced subacute left L1/L2 TP fx, age indeterminant VCF T12, hypertrophic ligamentum flavum, minimal left lat subluxation T11-12, break in ALL ossification    -Case discussed with Dr. Beach  -Imaging with findings suspicious for unstable spine, MR recommended  -Rec admission for MR TSp/LSp  -Rec bedrest 2/2 suspected unstable spine  -Pain Control prn  -SCDs OK b/l  -Further recs pending imaging  -recommend workup for ankylosis spondylitis  -Medical management appreciated  -all of the patient's/family's concerns/questions answered
    Neurology Follow up note    BRIANNE [id number]    HPI:  Chart and labs  reviewed.  BRIANNE BARNES is a 67 YO Female  presented to ED for a severe back pain. Patient states that trip and fall 3 weeks ago landing on back.  Patient was taking flexeril since then with minimal relief and now feels pain worsening. Pain is in lower and mid back and radiates around her stomach. Denies nausea, vomiting, diarrhea, fever or chills.  She has no numbness or tingling but she feels weak. No saddle anesthesia or difficult with urination/ BM. Patient taking ibuprofen last dose 1030 am.   Patient is vaccinated for COVID-19 Moderna last dose March.  Patient with PMH breast CA (now in remission), benign brain tumor s/p resection, skin CA, seizure (23 Oct 2021 17:35)      Interval History back pain 3/10    Patient is seen, chart was reviewed and case was discussed with the treatment team.  Pt is not in any distress.   Lying on bed comfortably.   No events reported overnight.   .    Vital Signs Last 24 Hrs  T(C): 36.8 (27 Oct 2021 04:39), Max: 36.8 (27 Oct 2021 04:39)  T(F): 98.3 (27 Oct 2021 04:39), Max: 98.3 (27 Oct 2021 04:39)  HR: 76 (27 Oct 2021 04:39) (76 - 88)  BP: 131/75 (27 Oct 2021 04:39) (123/67 - 131/75)  BP(mean): --  RR: 17 (27 Oct 2021 04:39) (17 - 17)  SpO2: 96% (27 Oct 2021 04:39) (96% - 96%)        REVIEW OF SYSTEMS:    Constitutional: No fever,   Eyes: No eye pain, visual disturbances, or discharge  ENT:  No difficulty hearing, tinnitus, vertigo; No sinus or throat pain  Neck: No pain or stiffness  Respiratory: No cough, wheezing, chills or hemoptysis  Cardiovascular: No chest pain, palpitations, shortness of breath, dizziness or leg swelling  Gastrointestinal: No abdominal or epigastric pain. No nausea, vomiting or hematemesis;  Genitourinary: No dysuria, frequency, hematuria or incontinence  Neurological: No headaches,  Psychiatric: No depression, anxiety, mood swings or difficulty sleeping  Skin: No itching, burning, rashes or lesions   Lymph Nodes: No enlarged glands  Endocrine: No heat or cold intolerance; No hair loss,  Allergy and Immunologic: No hives or eczema    On Neurological Examination:    Mental Status - Pt is alert, awake, oriented X3. Follows commands well and able to answer questions appropriately  .Mood and affect  normal    Speech -  Normal.     Cranial Nerves - Pupils 3 mm equal and reactive to light, extraocular eye movements intact. Pt has no visual field deficit.  Pt has no facial asymmetry. Facial sensation is intact.Tongue - is in midline.    Muscle tone - is marcio    Motor Exam - 5/5 of UE  LE 4+/5   No drift. No shaking or tremors.    Sensory Exam -. Pt withdraws all extremities equally on stimulation. No asymmetry seen. No complaints of tingling, numbness.    coordination:    Finger to nose: normal      Deep tendon Reflexes - 2 plus all over.      Neck Supple -  Yes.     MEDICATIONS    acetaminophen     Tablet .. 650 milliGRAM(s) Oral every 4 hours PRN  ALPRAZolam 0.25 milliGRAM(s) Oral two times a day PRN  carBAMazepine 200 milliGRAM(s) Oral two times a day  diVALproex  milliGRAM(s) Oral <User Schedule>  enoxaparin Injectable 40 milliGRAM(s) SubCutaneous at bedtime  lidocaine   4% Patch 1 Patch Transdermal every 24 hours        methocarbamol 1500 milliGRAM(s) Oral three times a day PRN  morphine  - Injectable 2 milliGRAM(s) IV Push every 6 hours PRN  oxyCODONE    IR 5 milliGRAM(s) Oral every 4 hours PRN  oxyCODONE    IR 10 milliGRAM(s) Oral every 4 hours PRN  pantoprazole    Tablet 40 milliGRAM(s) Oral before breakfast      Allergies    Cipro (Unknown)  vancomycin (Other)    Intolerances                            11.3   7.78  )-----------( 421      ( 26 Oct 2021 08:12 )             36.2   10-26    140  |  106  |  12  ----------------------------<  83  4.3   |  27  |  0.46<L>    Ca    8.8      26 Oct 2021 08:12        Hemoglobin A1C:     Vitamin B12     RADIOLOGY  < from: CT Lumbar Spine No Cont (10.23.21 @ 12:30) >    EXAM:  CT LUMBAR SPINE                          EXAM:  CT THORACIC SPINE                            PROCEDURE DATE:  10/23/2021          INTERPRETATION:  CT THORACIC SPINE, CT LUMBAR SPINE    History: Mid-back pain. Status post fall, history of breast cancer. Low back pain, trauma. Additional history per EMR: "67 y/o F with PMH breast CA (now in remission),  benign brain tumor s/p resection, skin CA, seizure presents to ED for c/o back pain. Pt states trip and fall 3 weeks ago landing on back. Was taking flexeril since then with minimal relief and now feels pain worsening. Pain is in lower and mid back and radiates around her stomach".    Comparison: No prior CT thoracic lumbar spine is available for comparison. Chest radiograph 2/11/2019.    Technique: Contiguous axial CT images of the thoracic and lumbar spine are obtained without the administration of intravenous contrast. Coronal and sagittal reformatted images were obtained.    FINDINGS:    The lower cervical spine is partially andsuboptimally imaged    THORACIC SPINE:  Alignment, vertebral bodies and intervertebral disc space:  There is an exaggerated kyphosis. Thin ossification of the anterior longitudinal ligament is noted. Slight left lateral subluxation of the T11 on T12 vertebral body is present, with corresponding lack of ossification of the anterior longitudinal ligament (image 46, series 601). This is favored to be degenerative in nature, given the presence of vacuum disc phenomena at T11-T12 intervertebral disc space and lack of pars defects.  Vertebral body heights are grossly preserved. However, small depression of the superior and inferior T12 endplates with adjacent sclerosis, likely reflects a degenerative Schmorl's nodes.  Multilevel disc desiccation/calcification and loss of intervertebral disc space heights.  No definite acute traumatic fracture or subluxation is identified within the thoracic spine.    Marrow: No aggressive osseous lesion is identified. Subtle lucencies within the vertebral bodies likely reflect degenerative change or demineralization.    Spinal Canal: No high-grade spinal canal stenosis is identified. However, ligamentum flavum ossification which encroaches within the spinal canal is noted at multiple levels within the mid thoracic spine.  Neural foramina: No neuroforaminal narrowing identified. Multilevel facet arthrosis is noted.    Prevertebral and Intradural/extradural space: No prevertebral soft tissue swelling/edema or significant epidural fluid collection appreciated, within limitations of noncontrast CT evaluation.    Paraspinal soft tissues: Unremarkable.  Lung/chest wall: Respiratory motion limits evaluation of the lungs. Dependent posterior atelectasis is noted.    LUMBAR SPINE:  Alignment:  There is a slightly exaggerated lumbar lordosis. Slight dextro convex curvature of the thoracic lumbar spine centered at L1-L2.. Minimal grade 1 retrolisthesis of L2 on L3, likely degenerative in nature, given lack of pars interarticularis defects. Lateral subluxation at T12-L1, as discussed detailed above.    Vertebrae, intervertebral disc and neural foramen of:  Vertebral body heights are grossly preserved, with minimal degenerative irregularity and small Schmorl's node at L3 and L4 superior endplates.  A nondisplaced cortical buckle fracture of the left L1 transverse process (image 209, series 3). Likely nondisplaced cortical buckle fracture at left L2 transverse process (image 77, series 3).  Qualitative osteopenia slightly limits evaluation for fractures. Subtle lucent foci throughout the vertebral bodies, likely degenerative or demineralization.  Following identified involving the lumbar spinous processes noted.    Spinal Canal: No high-grade spinal canal stenosis is identified. However, ligamentum flavum ossification and marked facet hypertrophy encroaches within the spinal canal is noted at multiple levels, resulting in mild multilevel stenosis (at L2-L3 and L3-L4 for example).  Neural foramina: No high-grade neuroforaminal narrowing is identified. Varying degrees of mild multilevel neuroforaminal narrowing, due to marked facet hypertrophy/arthrosis    Prevertebral and Intradural/extradural space: No prevertebral soft tissue swelling/edema or significant epidural fluid collection appreciated, within limitations of noncontrast CT evaluation.    Paraspinal soft tissues: Unremarkable.  Retroperitoneal soft tissues: Unremarkable limited noncontrast evaluation of the retroperitoneum.    The partially visualized sacrum and sacroiliac joints appear grossly intact.    IMPRESSION:    THORACIC SPINE:  1.  No acute fracture within the thoracic spine, within limitations of osteopenia.  2.  Slight left lateral subluxation of T11 on T12, likely degenerative in nature given vacuum disc phenomenon at T11-T12 and break in anterior longitudinal ligament ossification. Acute traumatic ligamentous injury in this region is less likely, but correlation with point tenderness and consider further evaluation with MRI as clinically warranted.  3.  Multilevel degenerative changes, including small Schmorl's nodes at superior inferior T12 endplates.    LUMBAR SPINE:  1.  Nondisplaced cortical buckle fractures at left L1 transverse process, greater than left L2 transverse process, age indeterminate butpossibly acute to subacute. Correlation with point tenderness in this region is recommended. No adjacent soft tissue contusion/edema identified.  2.  Consider further evaluation with MRI of this high clinical suspicion for acute traumatic fracture orligamentous injury.  3.  Subtle lucent foci throughout the thoracic and lumbar vertebra, likely degenerative/demineralization change. Further evaluation with contrast enhanced MRI can be considered if there is clinical suspicion for osseous metastasis given history of breast cancer.  4.  Multilevel degenerative changes, as detailed above.      Findings were discussed with Carolyn Valerio NP via telephone on 10/23/2021 at 1:33 PM with verbal read selina      RUTHIE MORE MD; Attending Radiologist  This document has been electronically signed. Oct 23 2021  1:34PM    < from: MR Thoracic Spine No Cont (10.25.21 @ 14:28) >    EXAM:  MR SPINE LUMBAR                          EXAM:  MR SPINE THORACIC                            PROCEDURE DATE:  10/25/2021          INTERPRETATION:  MR THORACIC SPINE, MR LUMBAR SPINE    History: Pain. r/o Trauma. Rule out ligamentous injury, status post MF, possible undiagnosed AS. Additional history per EMR: "66y f w/ Nondisplaced subacute left L1/L2 TP fx, age indeterminant VCF T12, hypertrophic ligamentum flavum, minimal left lat subluxation T11-12, break in ALL ossification    Comparison: CT thoracic lumbar spine 10/23/2021    Technique: Multiplanar, multisequence magnetic resonance imaging of the thoracic and lumbar spine regions was performed without contrast.    FINDINGS:    FINDINGS:    The lower cervical spine is partially and suboptimally imaged    THORACIC SPINE:    Alignment, vertebral bodies, intervertebral disc space and marrow:  An exaggerated kyphosis is redemonstrated. Thin ossification of the anterior longitudinal ligament better appreciated on prior CT.  The anterior longitudinal ligament is disrupted at T11-T12, corresponding to the break in ligament ossification seen on recent CT.  Additionally, there is associated widening of the anterior T11-T12 disc space, which demonstrate abnormal intense fluid signalat its anterior two thirds and posterior one third portion (image 8, series 5). There is minimal retrolisthesis of T11 on T12, with lateral subluxation seen on CT. However, the posterior longitudinal ligament appears grossly intact, with questionableminimal fluid signal (image 8, series 5 for example).  Mild compression deformity of the anterior superior T12 endplate (25% height loss), with corresponding marrow edema, suggesting acuity. Small Schmorl's node at inferior T12 endplate.   Small additional marrow edema within the adjacent posterior inferior T11 vertebral body, may reflect bone contusion given no definite corresponding fracture line on CT.  Edema within the T11-T12 intraspinous space and adjacent spinous processes and paraspinal soft tissues, suggesting a spinous ligamentous sprain/injury, with associated T11-T12 spinous process contusions and paraspinal contusion (image 6, series 5).    Slight diffuse T1 hypointense marrow signal, nonspecific.    Spinal Canal: No significant spinal canal stenosis is identified within the thoracic spine and at the level of the described injury at T11-T12.    Neural foramina: No neuroforaminal narrowing identified. Multilevel facet arthrosis and ligamentum flavum ossification noted.    Prevertebral and Intradural/extradural space: Mild prevertebral edema/hemorrhage is noted at the level of T11-T12 injury (images 5-11, series 5). No significant epidural fluid collection/hematoma is identified. Evaluation.    Lung/chest wall: Respiratory motion limits evaluation of the lungs. Dependent posterior atelectasis is noted.    LUMBAR SPINE:  Examination is slightly degraded due to patient motion.    Alignment:  There is a slightly exaggerated lumbar lordosis.  Minimal grade 1 retrolisthesis of L2 on L3, likely degenerative in nature, given lack of pars interarticularis defects.    Vertebrae, intervertebral disc and marrow:  Vertebral body heights are grossly preserved, with minimal degenerative irregularity and small Schmorl's node at inferior L1 and L2 endplates.  Previously described nondisplaced cortical buckle fracture of the left L1 and L2 transverse process, better appreciated on prior CT. No corresponding marrow edema, suggesting chronicity.    Slight diffuse heterogeneous T1 hypointense marrow signal, nonspecific.      Spinal Canal: No significant spinal canal stenosis is identified.    Neural foramina: No high-grade neuroforaminal narrowing is identified. Varying degrees of mild multilevel neuroforaminal narrowing, due to marked facet hypertrophy/arthrosis. Ligamentum flavum ossification and marked facet hypertrophy encroaches within the spinal canal is noted at multiple levels    Prevertebral and Intradural/extradural space: No prevertebral soft tissue swelling/edema or significant epidural fluid collection appreciated, within lumbar spine.    Paraspinal soft tissues: Mild STIR hyperintense signal within the bilateral paraspinal soft tissues, may reflect artifact versus mild nonspecific edema. Fatty atrophy of the lumbosacral paraspinal musculature.  Abdominal/Retroperitoneal soft tissues: Large 2.1 cm T2 hyperintense left renal lesion, suboptimally evaluated but likely renal cyst. Similar additional smaller renal lesions noted. Partially imaged gallbladder appears distended, with gallstones. The common bile duct appears slightly prominent (measures 9 mm).    The partially visualized sacrum and sacroiliac joints appear grossly intact.      IMPRESSION:    1.  Acute compression fracture of superior T12 vertebral body (25% height loss), with disruption of the ossified anterior longitudinal ligament, slight widening of anterior T11-T12 disc space and T11-T12 interspinous ligament edema/injury. Abnormal fluid signal within anterior and posterior T11-T12 disc, with questionable minimal edema within the posterior longitudinal ligament. Findings are consistent with unstable 3 column spinal injury, in the setting of possible ankylosing spondylitis.    2.  Bone contusions of T11-T12 spinous processes, withadjacent paraspinal soft tissue contusion. Additional small bone contusion at posterior inferior T11 vertebral body.    3.  Small prevertebral edema/hemorrhage at T11-T12. No significant epidural hematoma or spinal canal stenosis.    4.  Slight diffuse T1 hypointense marrow signal, nonspecific but may suggest anemia amongst multiple other etiologies such as lymphoproliferative disorder, smoking or obesity. Correlate clinically.        Findings were discussed with Dr. Jayesh Calderón (orthopedic surgery service) via telephone on 10/25/2021 at 7:27 PM with verbal read back.    --- End of Report ---            RUTHIE MORE MD; Attending Radiologist  This document has been electronically signed. Oct 25 2021  8:01PM    < end of copied text >    ASSESSMENT AND PLAN:      SEEN FOR BACK PAIN RELATE ACUTE LUMBAR/THORACIC  FRACTURE    FOR TRANSFER [facility] FOR SURGICAL INTERVENTION  PAIN CONTROL  Physical therapy evaluation.  OOB to chair/ambulation with assistance only.  Pain is accessed and addressed.  Would continue to follow.            
Neurology Follow up note    BRIANNE [id number]    HPI:  Chart and labs  reviewed.  BRIANNE BARNES is a 67 YO Female  presented to ED for a severe back pain. Patient states that trip and fall 3 weeks ago landing on back.  Patient was taking flexeril since then with minimal relief and now feels pain worsening. Pain is in lower and mid back and radiates around her stomach. Denies nausea, vomiting, diarrhea, fever or chills.  She has no numbness or tingling but she feels weak. No saddle anesthesia or difficult with urination/ BM. Patient taking ibuprofen last dose 1030 am.   Patient is vaccinated for COVID-19 Moderna last dose March.  Patient with PMH breast CA (now in remission), benign brain tumor s/p resection, skin CA, seizure (23 Oct 2021 17:35)      Interval History -still having back pain    Patient is seen, chart was reviewed and case was discussed with the treatment team.  Pt is not in any distress.   Lying on bed comfortably.   No events reported overnight.   .    Vital Signs Last 24 Hrs  T(C): 36.9 (25 Oct 2021 12:08), Max: 36.9 (24 Oct 2021 20:07)  T(F): 98.4 (25 Oct 2021 12:08), Max: 98.5 (24 Oct 2021 20:07)  HR: 87 (25 Oct 2021 12:08) (81 - 98)  BP: 119/68 (25 Oct 2021 12:08) (112/71 - 133/67)  BP(mean): --  RR: 14 (25 Oct 2021 12:08) (14 - 18)  SpO2: 95% (25 Oct 2021 12:08) (95% - 98%)        REVIEW OF SYSTEMS:    Constitutional: No fever,   Eyes: No eye pain, visual disturbances, or discharge  ENT:  No difficulty hearing, tinnitus, vertigo; No sinus or throat pain  Neck: No pain or stiffness  Respiratory: No cough, wheezing, chills or hemoptysis  Cardiovascular: No chest pain, palpitations, shortness of breath, dizziness or leg swelling  Gastrointestinal: No abdominal or epigastric pain. No nausea, vomiting or hematemesis;  Genitourinary: No dysuria, frequency, hematuria or incontinence  Neurological: No headaches,  Psychiatric: No depression, anxiety, mood swings or difficulty sleeping  Skin: No itching, burning, rashes or lesions   Lymph Nodes: No enlarged glands  Endocrine: No heat or cold intolerance; No hair loss,  Allergy and Immunologic: No hives or eczema    On Neurological Examination:    Mental Status - Pt is alert, awake, oriented X3.. Follows commands well and able to answer questions appropriately  .Mood and affect  normal    Speech -  Normal.     Cranial Nerves - Pupils 3 mm equal and reactive to light, extraocular eye movements intact. Pt has no visual field deficit.  Pt has no facial asymmetry. Facial sensation is intact.Tongue - is in midline.    Muscle tone - is marcio    Motor Exam - 5/5 of UE  LE 4+/5   No drift. No shaking or tremors.    Sensory Exam -. Pt withdraws all extremities equally on stimulation. No asymmetry seen. No complaints of tingling, numbness.    coordination:    Finger to nose: normal      Deep tendon Reflexes - 2 plus all over.      Neck Supple -  Yes.     MEDICATIONS    acetaminophen     Tablet .. 650 milliGRAM(s) Oral every 4 hours PRN  ALPRAZolam 0.25 milliGRAM(s) Oral two times a day PRN  carBAMazepine 200 milliGRAM(s) Oral two times a day  diVALproex  milliGRAM(s) Oral <User Schedule>  enoxaparin Injectable 40 milliGRAM(s) SubCutaneous at bedtime  lidocaine   4% Patch 1 Patch Transdermal every 24 hours  methocarbamol 1500 milliGRAM(s) Oral three times a day PRN  morphine  - Injectable 2 milliGRAM(s) IV Push every 6 hours PRN  oxyCODONE    IR 5 milliGRAM(s) Oral every 4 hours PRN  oxyCODONE    IR 10 milliGRAM(s) Oral every 4 hours PRN  pantoprazole    Tablet 40 milliGRAM(s) Oral before breakfast      Allergies    Cipro (Unknown)  vancomycin (Other)    Intolerances        LABS:    Urinalysis Basic - ( 23 Oct 2021 16:16 )    Color: Yellow / Appearance: Clear / S.010 / pH: x  Gluc: x / Ketone: Negative  / Bili: Negative / Urobili: Negative   Blood: x / Protein: Negative / Nitrite: Negative   Leuk Esterase: Trace / RBC: 3-5 /HPF / WBC 11-25   Sq Epi: x / Non Sq Epi: Few / Bacteria: Few      10-25    140  |  106  |  13  ----------------------------<  71  4.0   |  26  |  0.49<L>    Ca    8.5      25 Oct 2021 08:48      Hemoglobin A1C:     Vitamin B12     RADIOLOGY  < from: CT Lumbar Spine No Cont (10.23.21 @ 12:30) >    EXAM:  CT LUMBAR SPINE                          EXAM:  CT THORACIC SPINE                            PROCEDURE DATE:  10/23/2021          INTERPRETATION:  CT THORACIC SPINE, CT LUMBAR SPINE    History: Mid-back pain. Status post fall, history of breast cancer. Low back pain, trauma. Additional history per EMR: "65 y/o F with PMH breast CA (now in remission),  benign brain tumor s/p resection, skin CA, seizure presents to ED for c/o back pain. Pt states trip and fall 3 weeks ago landing on back. Was taking flexeril since then with minimal relief and now feels pain worsening. Pain is in lower and mid back and radiates around her stomach".    Comparison: No prior CT thoracic lumbar spine is available for comparison. Chest radiograph 2019.    Technique: Contiguous axial CT images of the thoracic and lumbar spine are obtained without the administration of intravenous contrast. Coronal and sagittal reformatted images were obtained.    FINDINGS:    The lower cervical spine is partially andsuboptimally imaged    THORACIC SPINE:  Alignment, vertebral bodies and intervertebral disc space:  There is an exaggerated kyphosis. Thin ossification of the anterior longitudinal ligament is noted. Slight left lateral subluxation of the T11 on T12 vertebral body is present, with corresponding lack of ossification of the anterior longitudinal ligament (image 46, series 601). This is favored to be degenerative in nature, given the presence of vacuum disc phenomena at T11-T12 intervertebral disc space and lack of pars defects.  Vertebral body heights are grossly preserved. However, small depression of the superior and inferior T12 endplates with adjacent sclerosis, likely reflects a degenerative Schmorl's nodes.  Multilevel disc desiccation/calcification and loss of intervertebral disc space heights.  No definite acute traumatic fracture or subluxation is identified within the thoracic spine.    Marrow: No aggressive osseous lesion is identified. Subtle lucencies within the vertebral bodies likely reflect degenerative change or demineralization.    Spinal Canal: No high-grade spinal canal stenosis is identified. However, ligamentum flavum ossification which encroaches within the spinal canal is noted at multiple levels within the mid thoracic spine.  Neural foramina: No neuroforaminal narrowing identified. Multilevel facet arthrosis is noted.    Prevertebral and Intradural/extradural space: No prevertebral soft tissue swelling/edema or significant epidural fluid collection appreciated, within limitations of noncontrast CT evaluation.    Paraspinal soft tissues: Unremarkable.  Lung/chest wall: Respiratory motion limits evaluation of the lungs. Dependent posterior atelectasis is noted.    LUMBAR SPINE:  Alignment:  There is a slightly exaggerated lumbar lordosis. Slight dextro convex curvature of the thoracic lumbar spine centered at L1-L2.. Minimal grade 1 retrolisthesis of L2 on L3, likely degenerative in nature, given lack of pars interarticularis defects. Lateral subluxation at T12-L1, as discussed detailed above.    Vertebrae, intervertebral disc and neural foramen of:  Vertebral body heights are grossly preserved, with minimal degenerative irregularity and small Schmorl's node at L3 and L4 superior endplates.  A nondisplaced cortical buckle fracture of the left L1 transverse process (image 209, series 3). Likely nondisplaced cortical buckle fracture at left L2 transverse process (image 77, series 3).  Qualitative osteopenia slightly limits evaluation for fractures. Subtle lucent foci throughout the vertebral bodies, likely degenerative or demineralization.  Following identified involving the lumbar spinous processes noted.    Spinal Canal: No high-grade spinal canal stenosis is identified. However, ligamentum flavum ossification and marked facet hypertrophy encroaches within the spinal canal is noted at multiple levels, resulting in mild multilevel stenosis (at L2-L3 and L3-L4 for example).  Neural foramina: No high-grade neuroforaminal narrowing is identified. Varying degrees of mild multilevel neuroforaminal narrowing, due to marked facet hypertrophy/arthrosis    Prevertebral and Intradural/extradural space: No prevertebral soft tissue swelling/edema or significant epidural fluid collection appreciated, within limitations of noncontrast CT evaluation.    Paraspinal soft tissues: Unremarkable.  Retroperitoneal soft tissues: Unremarkable limited noncontrast evaluation of the retroperitoneum.    The partially visualized sacrum and sacroiliac joints appear grossly intact.    IMPRESSION:    THORACIC SPINE:  1.  No acute fracture within the thoracic spine, within limitations of osteopenia.  2.  Slight left lateral subluxation of T11 on T12, likely degenerative in nature given vacuum disc phenomenon at T11-T12 and break in anterior longitudinal ligament ossification. Acute traumatic ligamentous injury in this region is less likely, but correlation with point tenderness and consider further evaluation with MRI as clinically warranted.  3.  Multilevel degenerative changes, including small Schmorl's nodes at superior inferior T12 endplates.    LUMBAR SPINE:  1.  Nondisplaced cortical buckle fractures at left L1 transverse process, greater than left L2 transverse process, age indeterminate butpossibly acute to subacute. Correlation with point tenderness in this region is recommended. No adjacent soft tissue contusion/edema identified.  2.  Consider further evaluation with MRI of this high clinical suspicion for acute traumatic fracture orligamentous injury.  3.  Subtle lucent foci throughout the thoracic and lumbar vertebra, likely degenerative/demineralization change. Further evaluation with contrast enhanced MRI can be considered if there is clinical suspicion for osseous metastasis given history of breast cancer.  4.  Multilevel degenerative changes, as detailed above.      Findings were discussed with Carolyn Valerio NP via telephone on 10/23/2021 at 1:33 PM with verbal read back.    --- End of Report ---            RUTHIE MORE MD; Attending Radiologist  This document has been electronically signed. Oct 23 2021  1:34PM      ASSESSMENT AND PLAN:      SEEN FOR BACK PAIN RELATE ACUTE LUMBAR FRACTURE    PAIN CONTROL  FOR MRI OF L-S SPINE  Physical therapy evaluation.  OOB to chair/ambulation with assistance only.  Pain is accessed and addressed.  Would continue to follow.              
Orthopedics     Pt seen and examined at the bedside. Reporting pain in the thoracic/lumbar spine at this time. Pt is aware of plan and agrees, no concerns at this time.     Vital Signs Last 24 Hrs  T(C): 36.4 (10-26-21 @ 05:01), Max: 36.9 (10-25-21 @ 12:08)  T(F): 97.5 (10-26-21 @ 05:01), Max: 98.4 (10-25-21 @ 12:08)  HR: 93 (10-26-21 @ 05:01) (85 - 93)  BP: 101/66 (10-26-21 @ 05:01) (101/66 - 119/68)  BP(mean): --  RR: 17 (10-26-21 @ 05:01) (14 - 17)  SpO2: 96% (10-26-21 @ 05:01) (95% - 97%)    25 Oct 2021 08:48    140    |  106    |  13     ----------------------------<  71     4.0     |  26     |  0.49     Ca    8.5        25 Oct 2021 08:48      PHYSICAL EXAM  GEN: NAD, AAOx3    SPINE:  Skin intact  NTTP over the bony prominences of the cervical // thoracic // lumbar // sacral spine  No bony step-offs   Grossly moving all extremities  + Median/Radial/Ulnar/Musculocutaneous/Axillary nerves intact  + Hip Flexors/Quads/Hamstrings/TA/EHL/FHL/GS  SILT C5-T1  SILT L2-S1  + Radial Pulse  + DP/PT Pulses  No saddle anesthesia    Motor:                          Deltoid       Biceps      Triceps      Wrist Ext      Finger Flex    Finger Abduction   RIGHT             5/5             5/5             5/5             5/5                 5/5                     5/5  LEFT                5/5             5/5             5/5             5/5                 5/5                     5/5                          Hip Flex       Knee Ext        Knee Flex     Dorsiflex      Hallux Ext        PlantarFlex  RIGHT            5/5                5/5                 5/5               5/5                 5/5                    5/5  LEFT               5/5                5/5                 5/5               5/5                 5/5                    5/5      Sensory:                      C5      C6      C7      C8       T1          RIGHT          2         2        2         2         2          (0=absent, 1=impaired, 2=normal, NT=not testable)  LEFT             2         2        2         2         2          (0=absent, 1=impaired, 2=normal, NT=not testable)                        L2        L3       L4      L5       S1          RIGHT        2          2         2        2        2           (0=absent, 1=impaired, 2=normal, NT=not testable)  LEFT           2          2         2        2        2           (0=absent, 1=impaired, 2=normal, NT=not testable)    Negative Marsh's sign bilaterally  Negative Babinski bilaterally   Negative Myoclonus bilaterally        A/P:  66yF unstable 3 column injury to the spine injury associated with VCF at T12    -Patient will likely need surgical fixation for this unstable fx, Will discuss OR availability  - NPO after midnight   -Pain control prn  - Hold DVT ppx after midnight  - Bedrest/spine precautions  - FU am labs  - Please document medical clearance  
[facility] -- Eda Carnes, Joyce Astorga, Romero Pink Savella, Goodger: Office # [phone]    Follow Up:  pre/post cardiac optimization    Subjective/Observations: Patient seen and examined, awake, alert, resting in bed, no complaints of chest pain, dyspnea, palpitations or dizziness, orthopnea and PND. Tolerating room air.     REVIEW OF SYSTEMS: All review of systems is negative for eye, ENT, GI, , allergic, dermatologic, musculoskeletal and neurologic except as described above    PAST MEDICAL & SURGICAL HISTORY:  Seizures  Brain cancer  Breast cancer  Melanoma  History of hip replacement        MEDICATIONS  (STANDING):  carBAMazepine 200 milliGRAM(s) Oral two times a day  diVALproex  milliGRAM(s) Oral <User Schedule>  lidocaine   4% Patch 1 Patch Transdermal every 24 hours  pantoprazole    Tablet 40 milliGRAM(s) Oral before breakfast    MEDICATIONS  (PRN):  acetaminophen     Tablet .. 650 milliGRAM(s) Oral every 4 hours PRN Mild Pain (1 - 3)  ALPRAZolam 0.25 milliGRAM(s) Oral three times a day PRN anxiety  methocarbamol 1500 milliGRAM(s) Oral three times a day PRN Muscle Spasm  morphine  - Injectable 2 milliGRAM(s) IV Push every 6 hours PRN breakthrough pain  oxyCODONE    IR 5 milliGRAM(s) Oral every 4 hours PRN Moderate Pain (4 - 6)  oxyCODONE    IR 10 milliGRAM(s) Oral every 4 hours PRN Severe Pain (7 - 10)    Allergies    Cipro (Unknown)  vancomycin (Other)    Intolerances      Vital Signs Last 24 Hrs  T(C): 36.8 (27 Oct 2021 04:39), Max: 36.8 (27 Oct 2021 04:39)  T(F): 98.3 (27 Oct 2021 04:39), Max: 98.3 (27 Oct 2021 04:39)  HR: 76 (27 Oct 2021 04:39) (76 - 88)  BP: 131/75 (27 Oct 2021 04:39) (123/67 - 131/75)  RR: 17 (27 Oct 2021 04:39) (17 - 17)  SpO2: 96% (27 Oct 2021 04:39) (96% - 96%)  I&O's Summary        TELE: Not on telemetry   PHYSICAL EXAM:  Appearance: NAD, no distress, alert,  HEENT: Moist Mucous Membranes, Anicteric  Cardiovascular: Regular rate and rhythm, Normal S1 S2, No JVD, No murmurs, No rubs, gallops or clicks  Respiratory: Non-labored, Clear to auscultation, No rales, No rhonchi, No wheezing.   Gastrointestinal:  Soft, Non-tender, + BS  Neurologic: Non-focal  Skin: Warm and dry, No visible rashes or ulcers, No ecchymosis, No cyanosis  Musculoskeletal: No clubbing, No cyanosis, No joint swelling/tenderness  Psychiatry: Mood & affect appropriate  Lymph: No peripheral edema.     LABS: All Labs Reviewed:                        11.3   7.78  )-----------( 421      ( 26 Oct 2021 08:12 )             36.2     26 Oct 2021 08:12    140    |  106    |  12     ----------------------------<  83     4.3     |  27     |  0.46   25 Oct 2021 08:48    140    |  106    |  13     ----------------------------<  71     4.0     |  26     |  0.49     Ca    8.8        26 Oct 2021 08:12  Ca    8.5        25 Oct 2021 08:48      PT/INR - ( 26 Oct 2021 08:12 )   PT: 12.5 sec;   INR: 1.07 ratio         PTT - ( 26 Oct 2021 08:12 )  PTT:33.1 sec      12 Lead ECG:   Ventricular Rate 86 BPM  Atrial Rate 86 BPM  P-R Interval 206 ms  QRS Duration 94 ms  Q-T Interval 376 ms  QTC Calculation(Bazett) 449 ms  P Axis 68 degrees  R Axis -85 degrees  T Axis 28 degrees    Diagnosis Line Normal sinus rhythm  Left anterior fascicular block  Abnormal ECG  When compared with ECG of 11-FEB-2019 13:15,  No significant change was found  Confirmed by MICHELLE BOWDEN ([id number]) on 10/26/2021 3:07:30 PM (10-26-21 @ 11:38)    < from: MR Lumbar Spine No Cont (10.25.21 @ 14:31) >    EXAM:  MR SPINE LUMBAR                          EXAM:  MR SPINE THORACIC                            PROCEDURE DATE:  10/25/2021          INTERPRETATION:  MR THORACIC SPINE, MR LUMBAR SPINE    History: Pain. r/o Trauma. Rule out ligamentous injury, status post MF, possible undiagnosed AS. Additional history per EMR: "66y f w/ Nondisplaced subacute left L1/L2 TP fx, age indeterminant VCF T12, hypertrophic ligamentum flavum, minimal left lat subluxation T11-12, break in ALL ossification    Comparison: CT thoracic lumbar spine 10/23/2021    Technique: Multiplanar, multisequence magnetic resonance imaging of the thoracic and lumbar spine regions was performed without contrast.    FINDINGS:    FINDINGS:    The lower cervical spine is partially and suboptimally imaged    THORACIC SPINE:    Alignment, vertebral bodies, intervertebral disc space and marrow:  An exaggerated kyphosis is redemonstrated. Thin ossification of the anterior longitudinal ligament better appreciated on prior CT.  The anterior longitudinal ligament is disrupted at T11-T12, corresponding to the break in ligament ossification seen on recent CT.  Additionally, there is associated widening of the anterior T11-T12 disc space, which demonstrate abnormal intense fluid signalat its anterior two thirds and posterior one third portion (image 8, series 5). There is minimal retrolisthesis of T11 on T12, with lateral subluxation seen on CT. However, the posterior longitudinal ligament appears grossly intact, with questionableminimal fluid signal (image 8, series 5 for example).  Mild compression deformity of the anterior superior T12 endplate (25% height loss), with corresponding marrow edema, suggesting acuity. Small Schmorl's node at inferior T12 endplate.   Small additional marrow edema within the adjacent posterior inferior T11 vertebral body, may reflect bone contusion given no definite corresponding fracture line on CT.  Edema within the T11-T12 intraspinous space and adjacent spinous processes and paraspinal soft tissues, suggesting a spinous ligamentous sprain/injury, with associated T11-T12 spinous process contusions and paraspinal contusion (image 6, series 5).    Slight diffuse T1 hypointense marrow signal, nonspecific.    Spinal Canal: No significant spinal canal stenosis is identified within the thoracic spine and at the level of the described injury at T11-T12.    Neural foramina: No neuroforaminal narrowing identified. Multilevel facet arthrosis and ligamentum flavum ossification noted.    Prevertebral and Intradural/extradural space: Mild prevertebral edema/hemorrhage is noted at the level of T11-T12 injury (images 5-11, series 5). No significant epidural fluid collection/hematoma is identified. Evaluation.    Lung/chest wall: Respiratory motion limits evaluation of the lungs. Dependent posterior atelectasis is noted.    LUMBAR SPINE:  Examination is slightly degraded due to patient motion.    Alignment:  There is a slightly exaggerated lumbar lordosis.  Minimal grade 1 retrolisthesis of L2 on L3, likely degenerative in nature, given lack of pars interarticularis defects.    Vertebrae, intervertebral disc and marrow:  Vertebral body heights are grossly preserved, with minimal degenerative irregularity and small Schmorl's node at inferior L1 and L2 endplates.  Previously described nondisplaced cortical buckle fracture of the left L1 and L2 transverse process, better appreciated on prior CT. No corresponding marrow edema, suggesting chronicity.    Slight diffuse heterogeneous T1 hypointense marrow signal, nonspecific.      Spinal Canal: No significant spinal canal stenosis is identified.    Neural foramina: No high-grade neuroforaminal narrowing is identified. Varying degrees of mild multilevel neuroforaminal narrowing, due to marked facet hypertrophy/arthrosis. Ligamentum flavum ossification and marked facet hypertrophy encroaches within the spinal canal is noted at multiple levels    Prevertebral and Intradural/extradural space: No prevertebral soft tissue swelling/edema or significant epidural fluid collection appreciated, within lumbar spine.    Paraspinal soft tissues: Mild STIR hyperintense signal within the bilateral paraspinal soft tissues, may reflect artifact versus mild nonspecific edema. Fatty atrophy of the lumbosacral paraspinal musculature.  Abdominal/Retroperitoneal soft tissues: Large 2.1 cm T2 hyperintense left renal lesion, suboptimally evaluated but likely renal cyst. Similar additional smaller renal lesions noted. Partially imaged gallbladder appears distended, with gallstones. The common bile duct appears slightly prominent (measures 9 mm).    The partially visualized sacrum and sacroiliac joints appear grossly intact.      IMPRESSION:    1.  Acute compression fracture of superior T12 vertebral body (25% height loss), with disruption of the ossified anterior longitudinal ligament, slight widening of anterior T11-T12 disc space and T11-T12 interspinous ligament edema/injury. Abnormal fluid signal within anterior and posterior T11-T12 disc, with questionable minimal edema within the posterior longitudinal ligament. Findings are consistent with unstable 3 column spinal injury, in the setting of possible ankylosing spondylitis.    2.  Bone contusions of T11-T12 spinous processes, withadjacent paraspinal soft tissue contusion. Additional small bone contusion at posterior inferior T11 vertebral body.    3.  Small prevertebral edema/hemorrhage at T11-T12. No significant epidural hematoma or spinal canal stenosis.    4.  Slight diffuse T1 hypointense marrow signal, nonspecific but may suggest anemia amongst multiple other etiologies such as lymphoproliferative disorder, smoking or obesity. Correlate clinically.        Findings were discussed with Dr. Jayesh Calderón (orthopedic surgery service) via telephone on 10/25/2021 at 7:27 PM with verbal read back.    --- End of Report ---            RUTHIE MORE MD; Attending Radiologist  This document has been electronically signed. Oct 25 2021  8:01PM    < end of copied text >  < from: MR Thoracic Spine No Cont (10.25.21 @ 14:28) >    EXAM:  MR SPINE LUMBAR                          EXAM:  MR SPINE THORACIC                            PROCEDURE DATE:  10/25/2021          INTERPRETATION:  MR THORACIC SPINE, MR LUMBAR SPINE    History: Pain. r/o Trauma. Rule out ligamentous injury, status post MF, possible undiagnosed AS. Additional history per EMR: "66y f w/ Nondisplaced subacute left L1/L2 TP fx, age indeterminant VCF T12, hypertrophic ligamentum flavum, minimal left lat subluxation T11-12, break in ALL ossification    Comparison: CT thoracic lumbar spine 10/23/2021    Technique: Multiplanar, multisequence magnetic resonance imaging of the thoracic and lumbar spine regions was performed without contrast.    FINDINGS:    FINDINGS:    The lower cervical spine is partially and suboptimally imaged    THORACIC SPINE:    Alignment, vertebral bodies, intervertebral disc space and marrow:  An exaggerated kyphosis is redemonstrated. Thin ossification of the anterior longitudinal ligament better appreciated on prior CT.  The anterior longitudinal ligament is disrupted at T11-T12, corresponding to the break in ligament ossification seen on recent CT.  Additionally, there is associated widening of the anterior T11-T12 disc space, which demonstrate abnormal intense fluid signalat its anterior two thirds and posterior one third portion (image 8, series 5). There is minimal retrolisthesis of T11 on T12, with lateral subluxation seen on CT. However, the posterior longitudinal ligament appears grossly intact, with questionableminimal fluid signal (image 8, series 5 for example).  Mild compression deformity of the anterior superior T12 endplate (25% height loss), with corresponding marrow edema, suggesting acuity. Small Schmorl's node at inferior T12 endplate.   Small additional marrow edema within the adjacent posterior inferior T11 vertebral body, may reflect bone contusion given no definite corresponding fracture line on CT.  Edema within the T11-T12 intraspinous space and adjacent spinous processes and paraspinal soft tissues, suggesting a spinous ligamentous sprain/injury, with associated T11-T12 spinous process contusions and paraspinal contusion (image 6, series 5).    Slight diffuse T1 hypointense marrow signal, nonspecific.    Spinal Canal: No significant spinal canal stenosis is identified within the thoracic spine and at the level of the described injury at T11-T12.    Neural foramina: No neuroforaminal narrowing identified. Multilevel facet arthrosis and ligamentum flavum ossification noted.    Prevertebral and Intradural/extradural space: Mild prevertebral edema/hemorrhage is noted at the level of T11-T12 injury (images 5-11, series 5). No significant epidural fluid collection/hematoma is identified. Evaluation.    Lung/chest wall: Respiratory motion limits evaluation of the lungs. Dependent posterior atelectasis is noted.    LUMBAR SPINE:  Examination is slightly degraded due to patient motion.    Alignment:  There is a slightly exaggerated lumbar lordosis.  Minimal grade 1 retrolisthesis of L2 on L3, likely degenerative in nature, given lack of pars interarticularis defects.    Vertebrae, intervertebral disc and marrow:  Vertebral body heights are grossly preserved, with minimal degenerative irregularity and small Schmorl's node at inferior L1 and L2 endplates.  Previously described nondisplaced cortical buckle fracture of the left L1 and L2 transverse process, better appreciated on prior CT. No corresponding marrow edema, suggesting chronicity.    Slight diffuse heterogeneous T1 hypointense marrow signal, nonspecific.      Spinal Canal: No significant spinal canal stenosis is identified.    Neural foramina: No high-grade neuroforaminal narrowing is identified. Varying degrees of mild multilevel neuroforaminal narrowing, due to marked facet hypertrophy/arthrosis. Ligamentum flavum ossification and marked facet hypertrophy encroaches within the spinal canal is noted at multiple levels    Prevertebral and Intradural/extradural space: No prevertebral soft tissue swelling/edema or significant epidural fluid collection appreciated, within lumbar spine.    Paraspinal soft tissues: Mild STIR hyperintense signal within the bilateral paraspinal soft tissues, may reflect artifact versus mild nonspecific edema. Fatty atrophy of the lumbosacral paraspinal musculature.  Abdominal/Retroperitoneal soft tissues: Large 2.1 cm T2 hyperintense left renal lesion, suboptimally evaluated but likely renal cyst. Similar additional smaller renal lesions noted. Partially imaged gallbladder appears distended, with gallstones. The common bile duct appears slightly prominent (measures 9 mm).    The partially visualized sacrum and sacroiliac joints appear grossly intact.      IMPRESSION:    1.  Acute compression fracture of superior T12 vertebral body (25% height loss), with disruption of the ossified anterior longitudinal ligament, slight widening of anterior T11-T12 disc space and T11-T12 interspinous ligament edema/injury. Abnormal fluid signal within anterior and posterior T11-T12 disc, with questionable minimal edema within the posterior longitudinal ligament. Findings are consistent with unstable 3 column spinal injury, in the setting of possible ankylosing spondylitis.    2.  Bone contusions of T11-T12 spinous processes, withadjacent paraspinal soft tissue contusion. Additional small bone contusion at posterior inferior T11 vertebral body.    3.  Small prevertebral edema/hemorrhage at T11-T12. No significant epidural hematoma or spinal canal stenosis.    4.  Slight diffuse T1 hypointense marrow signal, nonspecific but may suggest anemia amongst multiple other etiologies such as lymphoproliferative disorder, smoking or obesity. Correlate clinically.        Findings were discussed with Dr. Jayesh Calderón (orthopedic surgery service) via telephone on 10/25/2021 at 7:27 PM with verbal read back.    --- End of Report ---            RUTHIE MORE MD; Attending Radiologist  This document has been electronically signed. Oct 25 2021  8:01PM    < end of copied text >              
Patient is a 66y old  Female who presents with a chief complaint of Intractable mid and lower back pain. (25 Oct 2021 13:46)      INTERVAL /OVERNIGHT EVENTS: still with significant pain    MEDICATIONS  (STANDING):  carBAMazepine 200 milliGRAM(s) Oral two times a day  diVALproex  milliGRAM(s) Oral <User Schedule>  enoxaparin Injectable 40 milliGRAM(s) SubCutaneous at bedtime  lidocaine   4% Patch 1 Patch Transdermal every 24 hours  pantoprazole    Tablet 40 milliGRAM(s) Oral before breakfast    MEDICATIONS  (PRN):  acetaminophen     Tablet .. 650 milliGRAM(s) Oral every 4 hours PRN Mild Pain (1 - 3)  ALPRAZolam 0.25 milliGRAM(s) Oral three times a day PRN anxiety  methocarbamol 1500 milliGRAM(s) Oral three times a day PRN Muscle Spasm  morphine  - Injectable 2 milliGRAM(s) IV Push every 6 hours PRN breakthrough pain  oxyCODONE    IR 5 milliGRAM(s) Oral every 4 hours PRN Moderate Pain (4 - 6)  oxyCODONE    IR 10 milliGRAM(s) Oral every 4 hours PRN Severe Pain (7 - 10)      Allergies    Cipro (Unknown)  vancomycin (Other)    Intolerances        REVIEW OF SYSTEMS:  CONSTITUTIONAL: No fever, weight loss, or fatigue  EYES: No eye pain, visual disturbances, or discharge  ENMT:  No difficulty hearing, tinnitus, vertigo; No sinus or throat pain  NECK: No pain or stiffness  RESPIRATORY: No cough, wheezing, chills or hemoptysis; No shortness of breath  CARDIOVASCULAR: No chest pain, palpitations, dizziness, or leg swelling  GASTROINTESTINAL: No abdominal or epigastric pain. No nausea, vomiting, or hematemesis; No diarrhea or constipation. No melena or hematochezia.  GENITOURINARY: No dysuria, frequency, hematuria, or incontinence  NEUROLOGICAL: No headaches, memory loss, loss of strength, numbness, or tremors  SKIN: No itching, burning, rashes, or lesions   LYMPH NODES: No enlarged glands  ENDOCRINE: No heat or cold intolerance; No hair loss; No polydipsia or polyuria  MUSCULOSKELETAL: + back pain  PSYCHIATRIC: No depression, anxiety, mood swings, or difficulty sleeping  HEME/LYMPH: No easy bruising, or bleeding gums  ALLERGY AND IMMUNOLOGIC: No hives or eczema    Vital Signs Last 24 Hrs  T(C): 36.9 (25 Oct 2021 12:08), Max: 36.9 (24 Oct 2021 20:07)  T(F): 98.4 (25 Oct 2021 12:08), Max: 98.5 (24 Oct 2021 20:07)  HR: 87 (25 Oct 2021 12:08) (81 - 87)  BP: 119/68 (25 Oct 2021 12:08) (112/71 - 119/68)  BP(mean): --  RR: 14 (25 Oct 2021 12:08) (14 - 17)  SpO2: 95% (25 Oct 2021 12:08) (95% - 98%)    PHYSICAL EXAM:  GENERAL: NAD, well-groomed, well-developed  HEAD:  Atraumatic, Normocephalic  EYES: EOMI, PERRLA, conjunctiva and sclera clear  ENMT: No tonsillar erythema, exudates, or enlargement; Moist mucous membranes, Good dentition, No lesions  NECK: Supple, No JVD, Normal thyroid  NERVOUS SYSTEM:  Alert & Oriented X3, Good concentration; Motor Strength 5/5 B/L upper and lower extremities; DTRs 2+ intact and symmetric  CHEST/LUNG: Clear to auscultation bilaterally; No rales, rhonchi, wheezing, or rubs  HEART: Regular rate and rhythm; No murmurs, rubs, or gallops  ABDOMEN: Soft, Nontender, Nondistended; Bowel sounds present  EXTREMITIES:  2+ Peripheral Pulses, No clubbing, cyanosis, or edema  LYMPH: No lymphadenopathy noted  SKIN: No rashes or lesions    LABS:    25 Oct 2021 08:48    140    |  106    |  13     ----------------------------<  71     4.0     |  26     |  0.49     Ca    8.5        25 Oct 2021 08:48          CAPILLARY BLOOD GLUCOSE          RADIOLOGY & ADDITIONAL TESTS:    Notes Reviewed:  [x ] YES  [ ] NO    Care Discussed with Consultants/Other Providers [x ] YES  [ ] NO
Patient is a 66y old  Female who presents with a chief complaint of Intractable mid and lower back pain. (27 Oct 2021 06:39)  comfortable at rest  awaiting transfer to [facility] HPI/OVERNIGHT EVENTS:  T(C): 36.8 (10-27-21 @ 04:39), Max: 36.9 (10-26-21 @ 11:47)  HR: 76 (10-27-21 @ 04:39) (76 - 89)  BP: 131/75 (10-27-21 @ 04:39) (121/74 - 131/75)  RR: 17 (10-27-21 @ 04:39) (17 - 17)  SpO2: 96% (10-27-21 @ 04:39) (96% - 96%)  Wt(kg): --  I&O's Summary      LABS:                        11.3   7.78  )-----------( 421      ( 26 Oct 2021 08:12 )             36.2     10-26    140  |  106  |  12  ----------------------------<  83  4.3   |  27  |  0.46<L>    Ca    8.8      26 Oct 2021 08:12      PT/INR - ( 26 Oct 2021 08:12 )   PT: 12.5 sec;   INR: 1.07 ratio         PTT - ( 26 Oct 2021 08:12 )  PTT:33.1 sec    CAPILLARY BLOOD GLUCOSE                MEDICATIONS  (STANDING):  carBAMazepine 200 milliGRAM(s) Oral two times a day  diVALproex  milliGRAM(s) Oral <User Schedule>  lidocaine   4% Patch 1 Patch Transdermal every 24 hours  pantoprazole    Tablet 40 milliGRAM(s) Oral before breakfast    MEDICATIONS  (PRN):  acetaminophen     Tablet .. 650 milliGRAM(s) Oral every 4 hours PRN Mild Pain (1 - 3)  ALPRAZolam 0.25 milliGRAM(s) Oral three times a day PRN anxiety  methocarbamol 1500 milliGRAM(s) Oral three times a day PRN Muscle Spasm  morphine  - Injectable 2 milliGRAM(s) IV Push every 6 hours PRN breakthrough pain  oxyCODONE    IR 5 milliGRAM(s) Oral every 4 hours PRN Moderate Pain (4 - 6)  oxyCODONE    IR 10 milliGRAM(s) Oral every 4 hours PRN Severe Pain (7 - 10)      REVIEW OF SYSTEMS:  CONSTITUTIONAL: No fever, weight loss, or fatigue  EYES: No eye pain, visual disturbances, or discharge  ENMT:  No difficulty hearing, tinnitus, vertigo; No sinus or throat pain  NECK: No pain or stiffness  RESPIRATORY: No cough, wheezing, chills or hemoptysis; No shortness of breath  CARDIOVASCULAR: No chest pain, palpitations, dizziness, or leg swelling  GASTROINTESTINAL: No abdominal or epigastric pain. No nausea, vomiting, or hematemesis; No diarrhea or constipation. No melena or hematochezia.  GENITOURINARY: No dysuria, frequency, hematuria, or incontinence  NEUROLOGICAL: No headaches, memory loss, loss of strength, numbness, or tremors  SKIN: No itching, burning, rashes, or lesions   LYMPH NODES: No enlarged glands  ENDOCRINE: No heat or cold intolerance; No hair loss  MUSCULOSKELETAL: mid and lower back pain  PSYCHIATRIC: No depression, anxiety, mood swings, or difficulty sleeping  HEME/LYMPH: No easy bruising, or bleeding gums  ALLERY AND IMMUNOLOGIC: No hives or eczema    RADIOLOGY & ADDITIONAL TESTS:    Imaging Personally Reviewed:  [ ] YES  [ ] NO    Consultant(s) Notes Reviewed:  [ ] YES  [ ] NO    PHYSICAL EXAM:  GENERAL: NAD, well-groomed, well-developed  HEAD:  Atraumatic, Normocephalic  EYES: EOMI, PERRLA, conjunctiva and sclera clear  ENMT: No tonsillar erythema, exudates, or enlargement; Moist mucous membranes, Good dentition, No lesions  NECK: Supple, No JVD, Normal thyroid  NERVOUS SYSTEM:  Alert & Oriented X3, Good concentration; Motor Strength 5/5 B/L upper and lower extremities; DTRs 2+ intact and symmetric  CHEST/LUNG: Clear to percussion bilaterally; No rales, rhonchi, wheezing, or rubs  HEART: Regular rate and rhythm; No murmurs, rubs, or gallops  ABDOMEN: Soft, Nontender, Nondistended; Bowel sounds present  EXTREMITIES:  2+ Peripheral Pulses, No clubbing, cyanosis, or edema  LYMPH: No lymphadenopathy noted  SKIN: No rashes or lesions    Care Discussed with Consultants/Other Providers [x ] YES  [ ] NO
Patient is a 66y old  Female who presents with a chief complaint of Intractable mid and lower back pain. (24 Oct 2021 08:04)  pain with movement  comfortable at rest      INTERVAL HPI/OVERNIGHT EVENTS:  T(C): 36.8 (10-24-21 @ 14:07), Max: 37.2 (10-24-21 @ 08:08)  HR: 98 (10-24-21 @ 14:07) (89 - 101)  BP: 133/67 (10-24-21 @ 14:07) (108/73 - 133/67)  RR: 18 (10-24-21 @ 14:07) (18 - 18)  SpO2: 96% (10-24-21 @ 14:07) (96% - 98%)  Wt(kg): --  I&O's Summary      LABS:                        11.9   7.57  )-----------( 403      ( 23 Oct 2021 11:42 )             38.4     10    144  |  109<H>  |  17  ----------------------------<  92  5.1   |  30  |  0.73    Ca    8.7      23 Oct 2021 11:42    TPro  7.4  /  Alb  3.3  /  TBili  0.3  /  DBili  x   /  AST  22  /  ALT  20  /  AlkPhos  121<H>  10      Urinalysis Basic - ( 23 Oct 2021 16:16 )    Color: Yellow / Appearance: Clear / S.010 / pH: x  Gluc: x / Ketone: Negative  / Bili: Negative / Urobili: Negative   Blood: x / Protein: Negative / Nitrite: Negative   Leuk Esterase: Trace / RBC: 3-5 /HPF / WBC 11-25   Sq Epi: x / Non Sq Epi: Few / Bacteria: Few      CAPILLARY BLOOD GLUCOSE            Urinalysis Basic - ( 23 Oct 2021 16:16 )    Color: Yellow / Appearance: Clear / S.010 / pH: x  Gluc: x / Ketone: Negative  / Bili: Negative / Urobili: Negative   Blood: x / Protein: Negative / Nitrite: Negative   Leuk Esterase: Trace / RBC: 3-5 /HPF / WBC 11-25   Sq Epi: x / Non Sq Epi: Few / Bacteria: Few        MEDICATIONS  (STANDING):  carBAMazepine 200 milliGRAM(s) Oral two times a day  diVALproex  milliGRAM(s) Oral <User Schedule>  enoxaparin Injectable 40 milliGRAM(s) SubCutaneous at bedtime  pantoprazole    Tablet 40 milliGRAM(s) Oral before breakfast    MEDICATIONS  (PRN):  acetaminophen     Tablet .. 650 milliGRAM(s) Oral every 4 hours PRN Mild Pain (1 - 3)  ALPRAZolam 0.25 milliGRAM(s) Oral two times a day PRN anxiety  methocarbamol 1500 milliGRAM(s) Oral three times a day PRN Muscle Spasm  oxyCODONE    IR 5 milliGRAM(s) Oral every 4 hours PRN Moderate Pain (4 - 6)  oxyCODONE    IR 10 milliGRAM(s) Oral every 4 hours PRN Severe Pain (7 - 10)      REVIEW OF SYSTEMS:  CONSTITUTIONAL: No fever, weight loss, or fatigue  EYES: No eye pain, visual disturbances, or discharge  ENMT:  No difficulty hearing, tinnitus, vertigo; No sinus or throat pain  NECK: No pain or stiffness  RESPIRATORY: No cough, wheezing, chills or hemoptysis; No shortness of breath  CARDIOVASCULAR: No chest pain, palpitations, dizziness, or leg swelling  GASTROINTESTINAL: No abdominal or epigastric pain. No nausea, vomiting, or hematemesis; No diarrhea or constipation. No melena or hematochezia.  GENITOURINARY: No dysuria, frequency, hematuria, or incontinence  NEUROLOGICAL: No headaches, memory loss, loss of strength, numbness, or tremors  SKIN: No itching, burning, rashes, or lesions   LYMPH NODES: No enlarged glands  ENDOCRINE: No heat or cold intolerance; No hair loss  MUSCULOSKELETAL: back pain  PSYCHIATRIC: No depression, anxiety, mood swings, or difficulty sleeping  HEME/LYMPH: No easy bruising, or bleeding gums  ALLERY AND IMMUNOLOGIC: No hives or eczema    RADIOLOGY & ADDITIONAL TESTS:    Imaging Personally Reviewed:  [ ] YES  [ ] NO    Consultant(s) Notes Reviewed:  [ ] YES  [ ] NO    PHYSICAL EXAM:  GENERAL: NAD, well-groomed, well-developed  HEAD:  Atraumatic, Normocephalic  EYES: EOMI, PERRLA, conjunctiva and sclera clear  ENMT: No tonsillar erythema, exudates, or enlargement; Moist mucous membranes, Good dentition, No lesions  NECK: Supple, No JVD, Normal thyroid  NERVOUS SYSTEM:  Alert & Oriented X3, Good concentration; Motor Strength 5/5 B/L upper and lower extremities; DTRs 2+ intact and symmetric  CHEST/LUNG: Clear to percussion bilaterally; No rales, rhonchi, wheezing, or rubs  HEART: Regular rate and rhythm; No murmurs, rubs, or gallops  ABDOMEN: Soft, Nontender, Nondistended; Bowel sounds present  EXTREMITIES:  2+ Peripheral Pulses, No clubbing, cyanosis, or edema  LYMPH: No lymphadenopathy noted  SKIN: No rashes or lesions    Care Discussed with Consultants/Other Providers [x ] YES  [ ] NO    advance care planning and advance directives discussion, including long term care planning [x]YES   [ ]NO
Patient is a 66y old  Female who presents with a chief complaint of Intractable mid and lower back pain. (26 Oct 2021 18:05)      INTERVAL /OVERNIGHT EVENTS: still with back pain    MEDICATIONS  (STANDING):  carBAMazepine 200 milliGRAM(s) Oral two times a day  diVALproex  milliGRAM(s) Oral <User Schedule>  lidocaine   4% Patch 1 Patch Transdermal every 24 hours  pantoprazole    Tablet 40 milliGRAM(s) Oral before breakfast    MEDICATIONS  (PRN):  acetaminophen     Tablet .. 650 milliGRAM(s) Oral every 4 hours PRN Mild Pain (1 - 3)  ALPRAZolam 0.25 milliGRAM(s) Oral three times a day PRN anxiety  methocarbamol 1500 milliGRAM(s) Oral three times a day PRN Muscle Spasm  morphine  - Injectable 2 milliGRAM(s) IV Push every 6 hours PRN breakthrough pain  oxyCODONE    IR 5 milliGRAM(s) Oral every 4 hours PRN Moderate Pain (4 - 6)  oxyCODONE    IR 10 milliGRAM(s) Oral every 4 hours PRN Severe Pain (7 - 10)      Allergies    Cipro (Unknown)  vancomycin (Other)    Intolerances        REVIEW OF SYSTEMS:  CONSTITUTIONAL: No fever, weight loss, or fatigue  EYES: No eye pain, visual disturbances, or discharge  ENMT:  No difficulty hearing, tinnitus, vertigo; No sinus or throat pain  NECK: No pain or stiffness  RESPIRATORY: No cough, wheezing, chills or hemoptysis; No shortness of breath  CARDIOVASCULAR: No chest pain, palpitations, dizziness, or leg swelling  GASTROINTESTINAL: No abdominal or epigastric pain. No nausea, vomiting, or hematemesis; No diarrhea or constipation. No melena or hematochezia.  GENITOURINARY: No dysuria, frequency, hematuria, or incontinence  NEUROLOGICAL: No headaches, memory loss, loss of strength, numbness, or tremors  SKIN: No itching, burning, rashes, or lesions   LYMPH NODES: No enlarged glands  ENDOCRINE: No heat or cold intolerance; No hair loss; No polydipsia or polyuria  MUSCULOSKELETAL: + backpain  PSYCHIATRIC: No depression, anxiety, mood swings, or difficulty sleeping  HEME/LYMPH: No easy bruising, or bleeding gums  ALLERGY AND IMMUNOLOGIC: No hives or eczema    Vital Signs Last 24 Hrs  T(C): 36.3 (26 Oct 2021 20:38), Max: 36.9 (26 Oct 2021 11:47)  T(F): 97.4 (26 Oct 2021 20:38), Max: 98.4 (26 Oct 2021 11:47)  HR: 88 (26 Oct 2021 20:38) (88 - 93)  BP: 123/67 (26 Oct 2021 20:38) (101/66 - 123/67)  BP(mean): --  RR: 17 (26 Oct 2021 20:38) (17 - 17)  SpO2: 96% (26 Oct 2021 20:38) (96% - 96%)    PHYSICAL EXAM:  GENERAL: NAD, well-groomed, well-developed  HEAD:  Atraumatic, Normocephalic  EYES: EOMI, PERRLA, conjunctiva and sclera clear  ENMT: No tonsillar erythema, exudates, or enlargement; Moist mucous membranes, Good dentition, No lesions  NECK: Supple, No JVD, Normal thyroid  NERVOUS SYSTEM:  Alert & Oriented X3, Good concentration; Motor Strength 5/5 B/L upper and lower extremities; DTRs 2+ intact and symmetric  CHEST/LUNG: Clear to auscultation bilaterally; No rales, rhonchi, wheezing, or rubs  HEART: Regular rate and rhythm; No murmurs, rubs, or gallops  ABDOMEN: Soft, Nontender, Nondistended; Bowel sounds present  EXTREMITIES:  2+ Peripheral Pulses, No clubbing, cyanosis, or edema  LYMPH: No lymphadenopathy noted  SKIN: No rashes or lesions    LABS:                        11.3   7.78  )-----------( 421      ( 26 Oct 2021 08:12 )             36.2     26 Oct 2021 08:12    140    |  106    |  12     ----------------------------<  83     4.3     |  27     |  0.46     Ca    8.8        26 Oct 2021 08:12      PT/INR - ( 26 Oct 2021 08:12 )   PT: 12.5 sec;   INR: 1.07 ratio         PTT - ( 26 Oct 2021 08:12 )  PTT:33.1 sec    CAPILLARY BLOOD GLUCOSE          RADIOLOGY & ADDITIONAL TESTS:    Notes Reviewed:  [x ] YES  [ ] NO    Care Discussed with Consultants/Other Providers [x ] YES  [ ] NO

## 2021-10-27 NOTE — H&P ADULT - HISTORY OF PRESENT ILLNESS
66F hx breast CA in remission, benign brain CA S/p excision, skin CA, seizure presented to [facility] ED with back pain, transferred to [facility] per family request. Patient is a community ambulator with cane and lives with her son and daughter in law. Patient notes that about three weeks ago, she was walking in her home when she tripped over some power cords and landed on her back. She noted immediate back pain but was able to continue walking and performing ADLs. She went to seek medical attention a few days after and was prescribed a muscle relaxant which helped with her pain. However, about a week ago, she tried to stop using her muscle relaxer and began to start experiencing pain in the lower back that would radiate around to the abdomen. She made an appointment with Dr. Link of [facility] for next Tuesday, but the pain became so bad that she had to come to the ED. Patient denies leg pain, saddle anesthesia, bowel or bladder incontinence, numbness, tingling, weakness, or any other orthopaedic complaint.     Exam:   Vital Signs Last 24 Hrs  T(C): 37 (27 Oct 2021 13:25), Max: 37 (27 Oct 2021 13:25)  T(F): 98.6 (27 Oct 2021 13:25), Max: 98.6 (27 Oct 2021 13:25)  HR: 87 (27 Oct 2021 13:25) (76 - 88)  BP: 116/74 (27 Oct 2021 13:25) (116/74 - 131/75)  BP(mean): --  RR: 18 (27 Oct 2021 13:25) (17 - 18)  SpO2: 94% (27 Oct 2021 13:25) (94% - 96%)    Gen: NAD, in bed comfortably  Spine:   Skin intact. No edema, erythema, or lesions of the skin. No visualized deformity.   No TTP to the cervical/thoracic/lumbar spine  Negative Marsh's, negative Babinksi, negative myoclonus.   DP, radial pulses palpable.  No calf tenderness bilaterally.  Compartments soft and compressible.     Motor:                   C5                C6              C7               C8           T1   R            5/5                5/5            5/5             5/5          5/5  L             5/5               5/5             5/5             5/5          5/5                L2             L3             L4               L5            S1  R         5/5           5/5          5/5             5/5           5/5  L          5/5          5/5           5/5             5/5           5/5    Sensory:            C5         C6         C7      C8       T1        (0=absent, 1=impaired, 2=normal, NT=not testable)  R         2            2           2        2         2  L          2            2           2        2         2               L2          L3         L4      L5       S1         (0=absent, 1=impaired, 2=normal, NT=not testable)  R         2            2            2        2        2  L          2            2           2        2         2      Imaging: MRI imaging review from [facility] shows an acute VCF at T12 and a 3 column injury at T11-12    A:  66F with Nondisplaced Subacute left L1 & L2 TP fractures, acute VCF T12, and a 3 column injury at T11-12    Plan:   Patient transferred from [place] to [facility] under Dr Prater service  Exam unchanged after transfer  FU TLSO brace   Patient is bed rest until TLSO brace has arrived  Once TLSO arrives, then will plan to have patient up with PT   Will monitor exam  Pain control  Discussed with Dr Prater who is aware and agrees with the above

## 2021-10-27 NOTE — PROGRESS NOTE ADULT - REASON FOR ADMISSION
Intractable mid and lower back pain.

## 2021-10-27 NOTE — CHART NOTE - NSCHARTNOTEFT_GEN_A_CORE
Evaluate and measure for custom bivalve TLSO with sternal extension. Measurements taken supine without incident.  Orthosis to be fit and delivered 10/28/21.    Orthosis to be worn when out of bed to protect and stabilize spine reduce pain y reducing mobiility of the trunk facilitate healing and prevent additional injury.    Ray SHAW  [facility]  [phone]

## 2021-10-27 NOTE — PROGRESS NOTE ADULT - PROVIDER SPECIALTY LIST ADULT
Neurology
Neurology
Cardiology
Neurology
Orthopedics
Family Medicine
Family Medicine
Hospitalist
Hospitalist

## 2021-10-27 NOTE — PROGRESS NOTE ADULT - ATTENDING COMMENTS
patient has been evaluated and treated and managed well for her injuries at [facility].  Appropriate diagnosis of a high risk condition has been made and treated.  patient was scheduled for spine surgery.  However, patient's daughter seeks care at tertiary care center.  Discussion for transfer to [facility] is performed for continuity of care.   However, patient's daughter requests transfer to [place].  Family is aware that they have to initiate transfer as all services can be performed at [place].  Given her unstable spine, there are risk of transfer.  Will sign off from spine surgery perspective, given patient's daughters request for care at different facility.  Patient understands and is overall satisfied with care provided.    Fracture care for unstable spine fracture has been provided.
Chart reviewed    Patient seen and examined    Agree with plan as outlined above    67 YO Female  presented to ED for a severe back pain. Patient states that trip and fall 3 weeks ago landing on back.  Cardiology called for pre/post cardiac optimization     Preop for surgical fixation  - admitted for severe back pain, s/p trip and fall 3 weeks ago  - MRI: Acute compression fracture of superior T12 vertebral body (25% height loss), with disruption of the ossified anterior longitudinal ligament, slight widening of anterior T11-T12 disc space and T11-T12 interspinous ligament edema/injury.   - Planned for surgical fixation for stabilization of the spinal column.   - No active cardiac condition.   - Denies chest pain, palpitation, SOB, syncope, dizziness, lightheadedness, orthopnea, PND, BACA and edema  - EKG demonstrates NSR 1st degree AVB HR 86 bpm, No acute ischemic changes noted.   - Vitals stable, - BP: 131/75 (10-27-21 @ 04:39) (123/67 - 131/75), HR: 76 (10-27 @ 04:39) (76 - 88)  - Patient euvolemic on examination with no overt signs of heart failure or cardiac ischemia.   - No severe valvular abnormalities noted on examination  - Monitor and replete Lytes. Keep K > 4 and Mg > 2  - Pt has no active ischemia, decompensated heart failure, unstable arrythmia, or severe stenotic valvular disease. Patient is optimized from cardiovascular standpoint to proceed with planned procedure with routine hemodynamic monitoring.
all questions answered in detail with patient.  venodynes for dvt prophylaxis.  orthopedics team will sign off in presence of expected transfer.

## 2021-10-27 NOTE — PROGRESS NOTE ADULT - PROBLEM SELECTOR PLAN 1
Spinal/Ortho surgery consult.  Pain management   mri with spinal fx
Spinal/Ortho surgery consult.    Pain management  await mri
Spinal/Ortho surgery consult.  Pain management   mri with spinal fx as noted  await transfer to [place]
Spinal/Ortho surgery consult.  Pain management  await mri

## 2021-10-27 NOTE — PROGRESS NOTE ADULT - PROBLEM SELECTOR PLAN 4
Continue anti seizure meds
Continue anti seizure meds  neuro eval with Dr. cheney
Continue anti seizure meds  neuro eval with Dr. cheney
Continue anti seizure meds  neuro eval

## 2021-10-27 NOTE — PROGRESS NOTE ADULT - PROBLEM SELECTOR PLAN 2
Balance training after Ortho clearance  ? RUSTY
PT follow up after ortho/spine intervention and clearance
Balance training after Ortho clearance  ? RUSTY
Balance training after Ortho clearance

## 2021-10-28 DIAGNOSIS — M48.50XA COLLAPSED VERTEBRA, NOT ELSEWHERE CLASSIFIED, SITE UNSPECIFIED, INITIAL ENCOUNTER FOR FRACTURE: ICD-10-CM

## 2021-10-28 DIAGNOSIS — R56.9 UNSPECIFIED CONVULSIONS: ICD-10-CM

## 2021-10-28 DIAGNOSIS — R52 PAIN, UNSPECIFIED: ICD-10-CM

## 2021-10-28 LAB
ANION GAP SERPL CALC-SCNC: 12 MMOL/L — SIGNIFICANT CHANGE UP (ref 5–17)
BUN SERPL-MCNC: 13 MG/DL — SIGNIFICANT CHANGE UP (ref 7–23)
CALCIUM SERPL-MCNC: 9.1 MG/DL — SIGNIFICANT CHANGE UP (ref 8.4–10.5)
CHLORIDE SERPL-SCNC: 99 MMOL/L — SIGNIFICANT CHANGE UP (ref 96–108)
CO2 SERPL-SCNC: 24 MMOL/L — SIGNIFICANT CHANGE UP (ref 22–31)
CREAT SERPL-MCNC: 0.56 MG/DL — SIGNIFICANT CHANGE UP (ref 0.5–1.3)
GLUCOSE SERPL-MCNC: 92 MG/DL — SIGNIFICANT CHANGE UP (ref 70–99)
HCT VFR BLD CALC: 36.4 % — SIGNIFICANT CHANGE UP (ref 34.5–45)
HGB BLD-MCNC: 11.9 G/DL — SIGNIFICANT CHANGE UP (ref 11.5–15.5)
MCHC RBC-ENTMCNC: 28.7 PG — SIGNIFICANT CHANGE UP (ref 27–34)
MCHC RBC-ENTMCNC: 32.7 GM/DL — SIGNIFICANT CHANGE UP (ref 32–36)
MCV RBC AUTO: 87.7 FL — SIGNIFICANT CHANGE UP (ref 80–100)
NRBC # BLD: 0 /100 WBCS — SIGNIFICANT CHANGE UP (ref 0–0)
PLATELET # BLD AUTO: 498 K/UL — HIGH (ref 150–400)
POTASSIUM SERPL-MCNC: 4 MMOL/L — SIGNIFICANT CHANGE UP (ref 3.5–5.3)
POTASSIUM SERPL-SCNC: 4 MMOL/L — SIGNIFICANT CHANGE UP (ref 3.5–5.3)
RBC # BLD: 4.15 M/UL — SIGNIFICANT CHANGE UP (ref 3.8–5.2)
RBC # FLD: 12 % — SIGNIFICANT CHANGE UP (ref 10.3–14.5)
SODIUM SERPL-SCNC: 135 MMOL/L — SIGNIFICANT CHANGE UP (ref 135–145)
WBC # BLD: 8.49 K/UL — SIGNIFICANT CHANGE UP (ref 3.8–10.5)
WBC # FLD AUTO: 8.49 K/UL — SIGNIFICANT CHANGE UP (ref 3.8–10.5)

## 2021-10-28 RX ORDER — INFLUENZA VIRUS VACCINE 15; 15; 15; 15 UG/.5ML; UG/.5ML; UG/.5ML; UG/.5ML
0.7 SUSPENSION INTRAMUSCULAR ONCE
Refills: 0 | Status: DISCONTINUED | OUTPATIENT
Start: 2021-10-28 | End: 2021-11-15

## 2021-10-28 RX ORDER — HEPARIN SODIUM 5000 [USP'U]/ML
5000 INJECTION INTRAVENOUS; SUBCUTANEOUS EVERY 8 HOURS
Refills: 0 | Status: COMPLETED | OUTPATIENT
Start: 2021-10-28 | End: 2021-11-08

## 2021-10-28 RX ADMIN — METHOCARBAMOL 750 MILLIGRAM(S): 500 TABLET, FILM COATED ORAL at 06:08

## 2021-10-28 RX ADMIN — Medication 650 MILLIGRAM(S): at 18:06

## 2021-10-28 RX ADMIN — METHOCARBAMOL 750 MILLIGRAM(S): 500 TABLET, FILM COATED ORAL at 18:06

## 2021-10-28 RX ADMIN — HEPARIN SODIUM 5000 UNIT(S): 5000 INJECTION INTRAVENOUS; SUBCUTANEOUS at 13:07

## 2021-10-28 RX ADMIN — Medication 650 MILLIGRAM(S): at 13:08

## 2021-10-28 RX ADMIN — Medication 200 MILLIGRAM(S): at 06:08

## 2021-10-28 RX ADMIN — Medication 650 MILLIGRAM(S): at 06:08

## 2021-10-28 RX ADMIN — Medication 650 MILLIGRAM(S): at 23:36

## 2021-10-28 RX ADMIN — PANTOPRAZOLE SODIUM 40 MILLIGRAM(S): 20 TABLET, DELAYED RELEASE ORAL at 06:08

## 2021-10-28 RX ADMIN — HEPARIN SODIUM 5000 UNIT(S): 5000 INJECTION INTRAVENOUS; SUBCUTANEOUS at 23:07

## 2021-10-28 RX ADMIN — Medication 200 MILLIGRAM(S): at 18:06

## 2021-10-28 RX ADMIN — DIVALPROEX SODIUM 250 MILLIGRAM(S): 500 TABLET, DELAYED RELEASE ORAL at 18:07

## 2021-10-28 RX ADMIN — Medication 650 MILLIGRAM(S): at 06:51

## 2021-10-28 RX ADMIN — HEPARIN SODIUM 5000 UNIT(S): 5000 INJECTION INTRAVENOUS; SUBCUTANEOUS at 06:24

## 2021-10-28 RX ADMIN — DIVALPROEX SODIUM 250 MILLIGRAM(S): 500 TABLET, DELAYED RELEASE ORAL at 06:08

## 2021-10-28 RX ADMIN — Medication 650 MILLIGRAM(S): at 23:06

## 2021-10-28 NOTE — PROGRESS NOTE ADULT - SUBJECTIVE AND OBJECTIVE BOX
Pt seen and examined at bedside. No acute overnight events. Pain well controlled. Denies numbness, weakness or tingling. Denies saddle anesthesia or bladder/bowel incontinence. Denies SOB, CP, fever or chills.    Vital Signs Last 24 Hrs  T(C): 36.4 (28 Oct 2021 04:37), Max: 37 (27 Oct 2021 13:25)  T(F): 97.6 (28 Oct 2021 04:37), Max: 98.6 (27 Oct 2021 13:25)  HR: 80 (28 Oct 2021 04:37) (78 - 87)  BP: 123/73 (28 Oct 2021 04:37) (116/74 - 141/83)  BP(mean): --  RR: 18 (28 Oct 2021 04:37) (17 - 18)  SpO2: 97% (28 Oct 2021 04:37) (94% - 98%)             PHYSICAL EXAM  GEN: NAD, AAOx3.   SPINE:  [Spinal inspection and palpation deferred in the context of clinically unstable spine prohibitive of unassisted manipulation]   UE:  Motor:                          Deltoid       Biceps      Triceps      Wrist Ext      Finger Flex    Finger Abduction   RIGHT             5/5             5/5             5/5             5/5                 5/5                     5/5  LEFT                5/5             5/5             5/5             5/5                 5/5                     5/5    SILT C5-T1.   + Radial Pulse.     LE                        Hip Flex       Knee Ext        Knee Flex     Dorsiflex      Hallux Ext        PlantarFlex  RIGHT            5/5                5/5                 5/5               5/5                 5/5                    5/5  LEFT               5/5                5/5                 5/5               5/5                 5/5                    5/5    SILT L2-S1.   + DP/PT Pulses.   No saddle anesthesia.   Compartments soft and compressible.   Calves non-TTP bilaterally.   Negative Marsh's sign bilaterally  Negative Babinski bilaterally   Negative Myoclonus bilaterally        ASSESSMENT:  66F with unstable 3 column injury to the spine injury associated with VCF at T12.     PLAN:    - Pain control.   - DVT PPX: heparin.   - Fitted for TSLO brace  - Ambulate w/ PT only w/ TSLO brace  - Plan for CT spine in 2-3 days  - Possible OR next week  - Will obtain medical clearance  - Will obtain consent

## 2021-10-28 NOTE — PROGRESS NOTE ADULT - SUBJECTIVE AND OBJECTIVE BOX
66F hx breast CA in remission, benign brain CA S/p excision, skin CA, seizure presented to [facility] ED with back pain, transferred to [facility] per family request. Patient is a community ambulator with cane and lives with her son and daughter in law. Patient notes that about three weeks ago, she was walking in her home when she tripped over some power cords and landed on her back. She noted immediate back pain but was able to continue walking and performing ADLs. She went to seek medical attention a few days after and was prescribed a muscle relaxant which helped with her pain. However, about a week ago, she tried to stop using her muscle relaxer and began to start experiencing pain in the lower back that would radiate around to the abdomen. She made an appointment with Dr. Link [facility] for next Tuesday, but the pain became so bad that she had to come to the ED. Patient denies leg pain, saddle anesthesia, bowel or bladder incontinence, numbness, tingling, weakness, or any other orthopaedic complaint. Patient seen now lying bed resting comfortably. On MRI Patient found to have an acute vertebral compression fx of T12        PAST MEDICAL & SURGICAL HISTORY:  Seizures    Brain cancer    Breast cancer    Melanoma    History of hip replacement          MEDICATIONS  (STANDING):  acetaminophen     Tablet .. 650 milliGRAM(s) Oral every 6 hours  carBAMazepine 200 milliGRAM(s) Oral two times a day  diVALproex  milliGRAM(s) Oral two times a day  heparin   Injectable 5000 Unit(s) SubCutaneous every 8 hours  influenza  Vaccine (HIGH DOSE) 0.7 milliLiter(s) IntraMuscular once  pantoprazole    Tablet 40 milliGRAM(s) Oral before breakfast    MEDICATIONS  (PRN):  magnesium hydroxide Suspension 30 milliLiter(s) Oral daily PRN Constipation  methocarbamol 750 milliGRAM(s) Oral three times a day PRN Muscle Spasm  ondansetron Injectable 4 milliGRAM(s) IV Push every 8 hours PRN Nausea and/or Vomiting  oxyCODONE    IR 10 milliGRAM(s) Oral every 4 hours PRN Severe Pain (7 - 10)  oxyCODONE    IR 5 milliGRAM(s) Oral every 4 hours PRN Moderate Pain (4 - 6)  traMADol 50 milliGRAM(s) Oral every 6 hours PRN Mild Pain (1 - 3)        CONSTITUTIONAL: No weakness, fevers or chills  EYES/ENT: No visual changes;  No vertigo or throat pain   NECK: No pain or stiffness  RESPIRATORY: No cough, wheezing, hemoptysis; No shortness of breath  CARDIOVASCULAR: No chest pain or palpitations  GASTROINTESTINAL: No abdominal or epigastric pain. No nausea, vomiting, or hematemesis; No diarrhea or constipation. No melena or hematochezia.  GENITOURINARY: No dysuria, frequency or hematuria  NEUROLOGICAL: No numbness or weakness  SKIN: No itching, burning, rashes, or lesions   MUSCULOSKELETAL: back pain    INTERVAL HPI/OVERNIGHT EVENTS:  T(C): 36.7 (10-28-21 @ 09:20), Max: 36.7 (10-27-21 @ 21:51)  HR: 74 (10-28-21 @ 09:20) (74 - 84)  BP: 112/62 (10-28-21 @ 09:20) (112/62 - 141/83)  RR: 14 (10-28-21 @ 09:20) (14 - 18)  SpO2: 97% (10-28-21 @ 09:20) (95% - 98%)  Wt(kg): --  I&O's Summary    27 Oct 2021 07:01  -  28 Oct 2021 07:00  --------------------------------------------------------  IN: 480 mL / OUT: 400 mL / NET: 80 mL        PHYSICAL EXAM:  GENERAL: NAD, well-groomed, well-developed  HEAD:  Atraumatic, Normocephalic  EYES: EOMI, PERRLA, conjunctiva and sclera clear  ENMT: No tonsillar erythema, exudates, or enlargement; Moist mucous membranes, Good dentition, No lesions  NECK: Supple, No JVD, Normal thyroid  NERVOUS SYSTEM:  Alert & Oriented X3, Good concentration; Motor Strength 5/5 B/L upper and lower extremities; DTRs 2+ intact and symmetric  CHEST/LUNG: Clear to percussion bilaterally; No rales, rhonchi, wheezing, or rubs  HEART: Regular rate and rhythm; No murmurs, rubs, or gallops  ABDOMEN: Soft, Nontender, Nondistended; Bowel sounds present  EXTREMITIES:  2+ Peripheral Pulses, No clubbing, cyanosis, or edema  LYMPH: No lymphadenopathy noted  SKIN: No rashes or lesions        LABS:                        11.9   8.49  )-----------( 498      ( 28 Oct 2021 06:30 )             36.4     10-28    135  |  99  |  13  ----------------------------<  92  4.0   |  24  |  0.56    Ca    9.1      28 Oct 2021 06:30          CAPILLARY BLOOD GLUCOSE                Radiology reports:

## 2021-10-28 NOTE — CONSULT NOTE ADULT - PROBLEM SELECTOR RECOMMENDATION 9
Patient followed by ortho spine  possible kyphoplasty  awaiting TLSO brace  physical therapy as tolerated  DVT and GI prophylaxis

## 2021-10-28 NOTE — CHART NOTE - NSCHARTNOTEFT_GEN_A_CORE
Patient Rossana Kline was dispensed a custom TLSO. The patient was not ready for P.T. I spoke with the PA and I will coordinate with P.T tomorrow morning. Contact info was given to Rossana. All went without incident.   Bhavesh Florez [facility]

## 2021-10-28 NOTE — CONSULT NOTE ADULT - SUBJECTIVE AND OBJECTIVE BOX
66F hx breast CA in remission, benign brain CA S/p excision, skin CA, seizure presented to [facility] ED with back pain, transferred to [facility] per family request. Patient is a community ambulator with cane and lives with her son and daughter in law. Patient notes that about three weeks ago, she was walking in her home when she tripped over some power cords and landed on her back. She noted immediate back pain but was able to continue walking and performing ADLs. She went to seek medical attention a few days after and was prescribed a muscle relaxant which helped with her pain. However, about a week ago, she tried to stop using her muscle relaxer and began to start experiencing pain in the lower back that would radiate around to the abdomen. She made an appointment with Dr. [facility] for next Tuesday, but the pain became so bad that she had to come to the ED. Patient denies leg pain, saddle anesthesia, bowel or bladder incontinence, numbness, tingling, weakness, or any other orthopaedic complaint.     Exam:   Vital Signs Last 24 Hrs  T(C): 37 (27 Oct 2021 13:25), Max: 37 (27 Oct 2021 13:25)  T(F): 98.6 (27 Oct 2021 13:25), Max: 98.6 (27 Oct 2021 13:25)  HR: 87 (27 Oct 2021 13:25) (76 - 88)  BP: 116/74 (27 Oct 2021 13:25) (116/74 - 131/75)  BP(mean): --  RR: 18 (27 Oct 2021 13:25) (17 - 18)  SpO2: 94% (27 Oct 2021 13:25) (94% - 96%)    Gen: NAD, in bed comfortably  Spine:   Skin intact. No edema, erythema, or lesions of the skin. No visualized deformity.   No TTP to the cervical/thoracic/lumbar spine  Negative Marsh's, negative Babinksi, negative myoclonus.   DP, radial pulses palpable.  No calf tenderness bilaterally.  Compartments soft and compressible.     Motor:                   C5                C6              C7               C8           T1   R            5/5                5/5            5/5             5/5          5/5  L             5/5               5/5             5/5             5/5          5/5                L2             L3             L4               L5            S1  R         5/5           5/5          5/5             5/5           5/5  L          5/5          5/5           5/5             5/5           5/5    Sensory:            C5         C6         C7      C8       T1        (0=absent, 1=impaired, 2=normal, NT=not testable)  R         2            2           2        2         2  L          2            2           2        2         2               L2          L3         L4      L5       S1         (0=absent, 1=impaired, 2=normal, NT=not testable)  R         2            2            2        2        2  L          2            2           2        2         2      Imaging: MRI imaging review from [facility] shows an acute VCF at T12 and a 3 column injury at T11-12
66F hx breast CA in remission, benign brain CA S/p excision, skin CA, seizure presented to [facility] ED with back pain, transferred to [facility] per family request. Patient is a community ambulator with cane and lives with her son and daughter in law. Patient notes that about three weeks ago, she was walking in her home when she tripped over some power cords and landed on her back. She noted immediate back pain but was able to continue walking and performing ADLs. She went to seek medical attention a few days after and was prescribed a muscle relaxant which helped with her pain. However, about a week ago, she tried to stop using her muscle relaxer and began to start experiencing pain in the lower back that would radiate around to the abdomen. She made an appointment with Dr. [facility] for next Tuesday, but the pain became so bad that she had to come to the ED. Patient denies leg pain, saddle anesthesia, bowel or bladder incontinence, numbness, tingling, weakness, or any other orthopaedic complaint. Patient seen now lying bed resting comfortably. On MRI Patient found to have an acute vertebral compression fx of T12        PAST MEDICAL & SURGICAL HISTORY:  Seizures    Brain cancer    Breast cancer    Melanoma    History of hip replacement          MEDICATIONS  (STANDING):  acetaminophen     Tablet .. 650 milliGRAM(s) Oral every 6 hours  carBAMazepine 200 milliGRAM(s) Oral two times a day  diVALproex  milliGRAM(s) Oral two times a day  heparin   Injectable 5000 Unit(s) SubCutaneous every 8 hours  influenza  Vaccine (HIGH DOSE) 0.7 milliLiter(s) IntraMuscular once  pantoprazole    Tablet 40 milliGRAM(s) Oral before breakfast    MEDICATIONS  (PRN):  magnesium hydroxide Suspension 30 milliLiter(s) Oral daily PRN Constipation  methocarbamol 750 milliGRAM(s) Oral three times a day PRN Muscle Spasm  ondansetron Injectable 4 milliGRAM(s) IV Push every 8 hours PRN Nausea and/or Vomiting  oxyCODONE    IR 10 milliGRAM(s) Oral every 4 hours PRN Severe Pain (7 - 10)  oxyCODONE    IR 5 milliGRAM(s) Oral every 4 hours PRN Moderate Pain (4 - 6)  traMADol 50 milliGRAM(s) Oral every 6 hours PRN Mild Pain (1 - 3)    Social Hx:  Tobacco: neg  ETOH: neg  Drugs: neg    Family Hx:  As per my conversation with the patient, non contributory      ROS  CONSTITUTIONAL: No weakness, fevers or chills  EYES/ENT: No visual changes;  No vertigo or throat pain   NECK: No pain or stiffness  RESPIRATORY: No cough, wheezing, hemoptysis; No shortness of breath  CARDIOVASCULAR: No chest pain or palpitations  GASTROINTESTINAL: No abdominal or epigastric pain. No nausea, vomiting, or hematemesis; No diarrhea or constipation. No melena or hematochezia.  GENITOURINARY: No dysuria, frequency or hematuria  NEUROLOGICAL: No numbness or weakness  SKIN: No itching, burning, rashes, or lesions   MUSCULOSKELETAL: back pain    INTERVAL HPI/OVERNIGHT EVENTS:  T(C): 36.7 (10-28-21 @ 09:20), Max: 36.7 (10-27-21 @ 21:51)  HR: 74 (10-28-21 @ 09:20) (74 - 84)  BP: 112/62 (10-28-21 @ 09:20) (112/62 - 141/83)  RR: 14 (10-28-21 @ 09:20) (14 - 18)  SpO2: 97% (10-28-21 @ 09:20) (95% - 98%)  Wt(kg): --  I&O's Summary    27 Oct 2021 07:01  -  28 Oct 2021 07:00  --------------------------------------------------------  IN: 480 mL / OUT: 400 mL / NET: 80 mL        PHYSICAL EXAM:  GENERAL: NAD, well-groomed, well-developed  HEAD:  Atraumatic, Normocephalic  EYES: EOMI, PERRLA, conjunctiva and sclera clear  ENMT: No tonsillar erythema, exudates, or enlargement; Moist mucous membranes, Good dentition, No lesions  NECK: Supple, No JVD, Normal thyroid  NERVOUS SYSTEM:  Alert & Oriented X3, Good concentration; Motor Strength 5/5 B/L upper and lower extremities; DTRs 2+ intact and symmetric  CHEST/LUNG: Clear to percussion bilaterally; No rales, rhonchi, wheezing, or rubs  HEART: Regular rate and rhythm; No murmurs, rubs, or gallops  ABDOMEN: Soft, Nontender, Nondistended; Bowel sounds present  EXTREMITIES:  2+ Peripheral Pulses, No clubbing, cyanosis, or edema  LYMPH: No lymphadenopathy noted  SKIN: No rashes or lesions        LABS:                        11.9   8.49  )-----------( 498      ( 28 Oct 2021 06:30 )             36.4     10-28    135  |  99  |  13  ----------------------------<  92  4.0   |  24  |  0.56    Ca    9.1      28 Oct 2021 06:30          CAPILLARY BLOOD GLUCOSE                Radiology reports:

## 2021-10-28 NOTE — CONSULT NOTE ADULT - ASSESSMENT
66F with Nondisplaced Subacute left L1 & L2 TP fractures, acute VCF T12, and a 3 column injury at T11-12    Plan:   Patient transferred from [place] to [facility] under Dr Prater service  Exam unchanged after transfer  FU TLSO brace   Patient is bed rest until TLSO brace has arrived  Once TLSO arrives, then will plan to have patient up with PT   Will monitor exam  Pain control  Discussed with Dr Prater who is aware and agrees with the above    
67 yo woman presents with complaint of back pain after a fall three weeks ago. found to have a vertebral compression fracture

## 2021-10-29 LAB
COVID-19 SPIKE DOMAIN AB INTERP: POSITIVE
COVID-19 SPIKE DOMAIN ANTIBODY RESULT: 89.6 U/ML — HIGH
SARS-COV-2 IGG+IGM SERPL QL IA: 89.6 U/ML — HIGH
SARS-COV-2 IGG+IGM SERPL QL IA: POSITIVE

## 2021-10-29 PROCEDURE — 99231 SBSQ HOSP IP/OBS SF/LOW 25: CPT | Mod: GC

## 2021-10-29 RX ORDER — ONDANSETRON 8 MG/1
4 TABLET, FILM COATED ORAL ONCE
Refills: 0 | Status: COMPLETED | OUTPATIENT
Start: 2021-10-29 | End: 2021-10-29

## 2021-10-29 RX ADMIN — Medication 325 MILLIGRAM(S): at 05:58

## 2021-10-29 RX ADMIN — Medication 200 MILLIGRAM(S): at 05:56

## 2021-10-29 RX ADMIN — Medication 650 MILLIGRAM(S): at 12:09

## 2021-10-29 RX ADMIN — DIVALPROEX SODIUM 250 MILLIGRAM(S): 500 TABLET, DELAYED RELEASE ORAL at 17:54

## 2021-10-29 RX ADMIN — HEPARIN SODIUM 5000 UNIT(S): 5000 INJECTION INTRAVENOUS; SUBCUTANEOUS at 22:07

## 2021-10-29 RX ADMIN — HEPARIN SODIUM 5000 UNIT(S): 5000 INJECTION INTRAVENOUS; SUBCUTANEOUS at 13:33

## 2021-10-29 RX ADMIN — Medication 200 MILLIGRAM(S): at 17:54

## 2021-10-29 RX ADMIN — Medication 650 MILLIGRAM(S): at 17:54

## 2021-10-29 RX ADMIN — ONDANSETRON 4 MILLIGRAM(S): 8 TABLET, FILM COATED ORAL at 13:34

## 2021-10-29 RX ADMIN — Medication 650 MILLIGRAM(S): at 06:28

## 2021-10-29 RX ADMIN — PANTOPRAZOLE SODIUM 40 MILLIGRAM(S): 20 TABLET, DELAYED RELEASE ORAL at 05:56

## 2021-10-29 RX ADMIN — HEPARIN SODIUM 5000 UNIT(S): 5000 INJECTION INTRAVENOUS; SUBCUTANEOUS at 05:56

## 2021-10-29 RX ADMIN — DIVALPROEX SODIUM 250 MILLIGRAM(S): 500 TABLET, DELAYED RELEASE ORAL at 05:56

## 2021-10-29 NOTE — CHART NOTE - NSCHARTNOTEFT_GEN_A_CORE
I followed up with Rossana this morning. P.T had donned the TLSO and had the patient OOB sitting in the chair. The TLSO fit well and only needed the Axilla straps to be adjusted and shortened. All went without incident.   Bhavesh MCDOWELL  [facility]  [phone]

## 2021-10-29 NOTE — PROGRESS NOTE ADULT - SUBJECTIVE AND OBJECTIVE BOX
66F hx breast CA in remission, benign brain CA S/p excision, skin CA, seizure presented to [facility] ED with back pain, transferred to [facility] per family request. Patient is a community ambulator with cane and lives with her son and daughter in law. Patient notes that about three weeks ago, she was walking in her home when she tripped over some power cords and landed on her back. She noted immediate back pain but was able to continue walking and performing ADLs. She went to seek medical attention a few days after and was prescribed a muscle relaxant which helped with her pain. However, about a week ago, she tried to stop using her muscle relaxer and began to start experiencing pain in the lower back that would radiate around to the abdomen. She made an appointment with Dr. [facility] for next Tuesday, but the pain became so bad that she had to come to the ED. Patient denies leg pain, saddle anesthesia, bowel or bladder incontinence, numbness, tingling, weakness, or any other orthopaedic complaint. Patient seen now lying bed resting comfortably. On MRI Patient found to have unstable 3 column injury to the spine injury associated with a vertebral compression fracture at T12.    MEDICATIONS  (STANDING):  acetaminophen     Tablet .. 650 milliGRAM(s) Oral every 6 hours  carBAMazepine 200 milliGRAM(s) Oral two times a day  diVALproex  milliGRAM(s) Oral two times a day  heparin   Injectable 5000 Unit(s) SubCutaneous every 8 hours  influenza  Vaccine (HIGH DOSE) 0.7 milliLiter(s) IntraMuscular once  ondansetron    Tablet 4 milliGRAM(s) Oral once  pantoprazole    Tablet 40 milliGRAM(s) Oral before breakfast    MEDICATIONS  (PRN):  bisacodyl Suppository 10 milliGRAM(s) Rectal daily PRN If no bowel movement by POD#2  magnesium hydroxide Suspension 30 milliLiter(s) Oral daily PRN Constipation  methocarbamol 750 milliGRAM(s) Oral three times a day PRN Muscle Spasm  ondansetron Injectable 4 milliGRAM(s) IV Push every 8 hours PRN Nausea and/or Vomiting  oxyCODONE    IR 10 milliGRAM(s) Oral every 4 hours PRN Severe Pain (7 - 10)  oxyCODONE    IR 5 milliGRAM(s) Oral every 4 hours PRN Moderate Pain (4 - 6)  traMADol 50 milliGRAM(s) Oral every 6 hours PRN Mild Pain (1 - 3)          VITALS:   T(C): 36.6 (10-29-21 @ 08:56), Max: 37.1 (10-28-21 @ 17:02)  HR: 98 (10-29-21 @ 08:56) (78 - 98)  BP: 115/68 (10-29-21 @ 08:56) (115/68 - 150/81)  RR: 17 (10-29-21 @ 08:56) (17 - 17)  SpO2: 98% (10-29-21 @ 08:56) (97% - 99%)  Wt(kg): --    PHYSICAL EXAM:  GENERAL: NAD, well-groomed, well-developed  HEAD:  Atraumatic, Normocephalic  EYES: EOMI, PERRLA, conjunctiva and sclera clear  ENMT: No tonsillar erythema, exudates, or enlargement; Moist mucous membranes, Good dentition, No lesions  NECK: Supple, No JVD, Normal thyroid  NERVOUS SYSTEM:  Alert & Oriented X3, Good concentration; Motor Strength 5/5 B/L upper and lower extremities; DTRs 2+ intact and symmetric  CHEST/LUNG: Clear to percussion bilaterally; No rales, rhonchi, wheezing, or rubs  HEART: Regular rate and rhythm; No murmurs, rubs, or gallops  ABDOMEN: Soft, Nontender, Nondistended; Bowel sounds present  EXTREMITIES:  2+ Peripheral Pulses, No clubbing, cyanosis, or edema  LYMPH: No lymphadenopathy noted  SKIN: No rashes or lesions    LABS:        CBC Full  -  ( 28 Oct 2021 06:30 )  WBC Count : 8.49 K/uL  RBC Count : 4.15 M/uL  Hemoglobin : 11.9 g/dL  Hematocrit : 36.4 %  Platelet Count - Automated : 498 K/uL  Mean Cell Volume : 87.7 fl  Mean Cell Hemoglobin : 28.7 pg  Mean Cell Hemoglobin Concentration : 32.7 gm/dL  Auto Neutrophil # : x  Auto Lymphocyte # : x  Auto Monocyte # : x  Auto Eosinophil # : x  Auto Basophil # : x  Auto Neutrophil % : x  Auto Lymphocyte % : x  Auto Monocyte % : x  Auto Eosinophil % : x  Auto Basophil % : x    10-28    135  |  99  |  13  ----------------------------<  92  4.0   |  24  |  0.56    Ca    9.1      28 Oct 2021 06:30            CAPILLARY BLOOD GLUCOSE          RADIOLOGY & ADDITIONAL TESTS:

## 2021-10-29 NOTE — PROGRESS NOTE ADULT - SUBJECTIVE AND OBJECTIVE BOX
Pt seen and examined at bedside. Emesis x1 overnight. Pain well controlled. Denies numbness, weakness or tingling. Denies saddle anesthesia or bladder/bowel incontinence. Denies SOB, CP, fever or chills.    Vital Signs Last 24 Hrs  T(C): 36.7 (29 Oct 2021 04:51), Max: 37.1 (28 Oct 2021 17:02)  T(F): 98 (29 Oct 2021 04:51), Max: 98.7 (28 Oct 2021 17:02)  HR: 91 (29 Oct 2021 04:51) (74 - 91)  BP: 150/81 (29 Oct 2021 04:51) (112/62 - 150/81)  BP(mean): --  RR: 17 (29 Oct 2021 04:51) (14 - 17)  SpO2: 99% (29 Oct 2021 04:51) (97% - 99%)       PHYSICAL EXAM  GEN: NAD, AAOx3.   SPINE:  Motor:                          Deltoid       Biceps      Triceps      Wrist Ext      Finger Flex    Finger Abduction   RIGHT             5/5             5/5             5/5             5/5                 5/5                     5/5  LEFT                5/5             5/5             5/5             5/5                 5/5                     5/5    SILT C5-T1.   + Radial Pulse.     LE                        Hip Flex       Knee Ext        Knee Flex     Dorsiflex      Hallux Ext        PlantarFlex  RIGHT            5/5                5/5                 5/5               5/5                 5/5                    5/5  LEFT               5/5                5/5                 5/5               5/5                 5/5                    5/5    SILT L2-S1.   + DP/PT Pulses.   Compartments soft and compressible.   Calves non-TTP bilaterally.         ASSESSMENT:  66F with unstable 3 column injury to the spine injury associated with VCF at T12.     PLAN:  - Pain control.   - DVT PPX: heparin.   - Ambulate w/ PT only w/ TSLO brace (TLSO currently at bedside)  - Plan for CT spine on 10/30 or 10/31  - Possible OR next week  - Will obtain medical clearance  - Will obtain consent

## 2021-10-30 LAB
ANION GAP SERPL CALC-SCNC: 13 MMOL/L — SIGNIFICANT CHANGE UP (ref 5–17)
APTT BLD: 30.2 SEC — SIGNIFICANT CHANGE UP (ref 27.5–35.5)
BUN SERPL-MCNC: 15 MG/DL — SIGNIFICANT CHANGE UP (ref 7–23)
CALCIUM SERPL-MCNC: 8.8 MG/DL — SIGNIFICANT CHANGE UP (ref 8.4–10.5)
CHLORIDE SERPL-SCNC: 99 MMOL/L — SIGNIFICANT CHANGE UP (ref 96–108)
CO2 SERPL-SCNC: 24 MMOL/L — SIGNIFICANT CHANGE UP (ref 22–31)
CREAT SERPL-MCNC: 0.52 MG/DL — SIGNIFICANT CHANGE UP (ref 0.5–1.3)
GLUCOSE SERPL-MCNC: 81 MG/DL — SIGNIFICANT CHANGE UP (ref 70–99)
HCT VFR BLD CALC: 37.9 % — SIGNIFICANT CHANGE UP (ref 34.5–45)
HGB BLD-MCNC: 12.1 G/DL — SIGNIFICANT CHANGE UP (ref 11.5–15.5)
INR BLD: 1 RATIO — SIGNIFICANT CHANGE UP (ref 0.88–1.16)
MCHC RBC-ENTMCNC: 28.6 PG — SIGNIFICANT CHANGE UP (ref 27–34)
MCHC RBC-ENTMCNC: 31.9 GM/DL — LOW (ref 32–36)
MCV RBC AUTO: 89.6 FL — SIGNIFICANT CHANGE UP (ref 80–100)
NRBC # BLD: 0 /100 WBCS — SIGNIFICANT CHANGE UP (ref 0–0)
PLATELET # BLD AUTO: 563 K/UL — HIGH (ref 150–400)
POTASSIUM SERPL-MCNC: 4 MMOL/L — SIGNIFICANT CHANGE UP (ref 3.5–5.3)
POTASSIUM SERPL-SCNC: 4 MMOL/L — SIGNIFICANT CHANGE UP (ref 3.5–5.3)
PROTHROM AB SERPL-ACNC: 12 SEC — SIGNIFICANT CHANGE UP (ref 10.6–13.6)
RBC # BLD: 4.23 M/UL — SIGNIFICANT CHANGE UP (ref 3.8–5.2)
RBC # FLD: 12.3 % — SIGNIFICANT CHANGE UP (ref 10.3–14.5)
SODIUM SERPL-SCNC: 136 MMOL/L — SIGNIFICANT CHANGE UP (ref 135–145)
WBC # BLD: 10.29 K/UL — SIGNIFICANT CHANGE UP (ref 3.8–10.5)
WBC # FLD AUTO: 10.29 K/UL — SIGNIFICANT CHANGE UP (ref 3.8–10.5)

## 2021-10-30 PROCEDURE — 72131 CT LUMBAR SPINE W/O DYE: CPT | Mod: 26

## 2021-10-30 PROCEDURE — 72128 CT CHEST SPINE W/O DYE: CPT | Mod: 26

## 2021-10-30 PROCEDURE — 99231 SBSQ HOSP IP/OBS SF/LOW 25: CPT

## 2021-10-30 RX ADMIN — HEPARIN SODIUM 5000 UNIT(S): 5000 INJECTION INTRAVENOUS; SUBCUTANEOUS at 05:36

## 2021-10-30 RX ADMIN — HEPARIN SODIUM 5000 UNIT(S): 5000 INJECTION INTRAVENOUS; SUBCUTANEOUS at 13:34

## 2021-10-30 RX ADMIN — Medication 650 MILLIGRAM(S): at 05:38

## 2021-10-30 RX ADMIN — Medication 200 MILLIGRAM(S): at 05:36

## 2021-10-30 RX ADMIN — PANTOPRAZOLE SODIUM 40 MILLIGRAM(S): 20 TABLET, DELAYED RELEASE ORAL at 05:36

## 2021-10-30 RX ADMIN — DIVALPROEX SODIUM 250 MILLIGRAM(S): 500 TABLET, DELAYED RELEASE ORAL at 05:36

## 2021-10-30 RX ADMIN — ONDANSETRON 4 MILLIGRAM(S): 8 TABLET, FILM COATED ORAL at 05:35

## 2021-10-30 RX ADMIN — DIVALPROEX SODIUM 250 MILLIGRAM(S): 500 TABLET, DELAYED RELEASE ORAL at 17:36

## 2021-10-30 RX ADMIN — Medication 200 MILLIGRAM(S): at 17:35

## 2021-10-30 RX ADMIN — Medication 650 MILLIGRAM(S): at 06:30

## 2021-10-30 RX ADMIN — Medication 650 MILLIGRAM(S): at 11:47

## 2021-10-30 RX ADMIN — HEPARIN SODIUM 5000 UNIT(S): 5000 INJECTION INTRAVENOUS; SUBCUTANEOUS at 22:44

## 2021-10-30 RX ADMIN — Medication 650 MILLIGRAM(S): at 17:36

## 2021-10-30 RX ADMIN — Medication 650 MILLIGRAM(S): at 18:17

## 2021-10-30 RX ADMIN — Medication 650 MILLIGRAM(S): at 12:22

## 2021-10-30 NOTE — PROGRESS NOTE ADULT - SUBJECTIVE AND OBJECTIVE BOX
66F hx breast CA in remission, benign brain CA S/p excision, skin CA, seizure presented to [facility] ED with back pain, transferred to [facility] per family request. Patient is a community ambulator with cane and lives with her son and daughter in law. Patient notes that about three weeks ago, she was walking in her home when she tripped over some power cords and landed on her back. She noted immediate back pain but was able to continue walking and performing ADLs. She went to seek medical attention a few days after and was prescribed a muscle relaxant which helped with her pain. However, about a week ago, she tried to stop using her muscle relaxer and began to start experiencing pain in the lower back that would radiate around to the abdomen. She made an appointment with Dr. [facility] for next Tuesday, but the pain became so bad that she had to come to the ED. Patient denies leg pain, saddle anesthesia, bowel or bladder incontinence, numbness, tingling, weakness, or any other orthopaedic complaint. Patient seen now lying bed resting comfortably. On MRI Patient found to have unstable 3 column injury to the spine injury associated with a vertebral compression fracture at T12. Patient has been wearing a TLSO brace and states pain has been controlled      MEDICATIONS  (STANDING):  acetaminophen     Tablet .. 650 milliGRAM(s) Oral every 6 hours  carBAMazepine 200 milliGRAM(s) Oral two times a day  diVALproex  milliGRAM(s) Oral two times a day  heparin   Injectable 5000 Unit(s) SubCutaneous every 8 hours  influenza  Vaccine (HIGH DOSE) 0.7 milliLiter(s) IntraMuscular once  pantoprazole    Tablet 40 milliGRAM(s) Oral before breakfast    MEDICATIONS  (PRN):  bisacodyl Suppository 10 milliGRAM(s) Rectal daily PRN If no bowel movement by POD#2  magnesium hydroxide Suspension 30 milliLiter(s) Oral daily PRN Constipation  methocarbamol 750 milliGRAM(s) Oral three times a day PRN Muscle Spasm  ondansetron Injectable 4 milliGRAM(s) IV Push every 8 hours PRN Nausea and/or Vomiting  oxyCODONE    IR 10 milliGRAM(s) Oral every 4 hours PRN Severe Pain (7 - 10)  oxyCODONE    IR 5 milliGRAM(s) Oral every 4 hours PRN Moderate Pain (4 - 6)  traMADol 50 milliGRAM(s) Oral every 6 hours PRN Mild Pain (1 - 3)          VITALS:   T(C): 36.7 (10-30-21 @ 16:28), Max: 36.8 (10-29-21 @ 21:10)  HR: 88 (10-30-21 @ 16:28) (80 - 105)  BP: 104/60 (10-30-21 @ 16:28) (104/60 - 129/78)  RR: 18 (10-30-21 @ 16:28) (18 - 18)  SpO2: 99% (10-30-21 @ 16:28) (97% - 100%)  Wt(kg): --    PHYSICAL EXAM:  GENERAL: NAD, well-groomed, well-developed  HEAD:  Atraumatic, Normocephalic  EYES: EOMI, PERRLA, conjunctiva and sclera clear  ENMT: No tonsillar erythema, exudates, or enlargement; Moist mucous membranes, Good dentition, No lesions  NECK: Supple, No JVD, Normal thyroid  NERVOUS SYSTEM:  Alert & Oriented X3, Good concentration; Motor Strength 5/5 B/L upper and lower extremities; DTRs 2+ intact and symmetric  CHEST/LUNG: Clear to percussion bilaterally; No rales, rhonchi, wheezing, or rubs  HEART: Regular rate and rhythm; No murmurs, rubs, or gallops  ABDOMEN: Soft, Nontender, Nondistended; Bowel sounds present  EXTREMITIES:  2+ Peripheral Pulses, No clubbing, cyanosis, or edema  LYMPH: No lymphadenopathy noted  SKIN: No rashes or lesions    LABS:        CBC Full  -  ( 30 Oct 2021 06:57 )  WBC Count : 10.29 K/uL  RBC Count : 4.23 M/uL  Hemoglobin : 12.1 g/dL  Hematocrit : 37.9 %  Platelet Count - Automated : 563 K/uL  Mean Cell Volume : 89.6 fl  Mean Cell Hemoglobin : 28.6 pg  Mean Cell Hemoglobin Concentration : 31.9 gm/dL  Auto Neutrophil # : x  Auto Lymphocyte # : x  Auto Monocyte # : x  Auto Eosinophil # : x  Auto Basophil # : x  Auto Neutrophil % : x  Auto Lymphocyte % : x  Auto Monocyte % : x  Auto Eosinophil % : x  Auto Basophil % : x    10-30    136  |  99  |  15  ----------------------------<  81  4.0   |  24  |  0.52    Ca    8.8      30 Oct 2021 06:57        PT/INR - ( 30 Oct 2021 07:09 )   PT: 12.0 sec;   INR: 1.00 ratio         PTT - ( 30 Oct 2021 07:09 )  PTT:30.2 sec    CAPILLARY BLOOD GLUCOSE          RADIOLOGY & ADDITIONAL TESTS:

## 2021-10-30 NOTE — OCCUPATIONAL THERAPY INITIAL EVALUATION ADULT - PRECAUTIONS/LIMITATIONS, REHAB EVAL
She noted immediate back pain but was able to continue walking and performing ADLs. She went to seek medical attention a few days after and was prescribed a muscle relaxant which helped with her pain. However, about a week ago, she tried to stop using her muscle relaxer and began to start experiencing pain in the lower back that would radiate around to the abdomen. She made an appointment with Dr. [facility] for next Tuesday, but the pain became so bad that she had to come to the ED. Patient denies leg pain, saddle anesthesia, bowel or bladder incontinence, numbness, tingling, weakness, or any other orthopaedic complaint./spinal precautions She noted immediate back pain but was able to continue walking and performing ADLs. She went to seek medical attention a few days after and was prescribed a muscle relaxant which helped with her pain. However, about a week ago, she tried to stop using her muscle relaxer and began to start experiencing pain in the lower back that would radiate around to the abdomen. She made an appointment with Dr. Link [facility] for next Tuesday, but the pain became so bad that she had to come to the ED. Patient denies leg pain, saddle anesthesia, bowel or bladder incontinence, numbness, tingling, weakness, or any other orthopaedic complaint./fall precautions/spinal precautions

## 2021-10-30 NOTE — PROGRESS NOTE ADULT - SUBJECTIVE AND OBJECTIVE BOX
ORTHO  Patient is a 66y old  Female who presents with a chief complaint of Intractable mid and lower back pain. (29 Oct 2021 06:39)    Pt. resting without complaint    VS-  T(C): 36.3 (10-30-21 @ 05:13), Max: 36.8 (10-29-21 @ 21:10)  HR: 94 (10-30-21 @ 05:13) (80 - 98)  BP: 120/73 (10-30-21 @ 05:13) (107/69 - 129/78)  RR: 18 (10-30-21 @ 05:13) (17 - 18)  SpO2: 98% (10-30-21 @ 05:13) (95% - 100%)  Wt(kg): --    M.S. A&O  Lower back min tenderness in bed  Neuro-              Motor- AROM (+) Ankle, EHL 5/5              Sensation- grossly intact to light touch              Calves- soft, nontender- PAS                               12.1   10.29 )-----------( 563      ( 30 Oct 2021 06:57 )             37.9

## 2021-10-31 RX ADMIN — PANTOPRAZOLE SODIUM 40 MILLIGRAM(S): 20 TABLET, DELAYED RELEASE ORAL at 05:18

## 2021-10-31 RX ADMIN — TRAMADOL HYDROCHLORIDE 50 MILLIGRAM(S): 50 TABLET ORAL at 14:22

## 2021-10-31 RX ADMIN — Medication 200 MILLIGRAM(S): at 17:41

## 2021-10-31 RX ADMIN — HEPARIN SODIUM 5000 UNIT(S): 5000 INJECTION INTRAVENOUS; SUBCUTANEOUS at 21:23

## 2021-10-31 RX ADMIN — Medication 650 MILLIGRAM(S): at 06:30

## 2021-10-31 RX ADMIN — Medication 650 MILLIGRAM(S): at 05:18

## 2021-10-31 RX ADMIN — HEPARIN SODIUM 5000 UNIT(S): 5000 INJECTION INTRAVENOUS; SUBCUTANEOUS at 05:19

## 2021-10-31 RX ADMIN — Medication 650 MILLIGRAM(S): at 17:41

## 2021-10-31 RX ADMIN — Medication 650 MILLIGRAM(S): at 13:18

## 2021-10-31 RX ADMIN — Medication 650 MILLIGRAM(S): at 13:51

## 2021-10-31 RX ADMIN — TRAMADOL HYDROCHLORIDE 50 MILLIGRAM(S): 50 TABLET ORAL at 14:55

## 2021-10-31 RX ADMIN — HEPARIN SODIUM 5000 UNIT(S): 5000 INJECTION INTRAVENOUS; SUBCUTANEOUS at 14:22

## 2021-10-31 RX ADMIN — Medication 650 MILLIGRAM(S): at 18:11

## 2021-10-31 RX ADMIN — ONDANSETRON 4 MILLIGRAM(S): 8 TABLET, FILM COATED ORAL at 11:52

## 2021-10-31 RX ADMIN — Medication 200 MILLIGRAM(S): at 05:19

## 2021-10-31 RX ADMIN — DIVALPROEX SODIUM 250 MILLIGRAM(S): 500 TABLET, DELAYED RELEASE ORAL at 17:42

## 2021-10-31 RX ADMIN — DIVALPROEX SODIUM 250 MILLIGRAM(S): 500 TABLET, DELAYED RELEASE ORAL at 05:17

## 2021-10-31 NOTE — PROGRESS NOTE ADULT - SUBJECTIVE AND OBJECTIVE BOX
66F hx breast CA in remission, benign brain CA S/p excision, skin CA, seizure presented to [facility] ED with back pain, transferred to [facility] per family request. Patient is a community ambulator with cane and lives with her son and daughter in law. Patient notes that about three weeks ago, she was walking in her home when she tripped over some power cords and landed on her back. She noted immediate back pain but was able to continue walking and performing ADLs. She went to seek medical attention a few days after and was prescribed a muscle relaxant which helped with her pain. However, about a week ago, she tried to stop using her muscle relaxer and began to start experiencing pain in the lower back that would radiate around to the abdomen. She made an appointment with Dr. Link [facility] for next Tuesday, but the pain became so bad that she had to come to the ED. Patient denies leg pain, saddle anesthesia, bowel or bladder incontinence, numbness, tingling, weakness, or any other orthopaedic complaint. Patient seen now lying bed resting comfortably. On MRI Patient found to have unstable 3 column injury to the spine injury associated with a vertebral compression fracture at T12. Patient has been wearing a TLSO brace and states pain has been controlled. Has started working with physical therapy       MEDICATIONS  (STANDING):  acetaminophen     Tablet .. 650 milliGRAM(s) Oral every 6 hours  carBAMazepine 200 milliGRAM(s) Oral two times a day  diVALproex  milliGRAM(s) Oral two times a day  heparin   Injectable 5000 Unit(s) SubCutaneous every 8 hours  influenza  Vaccine (HIGH DOSE) 0.7 milliLiter(s) IntraMuscular once  pantoprazole    Tablet 40 milliGRAM(s) Oral before breakfast    MEDICATIONS  (PRN):  bisacodyl Suppository 10 milliGRAM(s) Rectal daily PRN If no bowel movement by POD#2  magnesium hydroxide Suspension 30 milliLiter(s) Oral daily PRN Constipation  methocarbamol 750 milliGRAM(s) Oral three times a day PRN Muscle Spasm  ondansetron Injectable 4 milliGRAM(s) IV Push every 8 hours PRN Nausea and/or Vomiting  oxyCODONE    IR 10 milliGRAM(s) Oral every 4 hours PRN Severe Pain (7 - 10)  oxyCODONE    IR 5 milliGRAM(s) Oral every 4 hours PRN Moderate Pain (4 - 6)  traMADol 50 milliGRAM(s) Oral every 6 hours PRN Mild Pain (1 - 3)          VITALS:   T(C): 36.4 (10-31-21 @ 08:20), Max: 36.9 (10-31-21 @ 05:13)  HR: 91 (10-31-21 @ 08:20) (81 - 105)  BP: 122/83 (10-31-21 @ 08:20) (104/60 - 137/80)  RR: 18 (10-31-21 @ 08:20) (17 - 18)  SpO2: 97% (10-31-21 @ 08:20) (97% - 100%)  Wt(kg): --    PHYSICAL EXAM:  GENERAL: NAD, well-groomed, well-developed  HEAD:  Atraumatic, Normocephalic  EYES: EOMI, PERRLA, conjunctiva and sclera clear  ENMT: No tonsillar erythema, exudates, or enlargement; Moist mucous membranes, Good dentition, No lesions  NECK: Supple, No JVD, Normal thyroid  NERVOUS SYSTEM:  Alert & Oriented X3, Good concentration; Motor Strength 5/5 B/L upper and lower extremities; DTRs 2+ intact and symmetric  CHEST/LUNG: Clear to percussion bilaterally; No rales, rhonchi, wheezing, or rubs  HEART: Regular rate and rhythm; No murmurs, rubs, or gallops  ABDOMEN: Soft, Nontender, Nondistended; Bowel sounds present  EXTREMITIES:  2+ Peripheral Pulses, No clubbing, cyanosis, or edema  LYMPH: No lymphadenopathy noted  SKIN: No rashes or lesions    LABS:        CBC Full  -  ( 30 Oct 2021 06:57 )  WBC Count : 10.29 K/uL  RBC Count : 4.23 M/uL  Hemoglobin : 12.1 g/dL  Hematocrit : 37.9 %  Platelet Count - Automated : 563 K/uL  Mean Cell Volume : 89.6 fl  Mean Cell Hemoglobin : 28.6 pg  Mean Cell Hemoglobin Concentration : 31.9 gm/dL  Auto Neutrophil # : x  Auto Lymphocyte # : x  Auto Monocyte # : x  Auto Eosinophil # : x  Auto Basophil # : x  Auto Neutrophil % : x  Auto Lymphocyte % : x  Auto Monocyte % : x  Auto Eosinophil % : x  Auto Basophil % : x    10-30    136  |  99  |  15  ----------------------------<  81  4.0   |  24  |  0.52    Ca    8.8      30 Oct 2021 06:57        PT/INR - ( 30 Oct 2021 07:09 )   PT: 12.0 sec;   INR: 1.00 ratio         PTT - ( 30 Oct 2021 07:09 )  PTT:30.2 sec    CAPILLARY BLOOD GLUCOSE          RADIOLOGY & ADDITIONAL TESTS:

## 2021-10-31 NOTE — PROGRESS NOTE ADULT - SUBJECTIVE AND OBJECTIVE BOX
Patient Resting without complaints / events overnight.    Exam:   Alert/Oriented, No Acute Distress  Mild TTP over thoracic area         Motor exam: [  ]          [ ] Upper extremity                      Bi          Tri        Delt                                                    R         5/5        5/5        5/5                                               L          5/5        5/5        5/5                  [ ] Lower extremity            PF          DF         EHL       FHL                                                                                            R        5/5        5/5        5/5       5/5                                                        L         5/5        5/5        5/5       5/5                   Sensation: [x] intact to light touch  [ ] decreased                                             Calves Soft/Non-tender bilaterally          Toes warm well perfused; capillary refill <3 seconds              LABS:                        12.1   10.29 )-----------( 563      ( 30 Oct 2021 06:57 )             37.9     10-30    136  |  99  |  15  ----------------------------<  81  4.0   |  24  |  0.52    Ca    8.8      30 Oct 2021 06:57          RADIOLOGY & ADDITIONAL STUDIES:

## 2021-11-01 ENCOUNTER — TRANSCRIPTION ENCOUNTER (OUTPATIENT)
Age: 66
End: 2021-11-01

## 2021-11-01 LAB — SARS-COV-2 RNA SPEC QL NAA+PROBE: SIGNIFICANT CHANGE UP

## 2021-11-01 PROCEDURE — 99233 SBSQ HOSP IP/OBS HIGH 50: CPT

## 2021-11-01 RX ORDER — TRAMADOL HYDROCHLORIDE 50 MG/1
1 TABLET ORAL
Qty: 0 | Refills: 0 | DISCHARGE
Start: 2021-11-01

## 2021-11-01 RX ORDER — MAGNESIUM HYDROXIDE 400 MG/1
30 TABLET, CHEWABLE ORAL
Qty: 0 | Refills: 0 | DISCHARGE
Start: 2021-11-01

## 2021-11-01 RX ORDER — CARBAMAZEPINE 200 MG
1 TABLET ORAL
Qty: 0 | Refills: 0 | DISCHARGE
Start: 2021-11-01

## 2021-11-01 RX ORDER — OXYCODONE HYDROCHLORIDE 5 MG/1
1 TABLET ORAL
Qty: 0 | Refills: 0 | DISCHARGE
Start: 2021-11-01

## 2021-11-01 RX ORDER — METHOCARBAMOL 500 MG/1
1 TABLET, FILM COATED ORAL
Qty: 0 | Refills: 0 | DISCHARGE
Start: 2021-11-01

## 2021-11-01 RX ORDER — ACETAMINOPHEN 500 MG
2 TABLET ORAL
Qty: 0 | Refills: 0 | DISCHARGE
Start: 2021-11-01

## 2021-11-01 RX ORDER — PANTOPRAZOLE SODIUM 20 MG/1
1 TABLET, DELAYED RELEASE ORAL
Qty: 0 | Refills: 0 | DISCHARGE
Start: 2021-11-01

## 2021-11-01 RX ORDER — DIVALPROEX SODIUM 500 MG/1
0 TABLET, DELAYED RELEASE ORAL
Qty: 0 | Refills: 0 | DISCHARGE

## 2021-11-01 RX ORDER — CARBAMAZEPINE 200 MG
0 TABLET ORAL
Qty: 0 | Refills: 0 | DISCHARGE

## 2021-11-01 RX ORDER — DIVALPROEX SODIUM 500 MG/1
1 TABLET, DELAYED RELEASE ORAL
Qty: 0 | Refills: 0 | DISCHARGE
Start: 2021-11-01

## 2021-11-01 RX ADMIN — HEPARIN SODIUM 5000 UNIT(S): 5000 INJECTION INTRAVENOUS; SUBCUTANEOUS at 14:33

## 2021-11-01 RX ADMIN — DIVALPROEX SODIUM 250 MILLIGRAM(S): 500 TABLET, DELAYED RELEASE ORAL at 06:22

## 2021-11-01 RX ADMIN — Medication 650 MILLIGRAM(S): at 06:21

## 2021-11-01 RX ADMIN — Medication 200 MILLIGRAM(S): at 17:41

## 2021-11-01 RX ADMIN — DIVALPROEX SODIUM 250 MILLIGRAM(S): 500 TABLET, DELAYED RELEASE ORAL at 17:42

## 2021-11-01 RX ADMIN — Medication 650 MILLIGRAM(S): at 17:41

## 2021-11-01 RX ADMIN — HEPARIN SODIUM 5000 UNIT(S): 5000 INJECTION INTRAVENOUS; SUBCUTANEOUS at 22:30

## 2021-11-01 RX ADMIN — Medication 200 MILLIGRAM(S): at 06:22

## 2021-11-01 RX ADMIN — HEPARIN SODIUM 5000 UNIT(S): 5000 INJECTION INTRAVENOUS; SUBCUTANEOUS at 06:24

## 2021-11-01 RX ADMIN — PANTOPRAZOLE SODIUM 40 MILLIGRAM(S): 20 TABLET, DELAYED RELEASE ORAL at 06:24

## 2021-11-01 RX ADMIN — Medication 650 MILLIGRAM(S): at 11:44

## 2021-11-01 NOTE — DISCHARGE NOTE PROVIDER - CARE PROVIDER_API CALL
Chi Prater (MD)  [facility]  [address], [address]  [place] [address]  Phone: [phone]  Fax: [phone]  Follow Up Time:

## 2021-11-01 NOTE — DISCHARGE NOTE PROVIDER - NSDCMRMEDTOKEN_GEN_ALL_CORE_FT
acetaminophen 325 mg oral tablet: 2 tab(s) orally every 6 hours x 1 week then consider switching to prn  carBAMazepine 200 mg oral tablet: 1 tab(s) orally 2 times a day  Colace 100 mg oral capsule: 1 cap(s) orally 2 times a day   divalproex sodium 250 mg oral delayed release tablet: 1 tab(s) orally 2 times a day  magnesium hydroxide 8% oral suspension: 30 milliliter(s) orally once a day, As needed, Constipation  methocarbamol 750 mg oral tablet: 1 tab(s) orally 3 times a day, As needed, Muscle Spasm  oxyCODONE 10 mg oral tablet: 1 tab(s) orally every 4 hours, As needed, Severe Pain (7 - 10)  oxyCODONE 5 mg oral tablet: 1 tab(s) orally every 4 hours, As needed, Moderate Pain (4 - 6)  pantoprazole 40 mg oral delayed release tablet: 1 tab(s) orally once a day (before a meal)  TLSO Brace: ICD 10: R26.2  traMADol 50 mg oral tablet: 1 tab(s) orally every 6 hours, As needed, Mild Pain (1 - 3)  Xanax 0.25 mg oral tablet: 1 tab(s) orally 2 times a day, As Needed -for anxiety MDD:2 tabs   acetaminophen 325 mg oral tablet: 2 tab(s) orally every 6 hours x 1 week then consider switching to prn  carBAMazepine 200 mg oral tablet: 1 tab(s) orally 2 times a day  Colace 100 mg oral capsule: 1 cap(s) orally 2 times a day   divalproex sodium 250 mg oral delayed release tablet: 1 tab(s) orally 2 times a day  heparin: 5000 unit(s) subcutaneous every 8 hours  magnesium hydroxide 8% oral suspension: 30 milliliter(s) orally once a day, As needed, Constipation  methocarbamol 750 mg oral tablet: 1 tab(s) orally 3 times a day, As needed, Muscle Spasm  oxyCODONE 10 mg oral tablet: 1 tab(s) orally every 4 hours, As needed, Severe Pain (7 - 10)  oxyCODONE 5 mg oral tablet: 1 tab(s) orally every 4 hours, As needed, Moderate Pain (4 - 6)  pantoprazole 40 mg oral delayed release tablet: 1 tab(s) orally once a day (before a meal)  polyethylene glycol 3350 oral powder for reconstitution: 17 gram(s) orally once a day  senna oral tablet: 2 tab(s) orally once a day (at bedtime)  TLSO Brace: ICD 10: R26.2  traMADol 50 mg oral tablet: 1 tab(s) orally every 6 hours, As needed, Mild Pain (1 - 3)  Xanax 0.25 mg oral tablet: 1 tab(s) orally 2 times a day, As Needed -for anxiety MDD:2 tabs

## 2021-11-01 NOTE — PROGRESS NOTE ADULT - SUBJECTIVE AND OBJECTIVE BOX
66F hx breast CA in remission, benign brain CA S/p excision, skin CA, seizure presented to [facility] ED with back pain, transferred to [facility] per family request. Patient is a community ambulator with cane and lives with her son and daughter in law. Patient notes that about three weeks ago, she was walking in her home when she tripped over some power cords and landed on her back. She noted immediate back pain but was able to continue walking and performing ADLs. She went to seek medical attention a few days after and was prescribed a muscle relaxant which helped with her pain. However, about a week ago, she tried to stop using her muscle relaxer and began to start experiencing pain in the lower back that would radiate around to the abdomen. She made an appointment with Dr. [facility] for next Tuesday, but the pain became so bad that she had to come to the ED. Patient denies leg pain, saddle anesthesia, bowel or bladder incontinence, numbness, tingling, weakness, or any other orthopaedic complaint. Patient seen now lying bed resting comfortably. On MRI Patient found to have unstable 3 column injury to the spine injury associated with a vertebral compression fracture at T12. Patient has been wearing a TLSO brace and states pain has been controlled. Has started working with physical therapy. Patient states pain has been well controlled and is awaiting rehab placement     MEDICATIONS  (STANDING):  acetaminophen     Tablet .. 650 milliGRAM(s) Oral every 6 hours  carBAMazepine 200 milliGRAM(s) Oral two times a day  diVALproex  milliGRAM(s) Oral two times a day  heparin   Injectable 5000 Unit(s) SubCutaneous every 8 hours  influenza  Vaccine (HIGH DOSE) 0.7 milliLiter(s) IntraMuscular once  pantoprazole    Tablet 40 milliGRAM(s) Oral before breakfast    MEDICATIONS  (PRN):  bisacodyl Suppository 10 milliGRAM(s) Rectal daily PRN If no bowel movement by POD#2  magnesium hydroxide Suspension 30 milliLiter(s) Oral daily PRN Constipation  methocarbamol 750 milliGRAM(s) Oral three times a day PRN Muscle Spasm  ondansetron Injectable 4 milliGRAM(s) IV Push every 8 hours PRN Nausea and/or Vomiting  oxyCODONE    IR 10 milliGRAM(s) Oral every 4 hours PRN Severe Pain (7 - 10)  oxyCODONE    IR 5 milliGRAM(s) Oral every 4 hours PRN Moderate Pain (4 - 6)  traMADol 50 milliGRAM(s) Oral every 6 hours PRN Mild Pain (1 - 3)          VITALS:   T(C): 36.4 (11-01-21 @ 16:32), Max: 36.8 (11-01-21 @ 04:58)  HR: 81 (11-01-21 @ 16:32) (70 - 90)  BP: 107/65 (11-01-21 @ 16:32) (102/64 - 144/86)  RR: 18 (11-01-21 @ 16:32) (18 - 18)  SpO2: 98% (11-01-21 @ 16:32) (96% - 100%)  Wt(kg): --        PHYSICAL EXAM:  GENERAL: NAD, well-groomed, well-developed  HEAD:  Atraumatic, Normocephalic  EYES: EOMI, PERRLA, conjunctiva and sclera clear  ENMT: No tonsillar erythema, exudates, or enlargement; Moist mucous membranes, Good dentition, No lesions  NECK: decreased ROM  NERVOUS SYSTEM:  Alert & Oriented X3, Good concentration; Motor Strength 5/5 B/L upper and lower extremities; DTRs 2+ intact and symmetric  CHEST/LUNG: Clear to percussion bilaterally; No rales, rhonchi, wheezing, or rubs  HEART: Regular rate and rhythm; No murmurs, rubs, or gallops  ABDOMEN: Soft, Nontender, Nondistended; Bowel sounds present  EXTREMITIES:  2+ Peripheral Pulses, No clubbing, cyanosis, or edema  LYMPH: No lymphadenopathy noted  SKIN: No rashes or lesions  LABS:                      CAPILLARY BLOOD GLUCOSE          RADIOLOGY & ADDITIONAL TESTS:

## 2021-11-01 NOTE — PROGRESS NOTE ADULT - SUBJECTIVE AND OBJECTIVE BOX
Patient is a 66y old  Female who presents with a chief complaint of Vertebral compression fracture T12, with column injury T11-T12 (30 Oct 2021 07:16)         Patient comfortable  No complaints    T(C): 36.8 (11-01-21 @ 04:58), Max: 36.9 (10-31-21 @ 13:27)  HR: 70 (11-01-21 @ 04:58) (70 - 103)  BP: 106/67 (11-01-21 @ 04:58) (102/62 - 144/86)  RR: 18 (11-01-21 @ 04:58) (18 - 18)  SpO2: 97% (11-01-21 @ 04:58) (96% - 98%)  Wt(kg): --    PHYSICAL EXAM:  NAD, Alert  PE:   sensation grossly intact to light touch; (+) Distal Pulses; No Calf tenderness B/L, PAS              [ x] Lower extremeity                  PF          DF         EHL       FHL                                                                                            R        5/5        5/5        5/5       5/5                                                        L         5/5        5/5        5/5       5/5      LABS:                        12.1   10.29 )-----------( 563      ( 30 Oct 2021 06:57 )             37.9     10-30    136  |  99  |  15  ----------------------------<  81  4.0   |  24  |  0.52    Ca    8.8      30 Oct 2021 06:57      PT/INR - ( 30 Oct 2021 07:09 )   PT: 12.0 sec;   INR: 1.00 ratio         PTT - ( 30 Oct 2021 07:09 )  PTT:30.2 sec

## 2021-11-01 NOTE — DISCHARGE NOTE PROVIDER - HOSPITAL COURSE
History of Present Illness:   66F hx breast CA in remission, benign brain CA S/p excision, skin CA, seizure presented to [facility] ED with back pain, transferred to [facility] per family request. Patient is a community ambulator with cane and lives with her son and daughter in law. Patient notes that about three weeks ago, she was walking in her home when she tripped over some power cords and landed on her back. She noted immediate back pain but was able to continue walking and performing ADLs. She went to seek medical attention a few days after and was prescribed a muscle relaxant which helped with her pain. However, about a week ago, she tried to stop using her muscle relaxer and began to start experiencing pain in the lower back that would radiate around to the abdomen. She made an appointment with Dr. [facility] for next Tuesday, but the pain became so bad that she had to come to the ED. Patient denies leg pain, saddle anesthesia, bowel or bladder incontinence, numbness, tingling, weakness, or any other orthopaedic complaint.       Imaging: MRI imaging review from [facility] shows an acute VCF at T12 and a 3 column injury at T11-12.    Admitted to [facility] on 10/27/21 exam unchanged from [place].n: NAD, in bed comfortably  Spine:   Skin intact. No edema, erythema, or lesions of the skin. No visualized deformity.   No TTP to the cervical/thoracic/lumbar spine  Negative Marsh's, negative Babinksi, negative myoclonus.   DP, radial pulses palpable.  No calf tenderness bilaterally.  Compartments soft and compressible.     Motor:                   C5                C6              C7               C8           T1   R            5/5                5/5            5/5             5/5          5/5  L             5/5               5/5             5/5             5/5          5/5                L2             L3             L4               L5            S1  R         5/5           5/5          5/5             5/5           5/5  L          5/5          5/5           5/5             5/5           5/5    Sensory:            C5         C6         C7      C8       T1        (0=absent, 1=impaired, 2=normal, NT=not testable)  R         2            2           2        2         2  L          2            2           2        2         2               L2          L3         L4      L5       S1         (0=absent, 1=impaired, 2=normal, NT=not testable)  R         2            2            2        2        2  L          2            2           2        2         2    Plan was bedrest until patient obtains a TLSO Brace.  Once in the brace to start ambulation WBAT.  CT Scan performed over the weekend with no changes in the fracture sites.  Dr Prater will treat the patient non-operative.  PT for ambulation WBAT with brace.  PT recommended subacute rehab.  Will discharge when bed available.       History of Present Illness:   66F hx breast CA in remission, benign brain CA S/p excision, skin CA, seizure presented to [facility] ED with back pain, transferred to [facility] per family request. Patient is a community ambulator with cane and lives with her son and daughter in law. Patient notes that about three weeks ago, she was walking in her home when she tripped over some power cords and landed on her back. She noted immediate back pain but was able to continue walking and performing ADLs. She went to seek medical attention a few days after and was prescribed a muscle relaxant which helped with her pain. However, about a week ago, she tried to stop using her muscle relaxer and began to start experiencing pain in the lower back that would radiate around to the abdomen. She made an appointment with Dr. [facility] for next Tuesday, but the pain became so bad that she had to come to the ED. Patient denies leg pain, saddle anesthesia, bowel or bladder incontinence, numbness, tingling, weakness, or any other orthopaedic complaint.       Imaging: MRI imaging review from [facility] shows an acute VCF at T12 and a 3 column injury at T11-12.    Admitted to [facility] on 10/27/21 exam unchanged from [place]: NAD, in bed comfortably  Spine:   Skin intact. No edema, erythema, or lesions of the skin. No visualized deformity.   No TTP to the cervical/thoracic/lumbar spine  Negative Marsh's, negative Babinksi, negative myoclonus.   DP, radial pulses palpable.  No calf tenderness bilaterally.  Compartments soft and compressible.     Motor:                   C5                C6              C7               C8           T1   R            5/5                5/5            5/5             5/5          5/5  L             5/5               5/5             5/5             5/5          5/5                L2             L3             L4               L5            S1  R         5/5           5/5          5/5             5/5           5/5  L          5/5          5/5           5/5             5/5           5/5    Sensory:            C5         C6         C7      C8       T1        (0=absent, 1=impaired, 2=normal, NT=not testable)  R         2            2           2        2         2  L          2            2           2        2         2               L2          L3         L4      L5       S1         (0=absent, 1=impaired, 2=normal, NT=not testable)  R         2            2            2        2        2  L          2            2           2        2         2    Plan was bedrest until patient obtains a TLSO Brace.  Once in the brace to start ambulation WBAT.  CT Scan performed over the weekend with no changes in the fracture sites.  Originally, Dr. Prater planned to treat the patient non-operatively, but requesting surgery.   Patient underwent T10-L3 posterior spinal fusion on 9/9/21. Patient tolerated procedure well.   Patient required 1U PRBCs post-operatively due to acute blood loss anemia from surgery.  Patient recommended for RUSTY post-operatively.   Remainder of hospital stay unremarkable. Patient stable and ready for discharge when bed available.

## 2021-11-01 NOTE — DISCHARGE NOTE PROVIDER - NSDCCPCAREPLAN_GEN_ALL_CORE_FT
PRINCIPAL DISCHARGE DIAGNOSIS  Diagnosis: Vertebral compression fracture  Assessment and Plan of Treatment: non-operative

## 2021-11-01 NOTE — DISCHARGE NOTE PROVIDER - NSDCCPTREATMENT_GEN_ALL_CORE_FT
PRINCIPAL PROCEDURE  Procedure: Fusion, posterior spinal column, thoracic, posterior approach, using instrumentation  Findings and Treatment:

## 2021-11-01 NOTE — DISCHARGE NOTE PROVIDER - NSDCFUADDAPPT_GEN_ALL_CORE_FT
Follow up with Dr Prater after discharge from rehab. Follow up with Dr. Prater after discharge from rehab.

## 2021-11-02 PROCEDURE — 99233 SBSQ HOSP IP/OBS HIGH 50: CPT

## 2021-11-02 RX ADMIN — HEPARIN SODIUM 5000 UNIT(S): 5000 INJECTION INTRAVENOUS; SUBCUTANEOUS at 05:40

## 2021-11-02 RX ADMIN — Medication 200 MILLIGRAM(S): at 17:12

## 2021-11-02 RX ADMIN — Medication 650 MILLIGRAM(S): at 06:09

## 2021-11-02 RX ADMIN — PANTOPRAZOLE SODIUM 40 MILLIGRAM(S): 20 TABLET, DELAYED RELEASE ORAL at 05:40

## 2021-11-02 RX ADMIN — Medication 650 MILLIGRAM(S): at 01:03

## 2021-11-02 RX ADMIN — DIVALPROEX SODIUM 250 MILLIGRAM(S): 500 TABLET, DELAYED RELEASE ORAL at 17:12

## 2021-11-02 RX ADMIN — Medication 650 MILLIGRAM(S): at 13:03

## 2021-11-02 RX ADMIN — HEPARIN SODIUM 5000 UNIT(S): 5000 INJECTION INTRAVENOUS; SUBCUTANEOUS at 22:21

## 2021-11-02 RX ADMIN — Medication 200 MILLIGRAM(S): at 05:39

## 2021-11-02 RX ADMIN — Medication 650 MILLIGRAM(S): at 17:40

## 2021-11-02 RX ADMIN — Medication 650 MILLIGRAM(S): at 05:39

## 2021-11-02 RX ADMIN — ONDANSETRON 4 MILLIGRAM(S): 8 TABLET, FILM COATED ORAL at 08:53

## 2021-11-02 RX ADMIN — DIVALPROEX SODIUM 250 MILLIGRAM(S): 500 TABLET, DELAYED RELEASE ORAL at 05:39

## 2021-11-02 RX ADMIN — HEPARIN SODIUM 5000 UNIT(S): 5000 INJECTION INTRAVENOUS; SUBCUTANEOUS at 13:03

## 2021-11-02 RX ADMIN — Medication 650 MILLIGRAM(S): at 00:33

## 2021-11-02 RX ADMIN — Medication 650 MILLIGRAM(S): at 13:30

## 2021-11-02 RX ADMIN — Medication 650 MILLIGRAM(S): at 17:12

## 2021-11-02 NOTE — PROGRESS NOTE ADULT - SUBJECTIVE AND OBJECTIVE BOX
Patient resting without complaints.  No chest pain, SOB, N/V.  No new neuro deficits.  Patient stating that she is now interested in spinal fusion surgery after discussion with Dr. Prtaer/ CT scan over the weekend.    T(C): 36.3 (11-02-21 @ 04:13), Max: 36.9 (11-02-21 @ 00:08)  HR: 86 (11-02-21 @ 04:13) (81 - 97)  BP: 150/86 (11-02-21 @ 04:13) (102/64 - 150/86)  RR: 18 (11-02-21 @ 04:13) (18 - 18)  SpO2: 97% (11-02-21 @ 04:13) (97% - 100%)  Wt(kg): --    Exam:  Alert and Oriented, No Acute Distress  Grossly moving lower extremities  Calves Soft, Non-tender bilaterally  +PF/DF/EHL/FHL 5/5 bilat  SILT bilat

## 2021-11-02 NOTE — PROGRESS NOTE ADULT - SUBJECTIVE AND OBJECTIVE BOX
66F hx breast CA in remission, benign brain CA S/p excision, skin CA, seizure presented to [facility] ED with back pain, transferred to [facility] per family request. Patient is a community ambulator with cane and lives with her son and daughter in law. Patient notes that about three weeks ago, she was walking in her home when she tripped over some power cords and landed on her back. She noted immediate back pain but was able to continue walking and performing ADLs. She went to seek medical attention a few days after and was prescribed a muscle relaxant which helped with her pain. However, about a week ago, she tried to stop using her muscle relaxer and began to start experiencing pain in the lower back that would radiate around to the abdomen. She made an appointment with Dr. [facility] for next Tuesday, but the pain became so bad that she had to come to the ED. Patient denies leg pain, saddle anesthesia, bowel or bladder incontinence, numbness, tingling, weakness, or any other orthopaedic complaint. Patient seen now lying bed resting comfortably. On MRI Patient found to have unstable 3 column injury to the spine injury associated with a vertebral compression fracture at T12. Patient has been wearing a TLSO brace and states pain has been controlled. Has started working with physical therapy. Patient states pain has been well controlled and is awaiting rehab placement       MEDICATIONS  (STANDING):  acetaminophen     Tablet .. 650 milliGRAM(s) Oral every 6 hours  carBAMazepine 200 milliGRAM(s) Oral two times a day  diVALproex  milliGRAM(s) Oral two times a day  heparin   Injectable 5000 Unit(s) SubCutaneous every 8 hours  influenza  Vaccine (HIGH DOSE) 0.7 milliLiter(s) IntraMuscular once  pantoprazole    Tablet 40 milliGRAM(s) Oral before breakfast    MEDICATIONS  (PRN):  bisacodyl Suppository 10 milliGRAM(s) Rectal daily PRN If no bowel movement by POD#2  magnesium hydroxide Suspension 30 milliLiter(s) Oral daily PRN Constipation  methocarbamol 750 milliGRAM(s) Oral three times a day PRN Muscle Spasm  ondansetron Injectable 4 milliGRAM(s) IV Push every 8 hours PRN Nausea and/or Vomiting  oxyCODONE    IR 10 milliGRAM(s) Oral every 4 hours PRN Severe Pain (7 - 10)  oxyCODONE    IR 5 milliGRAM(s) Oral every 4 hours PRN Moderate Pain (4 - 6)  traMADol 50 milliGRAM(s) Oral every 6 hours PRN Mild Pain (1 - 3)          VITALS:   T(C): 36.5 (11-02-21 @ 13:09), Max: 36.9 (11-02-21 @ 00:08)  HR: 77 (11-02-21 @ 13:09) (77 - 100)  BP: 124/74 (11-02-21 @ 14:37) (107/65 - 150/86)  RR: 18 (11-02-21 @ 13:09) (18 - 18)  SpO2: 100% (11-02-21 @ 13:09) (97% - 100%)  Wt(kg): --    PHYSICAL EXAM:  GENERAL: NAD, well-groomed, well-developed  HEAD:  Atraumatic, Normocephalic  EYES: EOMI, PERRLA, conjunctiva and sclera clear  ENMT: No tonsillar erythema, exudates, or enlargement; Moist mucous membranes, Good dentition, No lesions  NECK: decreased ROM  NERVOUS SYSTEM:  Alert & Oriented X3, Good concentration; Motor Strength 5/5 B/L upper and lower extremities; DTRs 2+ intact and symmetric  CHEST/LUNG: Clear to percussion bilaterally; No rales, rhonchi, wheezing, or rubs  HEART: Regular rate and rhythm; No murmurs, rubs, or gallops  ABDOMEN: Soft, Nontender, Nondistended; Bowel sounds present  EXTREMITIES:  2+ Peripheral Pulses, No clubbing, cyanosis, or edema  LYMPH: No lymphadenopathy noted  SKIN: No rashes or lesions    LABS:    None

## 2021-11-03 LAB — HLA-B27 QL: NEGATIVE — SIGNIFICANT CHANGE UP

## 2021-11-03 PROCEDURE — 99233 SBSQ HOSP IP/OBS HIGH 50: CPT

## 2021-11-03 RX ORDER — LANOLIN ALCOHOL/MO/W.PET/CERES
5 CREAM (GRAM) TOPICAL AT BEDTIME
Refills: 0 | Status: DISCONTINUED | OUTPATIENT
Start: 2021-11-03 | End: 2021-11-09

## 2021-11-03 RX ADMIN — Medication 5 MILLIGRAM(S): at 21:52

## 2021-11-03 RX ADMIN — DIVALPROEX SODIUM 250 MILLIGRAM(S): 500 TABLET, DELAYED RELEASE ORAL at 17:49

## 2021-11-03 RX ADMIN — Medication 650 MILLIGRAM(S): at 12:37

## 2021-11-03 RX ADMIN — Medication 650 MILLIGRAM(S): at 23:56

## 2021-11-03 RX ADMIN — Medication 200 MILLIGRAM(S): at 17:50

## 2021-11-03 RX ADMIN — HEPARIN SODIUM 5000 UNIT(S): 5000 INJECTION INTRAVENOUS; SUBCUTANEOUS at 05:37

## 2021-11-03 RX ADMIN — Medication 200 MILLIGRAM(S): at 05:36

## 2021-11-03 RX ADMIN — Medication 650 MILLIGRAM(S): at 17:49

## 2021-11-03 RX ADMIN — Medication 650 MILLIGRAM(S): at 05:36

## 2021-11-03 RX ADMIN — PANTOPRAZOLE SODIUM 40 MILLIGRAM(S): 20 TABLET, DELAYED RELEASE ORAL at 05:36

## 2021-11-03 RX ADMIN — DIVALPROEX SODIUM 250 MILLIGRAM(S): 500 TABLET, DELAYED RELEASE ORAL at 05:37

## 2021-11-03 RX ADMIN — Medication 650 MILLIGRAM(S): at 18:50

## 2021-11-03 RX ADMIN — Medication 650 MILLIGRAM(S): at 13:50

## 2021-11-03 RX ADMIN — HEPARIN SODIUM 5000 UNIT(S): 5000 INJECTION INTRAVENOUS; SUBCUTANEOUS at 17:50

## 2021-11-03 RX ADMIN — HEPARIN SODIUM 5000 UNIT(S): 5000 INJECTION INTRAVENOUS; SUBCUTANEOUS at 21:52

## 2021-11-03 NOTE — PROGRESS NOTE ADULT - SUBJECTIVE AND OBJECTIVE BOX
66F hx breast CA in remission, benign brain CA S/p excision, skin CA, seizure presented to [facility] ED with back pain, transferred to [facility] per family request. Patient is a community ambulator with cane and lives with her son and daughter in law. Patient notes that about three weeks ago, she was walking in her home when she tripped over some power cords and landed on her back. She noted immediate back pain but was able to continue walking and performing ADLs. She went to seek medical attention a few days after and was prescribed a muscle relaxant which helped with her pain. However, about a week ago, she tried to stop using her muscle relaxer and began to start experiencing pain in the lower back that would radiate around to the abdomen. She made an appointment with Dr. [facility] for next Tuesday, but the pain became so bad that she had to come to the ED. Patient denies leg pain, saddle anesthesia, bowel or bladder incontinence, numbness, tingling, weakness, or any other orthopaedic complaint. Patient seen now lying bed resting comfortably. On MRI Patient found to have unstable 3 column injury to the spine injury associated with a vertebral compression fracture at T12. Patient has been wearing a TLSO brace and states pain has been controlled. Has started working with physical therapy. Patient states pain has been well controlled . Patient scheduled for surgery next week      MEDICATIONS  (STANDING):  acetaminophen     Tablet .. 650 milliGRAM(s) Oral every 6 hours  carBAMazepine 200 milliGRAM(s) Oral two times a day  diVALproex  milliGRAM(s) Oral two times a day  heparin   Injectable 5000 Unit(s) SubCutaneous every 8 hours  influenza  Vaccine (HIGH DOSE) 0.7 milliLiter(s) IntraMuscular once  pantoprazole    Tablet 40 milliGRAM(s) Oral before breakfast    MEDICATIONS  (PRN):  bisacodyl Suppository 10 milliGRAM(s) Rectal daily PRN If no bowel movement by POD#2  magnesium hydroxide Suspension 30 milliLiter(s) Oral daily PRN Constipation  methocarbamol 750 milliGRAM(s) Oral three times a day PRN Muscle Spasm  ondansetron Injectable 4 milliGRAM(s) IV Push every 8 hours PRN Nausea and/or Vomiting  oxyCODONE    IR 10 milliGRAM(s) Oral every 4 hours PRN Severe Pain (7 - 10)  oxyCODONE    IR 5 milliGRAM(s) Oral every 4 hours PRN Moderate Pain (4 - 6)  traMADol 50 milliGRAM(s) Oral every 6 hours PRN Mild Pain (1 - 3)          VITALS:   T(C): 36.7 (11-03-21 @ 12:40), Max: 36.8 (11-03-21 @ 00:01)  HR: 89 (11-03-21 @ 12:40) (89 - 100)  BP: 100/65 (11-03-21 @ 12:40) (99/66 - 141/77)  RR: 18 (11-03-21 @ 12:40) (18 - 18)  SpO2: 95% (11-03-21 @ 12:40) (95% - 99%)  Wt(kg): --    PHYSICAL EXAM:  GENERAL: NAD, well-groomed, well-developed  HEAD:  Atraumatic, Normocephalic  EYES: EOMI, PERRLA, conjunctiva and sclera clear  ENMT: No tonsillar erythema, exudates, or enlargement; Moist mucous membranes, Good dentition, No lesions  NECK: decreased ROM  NERVOUS SYSTEM:  Alert & Oriented X3, Good concentration; Motor Strength 5/5 B/L upper and lower extremities; DTRs 2+ intact and symmetric  CHEST/LUNG: Clear to percussion bilaterally; No rales, rhonchi, wheezing, or rubs  HEART: Regular rate and rhythm; No murmurs, rubs, or gallops  ABDOMEN: Soft, Nontender, Nondistended; Bowel sounds present  EXTREMITIES:  2+ Peripheral Pulses, No clubbing, cyanosis, or edema  LYMPH: No lymphadenopathy noted  SKIN: No rashes or lesions    LABS:                      CAPILLARY BLOOD GLUCOSE          RADIOLOGY & ADDITIONAL TESTS:

## 2021-11-03 NOTE — CHART NOTE - NSCHARTNOTEFT_GEN_A_CORE
Rossana was feeling excessive pressure at sternum. I re-contoured the thoracic extension and reapplied the TLSO. Patient was satisfied with outcome.   Bhavesh MCDOWELL  [facility]  [phone]

## 2021-11-03 NOTE — PROGRESS NOTE ADULT - SUBJECTIVE AND OBJECTIVE BOX
Patient resting without complaints.  No chest pain, SOB, N/V.  Per patient d/w Dr. Prater, patient now electing for VCF stabilization surgery.    T(C): 36.8 (11-03-21 @ 04:37), Max: 36.8 (11-03-21 @ 00:01)  HR: 96 (11-03-21 @ 04:37) (77 - 100)  BP: 141/77 (11-03-21 @ 04:37) (99/66 - 141/77)  RR: 18 (11-03-21 @ 04:37) (18 - 18)  SpO2: 99% (11-03-21 @ 04:37) (98% - 100%)  Wt(kg): --    Exam:  Alert and Oriented, No Acute Distress  Calves Soft, Non-tender bilaterally  +PF/DF/EHL/FHL 5/5 bilat  SILT bilat

## 2021-11-04 PROCEDURE — 99231 SBSQ HOSP IP/OBS SF/LOW 25: CPT

## 2021-11-04 RX ADMIN — PANTOPRAZOLE SODIUM 40 MILLIGRAM(S): 20 TABLET, DELAYED RELEASE ORAL at 07:00

## 2021-11-04 RX ADMIN — Medication 650 MILLIGRAM(S): at 01:40

## 2021-11-04 RX ADMIN — Medication 650 MILLIGRAM(S): at 12:40

## 2021-11-04 RX ADMIN — Medication 650 MILLIGRAM(S): at 17:39

## 2021-11-04 RX ADMIN — DIVALPROEX SODIUM 250 MILLIGRAM(S): 500 TABLET, DELAYED RELEASE ORAL at 05:58

## 2021-11-04 RX ADMIN — HEPARIN SODIUM 5000 UNIT(S): 5000 INJECTION INTRAVENOUS; SUBCUTANEOUS at 17:49

## 2021-11-04 RX ADMIN — Medication 200 MILLIGRAM(S): at 05:58

## 2021-11-04 RX ADMIN — HEPARIN SODIUM 5000 UNIT(S): 5000 INJECTION INTRAVENOUS; SUBCUTANEOUS at 05:58

## 2021-11-04 RX ADMIN — DIVALPROEX SODIUM 250 MILLIGRAM(S): 500 TABLET, DELAYED RELEASE ORAL at 17:39

## 2021-11-04 RX ADMIN — Medication 650 MILLIGRAM(S): at 07:00

## 2021-11-04 RX ADMIN — METHOCARBAMOL 750 MILLIGRAM(S): 500 TABLET, FILM COATED ORAL at 17:41

## 2021-11-04 RX ADMIN — Medication 200 MILLIGRAM(S): at 17:40

## 2021-11-04 RX ADMIN — Medication 650 MILLIGRAM(S): at 13:30

## 2021-11-04 RX ADMIN — HEPARIN SODIUM 5000 UNIT(S): 5000 INJECTION INTRAVENOUS; SUBCUTANEOUS at 21:18

## 2021-11-04 RX ADMIN — Medication 650 MILLIGRAM(S): at 05:58

## 2021-11-04 RX ADMIN — Medication 650 MILLIGRAM(S): at 18:15

## 2021-11-04 RX ADMIN — METHOCARBAMOL 750 MILLIGRAM(S): 500 TABLET, FILM COATED ORAL at 05:58

## 2021-11-04 RX ADMIN — Medication 5 MILLIGRAM(S): at 21:18

## 2021-11-04 NOTE — PROGRESS NOTE ADULT - SUBJECTIVE AND OBJECTIVE BOX
66F hx breast CA in remission, benign brain CA S/p excision, skin CA, seizure presented to [facility] ED with back pain, transferred to [facility] per family request. Patient is a community ambulator with cane and lives with her son and daughter in law. Patient notes that about three weeks ago, she was walking in her home when she tripped over some power cords and landed on her back. She noted immediate back pain but was able to continue walking and performing ADLs. She went to seek medical attention a few days after and was prescribed a muscle relaxant which helped with her pain. However, about a week ago, she tried to stop using her muscle relaxer and began to start experiencing pain in the lower back that would radiate around to the abdomen. She made an appointment with Dr. [facility] for next Tuesday, but the pain became so bad that she had to come to the ED. Patient denies leg pain, saddle anesthesia, bowel or bladder incontinence, numbness, tingling, weakness, or any other orthopaedic complaint. Patient seen now lying bed resting comfortably. On MRI Patient found to have unstable 3 column injury to the spine injury associated with a vertebral compression fracture at T12. Patient has been wearing a TLSO brace and states pain has been controlled. Has started working with physical therapy. Patient states pain has been well controlled . Patient scheduled for surgery next week. Patient states she has been feeling well. Has been working with physical therapy       MEDICATIONS  (STANDING):  acetaminophen     Tablet .. 650 milliGRAM(s) Oral every 6 hours  carBAMazepine 200 milliGRAM(s) Oral two times a day  diVALproex  milliGRAM(s) Oral two times a day  heparin   Injectable 5000 Unit(s) SubCutaneous every 8 hours  influenza  Vaccine (HIGH DOSE) 0.7 milliLiter(s) IntraMuscular once  melatonin 5 milliGRAM(s) Oral at bedtime  pantoprazole    Tablet 40 milliGRAM(s) Oral before breakfast    MEDICATIONS  (PRN):  bisacodyl Suppository 10 milliGRAM(s) Rectal daily PRN If no bowel movement by POD#2  magnesium hydroxide Suspension 30 milliLiter(s) Oral daily PRN Constipation  methocarbamol 750 milliGRAM(s) Oral three times a day PRN Muscle Spasm  ondansetron Injectable 4 milliGRAM(s) IV Push every 8 hours PRN Nausea and/or Vomiting          VITALS:   T(C): 36.9 (11-04-21 @ 17:17), Max: 37.1 (11-04-21 @ 04:24)  HR: 81 (11-04-21 @ 17:17) (77 - 99)  BP: 102/66 (11-04-21 @ 17:17) (102/66 - 120/69)  RR: 18 (11-04-21 @ 17:17) (18 - 18)  SpO2: 97% (11-04-21 @ 17:17) (96% - 98%)  Wt(kg): --    well-groomed, well-developed  HEAD:  Atraumatic, Normocephalic  EYES: EOMI, PERRLA, conjunctiva and sclera clear  ENMT: No tonsillar erythema, exudates, or enlargement; Moist mucous membranes, Good dentition, No lesions  NECK: decreased ROM  NERVOUS SYSTEM:  Alert & Oriented X3, Good concentration; Motor Strength 5/5 B/L upper and lower extremities; DTRs 2+ intact and symmetric  CHEST/LUNG: Clear to percussion bilaterally; No rales, rhonchi, wheezing, or rubs  HEART: Regular rate and rhythm; No murmurs, rubs, or gallops  ABDOMEN: Soft, Nontender, Nondistended; Bowel sounds present  EXTREMITIES:  2+ Peripheral Pulses, No clubbing, cyanosis, or edema  LYMPH: No lymphadenopathy noted  SKIN: No rashes or lesions    LABS:                      CAPILLARY BLOOD GLUCOSE          RADIOLOGY & ADDITIONAL TESTS:

## 2021-11-04 NOTE — PROGRESS NOTE ADULT - SUBJECTIVE AND OBJECTIVE BOX
Patient is a 66y old  Female who presents with a chief complaint of T12 VCF w/ T11-T12 3 column injury   Patient for surgical intervention 11/9/2021  Patient comfortable  No complaints    T(C): 37.1 (11-04-21 @ 04:24), Max: 37.1 (11-04-21 @ 04:24)  HR: 77 (11-04-21 @ 04:24) (77 - 91)  BP: 120/69 (11-04-21 @ 04:24) (100/65 - 120/69)  RR: 18 (11-04-21 @ 04:24) (18 - 18)  SpO2: 97% (11-04-21 @ 04:24) (95% - 100%)    PHYSICAL EXAM:  NAD, Alert   sensation grossly intact to light touch; (+) Distal Pulses; No Calf tenderness B/L, PAS              [ ] Lower extremeity                  PF          DF         EHL       FHL                                                                                            R        5/5        5/5        5/5       5/5                                                        L         5/5        5/5        5/5       5/5

## 2021-11-05 RX ADMIN — HEPARIN SODIUM 5000 UNIT(S): 5000 INJECTION INTRAVENOUS; SUBCUTANEOUS at 13:57

## 2021-11-05 RX ADMIN — Medication 650 MILLIGRAM(S): at 19:30

## 2021-11-05 RX ADMIN — Medication 650 MILLIGRAM(S): at 13:17

## 2021-11-05 RX ADMIN — DIVALPROEX SODIUM 250 MILLIGRAM(S): 500 TABLET, DELAYED RELEASE ORAL at 06:24

## 2021-11-05 RX ADMIN — Medication 200 MILLIGRAM(S): at 19:33

## 2021-11-05 RX ADMIN — METHOCARBAMOL 750 MILLIGRAM(S): 500 TABLET, FILM COATED ORAL at 19:34

## 2021-11-05 RX ADMIN — Medication 650 MILLIGRAM(S): at 06:24

## 2021-11-05 RX ADMIN — HEPARIN SODIUM 5000 UNIT(S): 5000 INJECTION INTRAVENOUS; SUBCUTANEOUS at 21:47

## 2021-11-05 RX ADMIN — Medication 650 MILLIGRAM(S): at 14:00

## 2021-11-05 RX ADMIN — HEPARIN SODIUM 5000 UNIT(S): 5000 INJECTION INTRAVENOUS; SUBCUTANEOUS at 06:23

## 2021-11-05 RX ADMIN — Medication 650 MILLIGRAM(S): at 19:00

## 2021-11-05 RX ADMIN — Medication 650 MILLIGRAM(S): at 06:51

## 2021-11-05 RX ADMIN — PANTOPRAZOLE SODIUM 40 MILLIGRAM(S): 20 TABLET, DELAYED RELEASE ORAL at 06:24

## 2021-11-05 RX ADMIN — Medication 200 MILLIGRAM(S): at 06:23

## 2021-11-05 RX ADMIN — DIVALPROEX SODIUM 250 MILLIGRAM(S): 500 TABLET, DELAYED RELEASE ORAL at 19:34

## 2021-11-05 RX ADMIN — Medication 5 MILLIGRAM(S): at 21:46

## 2021-11-05 NOTE — PROGRESS NOTE ADULT - SUBJECTIVE AND OBJECTIVE BOX
66F hx breast CA in remission, benign brain CA S/p excision, skin CA, seizure presented to [facility] ED with back pain, transferred to [facility] per family request. Patient is a community ambulator with cane and lives with her son and daughter in law. Patient notes that about three weeks ago, she was walking in her home when she tripped over some power cords and landed on her back. She noted immediate back pain but was able to continue walking and performing ADLs. She went to seek medical attention a few days after and was prescribed a muscle relaxant which helped with her pain. However, about a week ago, she tried to stop using her muscle relaxer and began to start experiencing pain in the lower back that would radiate around to the abdomen. She made an appointment with Dr. [facility] for next Tuesday, but the pain became so bad that she had to come to the ED. Patient denies leg pain, saddle anesthesia, bowel or bladder incontinence, numbness, tingling, weakness, or any other orthopaedic complaint. Patient seen now lying bed resting comfortably. On MRI Patient found to have unstable 3 column injury to the spine injury associated with a vertebral compression fracture at T12. Patient has been wearing a TLSO brace and states pain has been controlled. Has started working with physical therapy. Patient states pain has been well controlled Patient states she has been feeling well. Has been working with physical therapy. Patient scheduled for surgery next week.       MEDICATIONS  (STANDING):  acetaminophen     Tablet .. 650 milliGRAM(s) Oral every 6 hours  carBAMazepine 200 milliGRAM(s) Oral two times a day  diVALproex  milliGRAM(s) Oral two times a day  heparin   Injectable 5000 Unit(s) SubCutaneous every 8 hours  influenza  Vaccine (HIGH DOSE) 0.7 milliLiter(s) IntraMuscular once  melatonin 5 milliGRAM(s) Oral at bedtime  pantoprazole    Tablet 40 milliGRAM(s) Oral before breakfast    MEDICATIONS  (PRN):  bisacodyl Suppository 10 milliGRAM(s) Rectal daily PRN If no bowel movement by POD#2  magnesium hydroxide Suspension 30 milliLiter(s) Oral daily PRN Constipation  methocarbamol 750 milliGRAM(s) Oral three times a day PRN Muscle Spasm  ondansetron Injectable 4 milliGRAM(s) IV Push every 8 hours PRN Nausea and/or Vomiting          VITALS:   T(C): 36.7 (11-05-21 @ 13:10), Max: 36.9 (11-04-21 @ 17:17)  HR: 78 (11-05-21 @ 13:10) (78 - 99)  BP: 114/72 (11-05-21 @ 13:10) (102/66 - 118/77)  RR: 18 (11-05-21 @ 13:10) (18 - 18)  SpO2: 98% (11-05-21 @ 13:10) (96% - 99%)  Wt(kg): --    Physical exam  well-groomed, well-developed  HEAD:  Atraumatic, Normocephalic  EYES: EOMI, PERRLA, conjunctiva and sclera clear  ENMT: No tonsillar erythema, exudates, or enlargement; Moist mucous membranes, Good dentition, No lesions  NECK: decreased ROM  NERVOUS SYSTEM:  Alert & Oriented X3, Good concentration; Motor Strength 5/5 B/L upper and lower extremities; DTRs 2+ intact and symmetric  CHEST/LUNG: Clear to percussion bilaterally; No rales, rhonchi, wheezing, or rubs  HEART: Regular rate and rhythm; No murmurs, rubs, or gallops  ABDOMEN: Soft, Nontender, Nondistended; Bowel sounds present  EXTREMITIES:  2+ Peripheral Pulses, No clubbing, cyanosis, or edema  LYMPH: No lymphadenopathy noted  SKIN: No rashes or lesions    LABS:    None

## 2021-11-05 NOTE — DIETITIAN INITIAL EVALUATION ADULT. - REASON
Nutrition-focused physical examination deferred at this time - pt with good PO intake. No overt signs of fat/muscle wasting visually observed

## 2021-11-05 NOTE — PROGRESS NOTE ADULT - SUBJECTIVE AND OBJECTIVE BOX
Patient is a 66y old  Female who presents with a chief complaint of T12 VCF, with T11-12 3 column injury  Patient for surgical intervention 11/9/2021  Patient comfortable  No complaints    T(C): 36.6 (11-05-21 @ 05:10), Max: 36.9 (11-04-21 @ 17:17)  HR: 79 (11-05-21 @ 05:10) (79 - 99)  BP: 118/68 (11-05-21 @ 05:10) (102/66 - 118/77)  RR: 18 (11-05-21 @ 05:10) (18 - 18)  SpO2: 96% (11-05-21 @ 05:10) (96% - 98%)    PHYSICAL EXAM:  NAD, Alert  ; sensation grossly intact to light touch; (+) Distal Pulses; No Calf tenderness B/L, PAS              [ ] Lower extremeity                  PF          DF         EHL       FHL                                                                                            R        5/5        5/5        5/5       5/5                                                        L         5/5        5/5        5/5       5/5

## 2021-11-05 NOTE — DIETITIAN INITIAL EVALUATION ADULT. - OTHER INFO
Pt reports good appetite in-house, expressing satisfaction with the food. Denies current chewing/swallowing difficulty.   Denies nausea, vomiting, constipation, diarrhea.  Last BM 11/3 per chart. Pt not currently on bowel regimen, Dulcolax and Magnesium hydroxide PRN.   Per EMR, pt currently receiving no supplements/micronutrients in-house.  Reports UBW 50.9 lbs. Reports feeling she lost some weight when she wasn't eating well at prior hospital. Dosing wt: 53.8 kg (10-27-21). Wt history per EMR: 54.5 kg (10-23-21); 49.9 kg (03-22-21) 52.5 kg (02-11-19)   Pt made aware RD to remain available. Preferences explored and supplements offered, pt declined. Pt does not like Vanilla flavor, does not like chocolate Ensure, declined chocolate shakes, ice cream, etc. Reported she will order them when she wants them.

## 2021-11-05 NOTE — DIETITIAN INITIAL EVALUATION ADULT. - ORAL INTAKE PTA/DIET HISTORY
Pt interviewed at bedside. Reports fair appetite with fair intake PTA when patient was at previous hospital 2/2 stress, not following any particular diet at home. Likes fruit, avoids caffeine and chocolate 2/2 acid reflux but will occasionally indulge in chocolate. Confirms NKFA. Pt denies use of dietary supplements/micronutrients PTA. Pt denies chewing/swallowing difficulty, and she reports she swallows fine as long as she chews thoroughly, declines speech eval.

## 2021-11-05 NOTE — DIETITIAN INITIAL EVALUATION ADULT. - ADD RECOMMEND
1) Continue current Regular diet 2) Monitor PO intake, GI tolerance, skin integrity, labs, weight. 3) Honor food preferences as feasible. 4) RD remains available upon request and will follow-up per protocol.

## 2021-11-05 NOTE — DIETITIAN INITIAL EVALUATION ADULT. - CHIEF COMPLAINT
66F hx breast CA in remission, benign brain CA S/p excision, skin CA, seizure presented to [facility] ED with back pain, transferred to [facility] per family request. Patient is a community ambulator with cane and lives with her son and daughter in law. Patient notes that about three weeks ago, she was walking in her home when she tripped over some power cords and landed on her back. She noted immediate back pain but was able to continue walking and performing ADLs. She went to seek medical attention a few days after and was prescribed a muscle relaxant which helped with her pain. However, about a week ago, she tried to stop using her muscle relaxer and began to start experiencing pain in the lower back that would radiate around to the abdomen. She made an appointment with Dr. [facility] for next Tuesday, but the pain became so bad that she had to come to the ED. On MRI Patient found to have unstable 3 column injury to the spine injury associated with a vertebral compression fracture at T12.

## 2021-11-05 NOTE — DIETITIAN INITIAL EVALUATION ADULT. - PERTINENT MEDS FT
MEDICATIONS  (STANDING):  acetaminophen     Tablet .. 650 milliGRAM(s) Oral every 6 hours  carBAMazepine 200 milliGRAM(s) Oral two times a day  diVALproex  milliGRAM(s) Oral two times a day  heparin   Injectable 5000 Unit(s) SubCutaneous every 8 hours  influenza  Vaccine (HIGH DOSE) 0.7 milliLiter(s) IntraMuscular once  melatonin 5 milliGRAM(s) Oral at bedtime  pantoprazole    Tablet 40 milliGRAM(s) Oral before breakfast    MEDICATIONS  (PRN):  bisacodyl Suppository 10 milliGRAM(s) Rectal daily PRN If no bowel movement by POD#2  magnesium hydroxide Suspension 30 milliLiter(s) Oral daily PRN Constipation  methocarbamol 750 milliGRAM(s) Oral three times a day PRN Muscle Spasm  ondansetron Injectable 4 milliGRAM(s) IV Push every 8 hours PRN Nausea and/or Vomiting

## 2021-11-06 RX ADMIN — Medication 650 MILLIGRAM(S): at 12:56

## 2021-11-06 RX ADMIN — Medication 650 MILLIGRAM(S): at 18:27

## 2021-11-06 RX ADMIN — Medication 650 MILLIGRAM(S): at 18:50

## 2021-11-06 RX ADMIN — HEPARIN SODIUM 5000 UNIT(S): 5000 INJECTION INTRAVENOUS; SUBCUTANEOUS at 21:58

## 2021-11-06 RX ADMIN — DIVALPROEX SODIUM 250 MILLIGRAM(S): 500 TABLET, DELAYED RELEASE ORAL at 05:59

## 2021-11-06 RX ADMIN — Medication 200 MILLIGRAM(S): at 18:28

## 2021-11-06 RX ADMIN — METHOCARBAMOL 750 MILLIGRAM(S): 500 TABLET, FILM COATED ORAL at 21:59

## 2021-11-06 RX ADMIN — Medication 650 MILLIGRAM(S): at 05:59

## 2021-11-06 RX ADMIN — Medication 5 MILLIGRAM(S): at 21:59

## 2021-11-06 RX ADMIN — PANTOPRAZOLE SODIUM 40 MILLIGRAM(S): 20 TABLET, DELAYED RELEASE ORAL at 05:59

## 2021-11-06 RX ADMIN — Medication 650 MILLIGRAM(S): at 13:20

## 2021-11-06 RX ADMIN — Medication 650 MILLIGRAM(S): at 06:24

## 2021-11-06 RX ADMIN — Medication 200 MILLIGRAM(S): at 05:59

## 2021-11-06 RX ADMIN — DIVALPROEX SODIUM 250 MILLIGRAM(S): 500 TABLET, DELAYED RELEASE ORAL at 18:28

## 2021-11-06 RX ADMIN — HEPARIN SODIUM 5000 UNIT(S): 5000 INJECTION INTRAVENOUS; SUBCUTANEOUS at 12:56

## 2021-11-06 RX ADMIN — HEPARIN SODIUM 5000 UNIT(S): 5000 INJECTION INTRAVENOUS; SUBCUTANEOUS at 05:58

## 2021-11-06 NOTE — PROGRESS NOTE ADULT - SUBJECTIVE AND OBJECTIVE BOX
66F hx breast CA in remission, benign brain CA S/p excision, skin CA, seizure presented to [facility] ED with back pain, transferred to [facility] per family request. Patient is a community ambulator with cane and lives with her son and daughter in law. Patient notes that about three weeks ago, she was walking in her home when she tripped over some power cords and landed on her back. She noted immediate back pain but was able to continue walking and performing ADLs. She went to seek medical attention a few days after and was prescribed a muscle relaxant which helped with her pain. However, about a week ago, she tried to stop using her muscle relaxer and began to start experiencing pain in the lower back that would radiate around to the abdomen. She made an appointment with Dr. [facility] for next Tuesday, but the pain became so bad that she had to come to the ED. Patient denies leg pain, saddle anesthesia, bowel or bladder incontinence, numbness, tingling, weakness, or any other orthopaedic complaint. Patient seen now lying bed resting comfortably. On MRI Patient found to have unstable 3 column injury to the spine injury associated with a vertebral compression fracture at T12. Patient has been wearing a TLSO brace and states pain has been controlled. Has started working with physical therapy. Patient states pain has been well controlled Patient states she has been feeling well. Has been working with physical therapy. Patient scheduled for surgery next week.       MEDICATIONS  (STANDING):  acetaminophen     Tablet .. 650 milliGRAM(s) Oral every 6 hours  carBAMazepine 200 milliGRAM(s) Oral two times a day  diVALproex  milliGRAM(s) Oral two times a day  heparin   Injectable 5000 Unit(s) SubCutaneous every 8 hours  influenza  Vaccine (HIGH DOSE) 0.7 milliLiter(s) IntraMuscular once  melatonin 5 milliGRAM(s) Oral at bedtime  pantoprazole    Tablet 40 milliGRAM(s) Oral before breakfast    MEDICATIONS  (PRN):  bisacodyl Suppository 10 milliGRAM(s) Rectal daily PRN If no bowel movement by POD#2  magnesium hydroxide Suspension 30 milliLiter(s) Oral daily PRN Constipation  methocarbamol 750 milliGRAM(s) Oral three times a day PRN Muscle Spasm  ondansetron Injectable 4 milliGRAM(s) IV Push every 8 hours PRN Nausea and/or Vomiting          VITALS:   T(C): 36.7 (11-06-21 @ 08:25), Max: 36.8 (11-05-21 @ 20:58)  HR: 80 (11-06-21 @ 08:25) (80 - 89)  BP: 125/68 (11-06-21 @ 08:25) (113/63 - 129/78)  RR: 18 (11-06-21 @ 08:25) (18 - 18)  SpO2: 93% (11-06-21 @ 08:25) (92% - 99%)  Wt(kg): --    Physical exam  well-groomed, well-developed  HEAD:  Atraumatic, Normocephalic  EYES: EOMI, PERRLA, conjunctiva and sclera clear  ENMT: No tonsillar erythema, exudates, or enlargement; Moist mucous membranes, Good dentition, No lesions  NECK: decreased ROM  NERVOUS SYSTEM:  Alert & Oriented X3, Good concentration; Motor Strength 5/5 B/L upper and lower extremities; DTRs 2+ intact and symmetric  CHEST/LUNG: Clear to percussion bilaterally; No rales, rhonchi, wheezing, or rubs  HEART: Regular rate and rhythm; No murmurs, rubs, or gallops  ABDOMEN: Soft, Nontender, Nondistended; Bowel sounds present  EXTREMITIES:  2+ Peripheral Pulses, No clubbing, cyanosis, or edema  LYMPH: No lymphadenopathy noted  SKIN: No rashes or lesions    LABS:                      CAPILLARY BLOOD GLUCOSE          RADIOLOGY & ADDITIONAL TESTS:

## 2021-11-06 NOTE — PROGRESS NOTE ADULT - SUBJECTIVE AND OBJECTIVE BOX
Patient resting without complaints.  No chest pain, SOB, N/V.    T(C): 36.5 (11-06-21 @ 05:12), Max: 36.8 (11-05-21 @ 20:58)  HR: 89 (11-06-21 @ 05:12) (78 - 89)  BP: 113/63 (11-06-21 @ 05:12) (113/63 - 129/78)  RR: 18 (11-06-21 @ 05:12) (18 - 18)  SpO2: 92% (11-06-21 @ 05:12) (92% - 99%)  Wt(kg): --    Exam:  Alert and Oriented, No Acute Distress  Calves Soft, Non-tender bilaterally  +PF/DF/EHL/FHL 5/5 bilat  SILT bilat

## 2021-11-07 RX ADMIN — Medication 5 MILLIGRAM(S): at 22:14

## 2021-11-07 RX ADMIN — Medication 200 MILLIGRAM(S): at 06:31

## 2021-11-07 RX ADMIN — Medication 650 MILLIGRAM(S): at 06:55

## 2021-11-07 RX ADMIN — Medication 650 MILLIGRAM(S): at 11:17

## 2021-11-07 RX ADMIN — Medication 650 MILLIGRAM(S): at 06:31

## 2021-11-07 RX ADMIN — Medication 650 MILLIGRAM(S): at 17:30

## 2021-11-07 RX ADMIN — HEPARIN SODIUM 5000 UNIT(S): 5000 INJECTION INTRAVENOUS; SUBCUTANEOUS at 06:31

## 2021-11-07 RX ADMIN — METHOCARBAMOL 750 MILLIGRAM(S): 500 TABLET, FILM COATED ORAL at 22:14

## 2021-11-07 RX ADMIN — HEPARIN SODIUM 5000 UNIT(S): 5000 INJECTION INTRAVENOUS; SUBCUTANEOUS at 14:38

## 2021-11-07 RX ADMIN — Medication 650 MILLIGRAM(S): at 17:01

## 2021-11-07 RX ADMIN — DIVALPROEX SODIUM 250 MILLIGRAM(S): 500 TABLET, DELAYED RELEASE ORAL at 06:31

## 2021-11-07 RX ADMIN — HEPARIN SODIUM 5000 UNIT(S): 5000 INJECTION INTRAVENOUS; SUBCUTANEOUS at 22:14

## 2021-11-07 RX ADMIN — Medication 200 MILLIGRAM(S): at 17:02

## 2021-11-07 RX ADMIN — DIVALPROEX SODIUM 250 MILLIGRAM(S): 500 TABLET, DELAYED RELEASE ORAL at 17:01

## 2021-11-07 RX ADMIN — Medication 650 MILLIGRAM(S): at 11:45

## 2021-11-07 NOTE — PROGRESS NOTE ADULT - SUBJECTIVE AND OBJECTIVE BOX
66F hx breast CA in remission, benign brain CA S/p excision, skin CA, seizure presented to [facility] ED with back pain, transferred to [facility] per family request. Patient is a community ambulator with cane and lives with her son and daughter in law. Patient notes that about three weeks ago, she was walking in her home when she tripped over some power cords and landed on her back. She noted immediate back pain but was able to continue walking and performing ADLs. She went to seek medical attention a few days after and was prescribed a muscle relaxant which helped with her pain. However, about a week ago, she tried to stop using her muscle relaxer and began to start experiencing pain in the lower back that would radiate around to the abdomen. She made an appointment with Dr. Link [facility] for next Tuesday, but the pain became so bad that she had to come to the ED. Patient denies leg pain, saddle anesthesia, bowel or bladder incontinence, numbness, tingling, weakness, or any other orthopaedic complaint. Patient seen now lying bed resting comfortably. On MRI Patient found to have unstable 3 column injury to the spine injury associated with a vertebral compression fracture at T12. Patient has been wearing a TLSO brace and states pain has been controlled. Has started working with physical therapy. Patient states pain has been well controlled. Has been working with physical therapy. Patient scheduled for surgery next week.       MEDICATIONS  (STANDING):  acetaminophen     Tablet .. 650 milliGRAM(s) Oral every 6 hours  carBAMazepine 200 milliGRAM(s) Oral two times a day  diVALproex  milliGRAM(s) Oral two times a day  heparin   Injectable 5000 Unit(s) SubCutaneous every 8 hours  influenza  Vaccine (HIGH DOSE) 0.7 milliLiter(s) IntraMuscular once  melatonin 5 milliGRAM(s) Oral at bedtime  pantoprazole    Tablet 40 milliGRAM(s) Oral before breakfast    MEDICATIONS  (PRN):  bisacodyl Suppository 10 milliGRAM(s) Rectal daily PRN If no bowel movement by POD#2  magnesium hydroxide Suspension 30 milliLiter(s) Oral daily PRN Constipation  methocarbamol 750 milliGRAM(s) Oral three times a day PRN Muscle Spasm  ondansetron Injectable 4 milliGRAM(s) IV Push every 8 hours PRN Nausea and/or Vomiting          VITALS:   T(C): 36.4 (11-07-21 @ 08:36), Max: 36.8 (11-06-21 @ 16:00)  HR: 74 (11-07-21 @ 08:36) (74 - 94)  BP: 115/77 (11-07-21 @ 08:36) (103/70 - 123/70)  RR: 18 (11-07-21 @ 08:36) (18 - 18)  SpO2: 98% (11-07-21 @ 08:36) (97% - 99%)  Wt(kg): --    Physical exam  well-groomed, well-developed  HEAD:  Atraumatic, Normocephalic  EYES: EOMI, PERRLA, conjunctiva and sclera clear  ENMT: No tonsillar erythema, exudates, or enlargement; Moist mucous membranes, Good dentition, No lesions  NECK: decreased ROM  NERVOUS SYSTEM:  Alert & Oriented X3, Good concentration; Motor Strength 5/5 B/L upper and lower extremities; DTRs 2+ intact and symmetric  CHEST/LUNG: Clear to percussion bilaterally; No rales, rhonchi, wheezing, or rubs  HEART: Regular rate and rhythm; No murmurs, rubs, or gallops  ABDOMEN: Soft, Nontender, Nondistended; Bowel sounds present  EXTREMITIES:  2+ Peripheral Pulses, No clubbing, cyanosis, or edema  LYMPH: No lymphadenopathy noted  SKIN: No rashes or lesions    LABS:                      CAPILLARY BLOOD GLUCOSE          RADIOLOGY & ADDITIONAL TESTS:

## 2021-11-07 NOTE — PROGRESS NOTE ADULT - SUBJECTIVE AND OBJECTIVE BOX
Patient Resting without complaints /events overnight.  T(C): 36.4 (11-07-21 @ 08:36), Max: 36.8 (11-06-21 @ 16:00)  HR: 74 (11-07-21 @ 08:36) (74 - 94)  BP: 115/77 (11-07-21 @ 08:36) (103/70 - 123/70)  RR: 18 (11-07-21 @ 08:36) (18 - 18)  SpO2: 98% (11-07-21 @ 08:36) (97% - 99%)    Exam:   Alert/Oriented, No Acute Distress         Sensation: SILT throughout BLE         Motor exam: [x]          [x] Upper extremity                Bi          Tri        Delt                                              R         5/5        5/5        5/5                                        L          5/5        5/5        5/5                  [x] Lower extremity           PF          DF         EHL       FHL                                                                                    R        5/5        5/5        5/5       5/5                                                L         5/5        5/5        5/5       5/5                                                                  Calves Soft/Non-tender bilaterally          +DP pulses             LABS:

## 2021-11-08 ENCOUNTER — TRANSCRIPTION ENCOUNTER (OUTPATIENT)
Age: 66
End: 2021-11-08

## 2021-11-08 LAB — SARS-COV-2 RNA SPEC QL NAA+PROBE: SIGNIFICANT CHANGE UP

## 2021-11-08 PROCEDURE — 99232 SBSQ HOSP IP/OBS MODERATE 35: CPT

## 2021-11-08 RX ORDER — SODIUM CHLORIDE 9 MG/ML
1000 INJECTION, SOLUTION INTRAVENOUS
Refills: 0 | Status: DISCONTINUED | OUTPATIENT
Start: 2021-11-08 | End: 2021-11-09

## 2021-11-08 RX ADMIN — Medication 650 MILLIGRAM(S): at 05:17

## 2021-11-08 RX ADMIN — Medication 200 MILLIGRAM(S): at 05:17

## 2021-11-08 RX ADMIN — DIVALPROEX SODIUM 250 MILLIGRAM(S): 500 TABLET, DELAYED RELEASE ORAL at 18:34

## 2021-11-08 RX ADMIN — HEPARIN SODIUM 5000 UNIT(S): 5000 INJECTION INTRAVENOUS; SUBCUTANEOUS at 14:14

## 2021-11-08 RX ADMIN — Medication 200 MILLIGRAM(S): at 18:33

## 2021-11-08 RX ADMIN — PANTOPRAZOLE SODIUM 40 MILLIGRAM(S): 20 TABLET, DELAYED RELEASE ORAL at 05:17

## 2021-11-08 RX ADMIN — Medication 650 MILLIGRAM(S): at 18:34

## 2021-11-08 RX ADMIN — Medication 5 MILLIGRAM(S): at 22:01

## 2021-11-08 RX ADMIN — Medication 650 MILLIGRAM(S): at 11:27

## 2021-11-08 RX ADMIN — DIVALPROEX SODIUM 250 MILLIGRAM(S): 500 TABLET, DELAYED RELEASE ORAL at 05:17

## 2021-11-08 RX ADMIN — HEPARIN SODIUM 5000 UNIT(S): 5000 INJECTION INTRAVENOUS; SUBCUTANEOUS at 05:17

## 2021-11-08 RX ADMIN — Medication 650 MILLIGRAM(S): at 12:15

## 2021-11-08 RX ADMIN — METHOCARBAMOL 750 MILLIGRAM(S): 500 TABLET, FILM COATED ORAL at 11:27

## 2021-11-08 RX ADMIN — HEPARIN SODIUM 5000 UNIT(S): 5000 INJECTION INTRAVENOUS; SUBCUTANEOUS at 22:00

## 2021-11-08 NOTE — PROGRESS NOTE ADULT - SUBJECTIVE AND OBJECTIVE BOX
66F hx breast CA in remission, benign brain CA S/p excision, skin CA, seizure presented to [facility] ED with back pain, transferred to [facility] per family request. Patient is a community ambulator with cane and lives with her son and daughter in law. Patient notes that about three weeks ago, she was walking in her home when she tripped over some power cords and landed on her back. She noted immediate back pain but was able to continue walking and performing ADLs. She went to seek medical attention a few days after and was prescribed a muscle relaxant which helped with her pain. However, about a week ago, she tried to stop using her muscle relaxer and began to start experiencing pain in the lower back that would radiate around to the abdomen. She made an appointment with Dr. Link of [facility] for next Tuesday, but the pain became so bad that she had to come to the ED. Patient denies leg pain, saddle anesthesia, bowel or bladder incontinence, numbness, tingling, weakness, or any other orthopaedic complaint. Patient seen now lying bed resting comfortably. On MRI Patient found to have unstable 3 column injury to the spine injury associated with a vertebral compression fracture at T12. Patient has been wearing a TLSO brace and states pain has been controlled. Has started working with physical therapy. Patient states pain has been well controlled. Has been working with physical therapy. Patient scheduled for surgery 11/9       MEDICATIONS  (STANDING):  acetaminophen     Tablet .. 650 milliGRAM(s) Oral every 6 hours  carBAMazepine 200 milliGRAM(s) Oral two times a day  diVALproex  milliGRAM(s) Oral two times a day  heparin   Injectable 5000 Unit(s) SubCutaneous every 8 hours  influenza  Vaccine (HIGH DOSE) 0.7 milliLiter(s) IntraMuscular once  lactated ringers. 1000 milliLiter(s) (120 mL/Hr) IV Continuous <Continuous>  melatonin 5 milliGRAM(s) Oral at bedtime  pantoprazole    Tablet 40 milliGRAM(s) Oral before breakfast    MEDICATIONS  (PRN):  bisacodyl Suppository 10 milliGRAM(s) Rectal daily PRN If no bowel movement by POD#2  magnesium hydroxide Suspension 30 milliLiter(s) Oral daily PRN Constipation  methocarbamol 750 milliGRAM(s) Oral three times a day PRN Muscle Spasm  ondansetron Injectable 4 milliGRAM(s) IV Push every 8 hours PRN Nausea and/or Vomiting          VITALS:   T(C): 36.5 (11-08-21 @ 13:28), Max: 36.8 (11-07-21 @ 21:12)  HR: 81 (11-08-21 @ 13:28) (70 - 89)  BP: 107/64 (11-08-21 @ 13:28) (104/66 - 131/75)  RR: 18 (11-08-21 @ 13:28) (18 - 18)  SpO2: 98% (11-08-21 @ 13:28) (97% - 98%)  Wt(kg): --    Physical exam  well-groomed, well-developed  HEAD:  Atraumatic, Normocephalic  EYES: EOMI, PERRLA, conjunctiva and sclera clear  ENMT: No tonsillar erythema, exudates, or enlargement; Moist mucous membranes, Good dentition, No lesions  NECK: decreased ROM  NERVOUS SYSTEM:  Alert & Oriented X3, Good concentration; Motor Strength 5/5 B/L upper and lower extremities; DTRs 2+ intact and symmetric  CHEST/LUNG: Clear to percussion bilaterally; No rales, rhonchi, wheezing, or rubs  HEART: Regular rate and rhythm; No murmurs, rubs, or gallops  ABDOMEN: Soft, Nontender, Nondistended; Bowel sounds present  EXTREMITIES:  2+ Peripheral Pulses, No clubbing, cyanosis, or edema  LYMPH: No lymphadenopathy noted  SKIN: No rashes or lesions    LABS:                      CAPILLARY BLOOD GLUCOSE          RADIOLOGY & ADDITIONAL TESTS:

## 2021-11-08 NOTE — PROGRESS NOTE ADULT - SUBJECTIVE AND OBJECTIVE BOX
ORTHO  Patient is a 66y old  Female who presents with a chief complaint of T12 Vertebral compression Fx w/ T11-T12 3 Column Injury (07 Nov 2021 10:15)    Pt. resting without complaint    VS-  T(C): 36.8 (11-08-21 @ 04:53), Max: 36.8 (11-07-21 @ 21:12)  HR: 80 (11-08-21 @ 04:53) (70 - 83)  BP: 131/75 (11-08-21 @ 04:53) (104/66 - 131/75)  RR: 18 (11-08-21 @ 04:53) (18 - 18)  SpO2: 98% (11-08-21 @ 04:53) (97% - 98%)  Wt(kg): --    M.S. A&O  Lower back min tenderness in bed  Neuro-              Motor- AROM (+) Ankle, EHL 5/5              Sensation- grossly intact to light touch              Calves- soft, nontender- PAS

## 2021-11-09 LAB
ANION GAP SERPL CALC-SCNC: 9 MMOL/L — SIGNIFICANT CHANGE UP (ref 5–17)
APTT BLD: 63.2 SEC — HIGH (ref 27.5–35.5)
BLD GP AB SCN SERPL QL: NEGATIVE — SIGNIFICANT CHANGE UP
BUN SERPL-MCNC: 16 MG/DL — SIGNIFICANT CHANGE UP (ref 7–23)
CALCIUM SERPL-MCNC: 9.1 MG/DL — SIGNIFICANT CHANGE UP (ref 8.4–10.5)
CHLORIDE SERPL-SCNC: 97 MMOL/L — SIGNIFICANT CHANGE UP (ref 96–108)
CO2 SERPL-SCNC: 28 MMOL/L — SIGNIFICANT CHANGE UP (ref 22–31)
CREAT SERPL-MCNC: 0.68 MG/DL — SIGNIFICANT CHANGE UP (ref 0.5–1.3)
GLUCOSE SERPL-MCNC: 95 MG/DL — SIGNIFICANT CHANGE UP (ref 70–99)
HCT VFR BLD CALC: 33.9 % — LOW (ref 34.5–45)
HGB BLD-MCNC: 11.1 G/DL — LOW (ref 11.5–15.5)
INR BLD: 0.97 RATIO — SIGNIFICANT CHANGE UP (ref 0.88–1.16)
MCHC RBC-ENTMCNC: 29.8 PG — SIGNIFICANT CHANGE UP (ref 27–34)
MCHC RBC-ENTMCNC: 32.7 GM/DL — SIGNIFICANT CHANGE UP (ref 32–36)
MCV RBC AUTO: 90.9 FL — SIGNIFICANT CHANGE UP (ref 80–100)
NRBC # BLD: 0 /100 WBCS — SIGNIFICANT CHANGE UP (ref 0–0)
PLATELET # BLD AUTO: 447 K/UL — HIGH (ref 150–400)
POTASSIUM SERPL-MCNC: 5.2 MMOL/L — SIGNIFICANT CHANGE UP (ref 3.5–5.3)
POTASSIUM SERPL-SCNC: 5.2 MMOL/L — SIGNIFICANT CHANGE UP (ref 3.5–5.3)
PROTHROM AB SERPL-ACNC: 11.7 SEC — SIGNIFICANT CHANGE UP (ref 10.6–13.6)
RBC # BLD: 3.73 M/UL — LOW (ref 3.8–5.2)
RBC # FLD: 13.2 % — SIGNIFICANT CHANGE UP (ref 10.3–14.5)
RH IG SCN BLD-IMP: POSITIVE — SIGNIFICANT CHANGE UP
SODIUM SERPL-SCNC: 134 MMOL/L — LOW (ref 135–145)
WBC # BLD: 10.1 K/UL — SIGNIFICANT CHANGE UP (ref 3.8–10.5)
WBC # FLD AUTO: 10.1 K/UL — SIGNIFICANT CHANGE UP (ref 3.8–10.5)

## 2021-11-09 PROCEDURE — 22800 ARTHRD PST DFRM<6 VRT SGM: CPT

## 2021-11-09 PROCEDURE — 22842 INSERT SPINE FIXATION DEVICE: CPT

## 2021-11-09 PROCEDURE — 22327 TREAT THORAX SPINE FRACTURE: CPT

## 2021-11-09 RX ORDER — ONDANSETRON 8 MG/1
4 TABLET, FILM COATED ORAL EVERY 6 HOURS
Refills: 0 | Status: DISCONTINUED | OUTPATIENT
Start: 2021-11-09 | End: 2021-11-09

## 2021-11-09 RX ORDER — POLYETHYLENE GLYCOL 3350 17 G/17G
17 POWDER, FOR SOLUTION ORAL DAILY
Refills: 0 | Status: DISCONTINUED | OUTPATIENT
Start: 2021-11-09 | End: 2021-11-15

## 2021-11-09 RX ORDER — NALOXONE HYDROCHLORIDE 4 MG/.1ML
0.1 SPRAY NASAL
Refills: 0 | Status: DISCONTINUED | OUTPATIENT
Start: 2021-11-09 | End: 2021-11-11

## 2021-11-09 RX ORDER — SENNA PLUS 8.6 MG/1
2 TABLET ORAL AT BEDTIME
Refills: 0 | Status: DISCONTINUED | OUTPATIENT
Start: 2021-11-09 | End: 2021-11-15

## 2021-11-09 RX ORDER — KETOROLAC TROMETHAMINE 30 MG/ML
10 SYRINGE (ML) INJECTION EVERY 6 HOURS
Refills: 0 | Status: DISCONTINUED | OUTPATIENT
Start: 2021-11-09 | End: 2021-11-14

## 2021-11-09 RX ORDER — CARBAMAZEPINE 200 MG
200 TABLET ORAL
Refills: 0 | Status: DISCONTINUED | OUTPATIENT
Start: 2021-11-09 | End: 2021-11-15

## 2021-11-09 RX ORDER — ACETAMINOPHEN 500 MG
500 TABLET ORAL EVERY 6 HOURS
Refills: 0 | Status: DISCONTINUED | OUTPATIENT
Start: 2021-11-09 | End: 2021-11-15

## 2021-11-09 RX ORDER — HYDROMORPHONE HYDROCHLORIDE 2 MG/ML
30 INJECTION INTRAMUSCULAR; INTRAVENOUS; SUBCUTANEOUS
Refills: 0 | Status: DISCONTINUED | OUTPATIENT
Start: 2021-11-09 | End: 2021-11-11

## 2021-11-09 RX ORDER — MAGNESIUM HYDROXIDE 400 MG/1
30 TABLET, CHEWABLE ORAL DAILY
Refills: 0 | Status: DISCONTINUED | OUTPATIENT
Start: 2021-11-09 | End: 2021-11-15

## 2021-11-09 RX ORDER — ONDANSETRON 8 MG/1
4 TABLET, FILM COATED ORAL EVERY 6 HOURS
Refills: 0 | Status: DISCONTINUED | OUTPATIENT
Start: 2021-11-09 | End: 2021-11-15

## 2021-11-09 RX ORDER — DIVALPROEX SODIUM 500 MG/1
250 TABLET, DELAYED RELEASE ORAL
Refills: 0 | Status: DISCONTINUED | OUTPATIENT
Start: 2021-11-09 | End: 2021-11-15

## 2021-11-09 RX ORDER — ONDANSETRON 8 MG/1
4 TABLET, FILM COATED ORAL ONCE
Refills: 0 | Status: DISCONTINUED | OUTPATIENT
Start: 2021-11-09 | End: 2021-11-10

## 2021-11-09 RX ORDER — METHOCARBAMOL 500 MG/1
750 TABLET, FILM COATED ORAL THREE TIMES A DAY
Refills: 0 | Status: DISCONTINUED | OUTPATIENT
Start: 2021-11-09 | End: 2021-11-15

## 2021-11-09 RX ORDER — HYDROMORPHONE HYDROCHLORIDE 2 MG/ML
0.5 INJECTION INTRAMUSCULAR; INTRAVENOUS; SUBCUTANEOUS
Refills: 0 | Status: DISCONTINUED | OUTPATIENT
Start: 2021-11-09 | End: 2021-11-10

## 2021-11-09 RX ORDER — SODIUM CHLORIDE 9 MG/ML
1000 INJECTION INTRAMUSCULAR; INTRAVENOUS; SUBCUTANEOUS
Refills: 0 | Status: DISCONTINUED | OUTPATIENT
Start: 2021-11-09 | End: 2021-11-15

## 2021-11-09 RX ORDER — CHLORHEXIDINE GLUCONATE 213 G/1000ML
1 SOLUTION TOPICAL DAILY
Refills: 0 | Status: DISCONTINUED | OUTPATIENT
Start: 2021-11-09 | End: 2021-11-09

## 2021-11-09 RX ORDER — TRAMADOL HYDROCHLORIDE 50 MG/1
50 TABLET ORAL EVERY 6 HOURS
Refills: 0 | Status: DISCONTINUED | OUTPATIENT
Start: 2021-11-09 | End: 2021-11-15

## 2021-11-09 RX ADMIN — Medication 200 MILLIGRAM(S): at 06:08

## 2021-11-09 RX ADMIN — SODIUM CHLORIDE 120 MILLILITER(S): 9 INJECTION, SOLUTION INTRAVENOUS at 00:33

## 2021-11-09 RX ADMIN — SODIUM CHLORIDE 75 MILLILITER(S): 9 INJECTION INTRAMUSCULAR; INTRAVENOUS; SUBCUTANEOUS at 23:48

## 2021-11-09 RX ADMIN — CHLORHEXIDINE GLUCONATE 1 APPLICATION(S): 213 SOLUTION TOPICAL at 11:38

## 2021-11-09 RX ADMIN — PANTOPRAZOLE SODIUM 40 MILLIGRAM(S): 20 TABLET, DELAYED RELEASE ORAL at 06:08

## 2021-11-09 RX ADMIN — DIVALPROEX SODIUM 250 MILLIGRAM(S): 500 TABLET, DELAYED RELEASE ORAL at 06:08

## 2021-11-09 RX ADMIN — HYDROMORPHONE HYDROCHLORIDE 30 MILLILITER(S): 2 INJECTION INTRAMUSCULAR; INTRAVENOUS; SUBCUTANEOUS at 23:43

## 2021-11-09 NOTE — PROGRESS NOTE ADULT - SUBJECTIVE AND OBJECTIVE BOX
66F hx breast CA in remission, benign brain CA S/p excision, skin CA, seizure presented to [facility] ED with back pain, transferred to [facility] per family request. Patient is a community ambulator with cane and lives with her son and daughter in law. Patient notes that about three weeks ago, she was walking in her home when she tripped over some power cords and landed on her back. She noted immediate back pain but was able to continue walking and performing ADLs. She went to seek medical attention a few days after and was prescribed a muscle relaxant which helped with her pain. However, about a week ago, she tried to stop using her muscle relaxer and began to start experiencing pain in the lower back that would radiate around to the abdomen. She made an appointment with Dr. Link [facility] for next Tuesday, but the pain became so bad that she had to come to the ED. Patient denies leg pain, saddle anesthesia, bowel or bladder incontinence, numbness, tingling, weakness, or any other orthopaedic complaint. Patient seen now lying bed resting comfortably. On MRI Patient found to have unstable 3 column injury to the spine injury associated with a vertebral compression fracture at T12. Patient has been wearing a TLSO brace and states pain has been controlled. Has started working with physical therapy. Patient states pain has been well controlled. Has been working with physical therapy. Patient scheduled for surgery 11/9       MEDICATIONS  (STANDING):  acetaminophen     Tablet .. 650 milliGRAM(s) Oral every 6 hours  carBAMazepine 200 milliGRAM(s) Oral two times a day  chlorhexidine 2% Cloths 1 Application(s) Topical daily  diVALproex  milliGRAM(s) Oral two times a day  influenza  Vaccine (HIGH DOSE) 0.7 milliLiter(s) IntraMuscular once  lactated ringers. 1000 milliLiter(s) (120 mL/Hr) IV Continuous <Continuous>  melatonin 5 milliGRAM(s) Oral at bedtime  pantoprazole    Tablet 40 milliGRAM(s) Oral before breakfast    MEDICATIONS  (PRN):  bisacodyl Suppository 10 milliGRAM(s) Rectal daily PRN If no bowel movement by POD#2  magnesium hydroxide Suspension 30 milliLiter(s) Oral daily PRN Constipation  methocarbamol 750 milliGRAM(s) Oral three times a day PRN Muscle Spasm  ondansetron Injectable 4 milliGRAM(s) IV Push every 8 hours PRN Nausea and/or Vomiting          VITALS:   T(C): 36.7 (11-09-21 @ 16:13), Max: 36.9 (11-09-21 @ 09:30)  HR: 80 (11-09-21 @ 16:13) (73 - 90)  BP: 130/71 (11-09-21 @ 16:13) (102/58 - 130/71)  RR: 18 (11-09-21 @ 16:13) (18 - 18)  SpO2: 98% (11-09-21 @ 16:13) (96% - 100%)  Wt(kg): --    Physical exam  well-groomed, well-developed  HEAD:  Atraumatic, Normocephalic  EYES: EOMI, PERRLA, conjunctiva and sclera clear  ENMT: No tonsillar erythema, exudates, or enlargement; Moist mucous membranes, Good dentition, No lesions  NECK: decreased ROM  NERVOUS SYSTEM:  Alert & Oriented X3, Good concentration; Motor Strength 5/5 B/L upper and lower extremities; DTRs 2+ intact and symmetric  CHEST/LUNG: Clear to percussion bilaterally; No rales, rhonchi, wheezing, or rubs  HEART: Regular rate and rhythm; No murmurs, rubs, or gallops  ABDOMEN: Soft, Nontender, Nondistended; Bowel sounds present  EXTREMITIES:  2+ Peripheral Pulses, No clubbing, cyanosis, or edema  LYMPH: No lymphadenopathy noted  SKIN: No rashes or lesions    LABS:        CBC Full  -  ( 09 Nov 2021 04:39 )  WBC Count : 10.10 K/uL  RBC Count : 3.73 M/uL  Hemoglobin : 11.1 g/dL  Hematocrit : 33.9 %  Platelet Count - Automated : 447 K/uL  Mean Cell Volume : 90.9 fl  Mean Cell Hemoglobin : 29.8 pg  Mean Cell Hemoglobin Concentration : 32.7 gm/dL  Auto Neutrophil # : x  Auto Lymphocyte # : x  Auto Monocyte # : x  Auto Eosinophil # : x  Auto Basophil # : x  Auto Neutrophil % : x  Auto Lymphocyte % : x  Auto Monocyte % : x  Auto Eosinophil % : x  Auto Basophil % : x    11-09    134<L>  |  97  |  16  ----------------------------<  95  5.2   |  28  |  0.68    Ca    9.1      09 Nov 2021 04:39        PT/INR - ( 09 Nov 2021 04:39 )   PT: 11.7 sec;   INR: 0.97 ratio         PTT - ( 09 Nov 2021 04:39 )  PTT:63.2 sec    CAPILLARY BLOOD GLUCOSE          RADIOLOGY & ADDITIONAL TESTS:

## 2021-11-09 NOTE — PRE-ANESTHESIA EVALUATION ADULT - NSANTHPEFT_GEN_ALL_CORE
rrr  cta  extremely limited neck flexion/extension/rotation. pt states this is her baseline condition

## 2021-11-09 NOTE — PRE-ANESTHESIA EVALUATION ADULT - NSANTHOSAYNRD_GEN_A_CORE
No. BLANK screening performed.  STOP BANG Legend: 0-2 = LOW Risk; 3-4 = INTERMEDIATE Risk; 5-8 = HIGH Risk

## 2021-11-09 NOTE — BRIEF OPERATIVE NOTE - NSICDXBRIEFPOSTOP_GEN_ALL_CORE_FT
POST-OP DIAGNOSIS:  Compression fracture of thoracic vertebra, unspecified thoracic vertebral level, sequela 09-Nov-2021 23:05:50  Michael Marroquin

## 2021-11-09 NOTE — BRIEF OPERATIVE NOTE - NSICDXBRIEFPROCEDURE_GEN_ALL_CORE_FT
PROCEDURES:  Fusion, posterior spinal column, thoracic, posterior approach, using instrumentation 09-Nov-2021 23:03:57  Michael Marroquin

## 2021-11-09 NOTE — PROGRESS NOTE ADULT - SUBJECTIVE AND OBJECTIVE BOX
Patient resting without complaints.  No chest pain, SOB, N/V.    T(C): 36.5 (11-09-21 @ 04:52), Max: 36.7 (11-08-21 @ 21:44)  HR: 73 (11-09-21 @ 04:52) (73 - 90)  BP: 102/58 (11-09-21 @ 04:52) (102/58 - 117/73)  RR: 18 (11-09-21 @ 04:52) (18 - 18)  SpO2: 99% (11-09-21 @ 04:52) (96% - 99%)  Wt(kg): --    Exam:  Alert and Oriented, No Acute Distress  Calves Soft, Non-tender bilaterally  +PF/DF/EHL/FHL 5/5 bilat  SILT bilat                        11.1   10.10 )-----------( 447      ( 09 Nov 2021 04:39 )             33.9    11-09    134<L>  |  97  |  16  ----------------------------<  95  5.2   |  28  |  0.68    Ca    9.1      09 Nov 2021 04:39

## 2021-11-09 NOTE — PRE-ANESTHESIA EVALUATION ADULT - NSANTHPMHFT_GEN_ALL_CORE
chart and consultant notes reviewed. s/p fall. no hx sig cv/pulm dz - states et 1-2 block 2/2 weakness, denies cp/sob. ekg noted. no signs active cad/chf. remote hx seizure, continues to take anti-epileptics.

## 2021-11-09 NOTE — BRIEF OPERATIVE NOTE - NSICDXBRIEFPREOP_GEN_ALL_CORE_FT
PRE-OP DIAGNOSIS:  Compression fracture of thoracic vertebra, unspecified thoracic vertebral level, sequela 09-Nov-2021 23:05:34  Michael Marroquin

## 2021-11-10 LAB
ANION GAP SERPL CALC-SCNC: 14 MMOL/L — SIGNIFICANT CHANGE UP (ref 5–17)
BUN SERPL-MCNC: 12 MG/DL — SIGNIFICANT CHANGE UP (ref 7–23)
CALCIUM SERPL-MCNC: 7.4 MG/DL — LOW (ref 8.4–10.5)
CHLORIDE SERPL-SCNC: 103 MMOL/L — SIGNIFICANT CHANGE UP (ref 96–108)
CO2 SERPL-SCNC: 22 MMOL/L — SIGNIFICANT CHANGE UP (ref 22–31)
CREAT SERPL-MCNC: 0.61 MG/DL — SIGNIFICANT CHANGE UP (ref 0.5–1.3)
GLUCOSE SERPL-MCNC: 155 MG/DL — HIGH (ref 70–99)
HCT VFR BLD CALC: 26.6 % — LOW (ref 34.5–45)
HGB BLD-MCNC: 8.6 G/DL — LOW (ref 11.5–15.5)
MCHC RBC-ENTMCNC: 30 PG — SIGNIFICANT CHANGE UP (ref 27–34)
MCHC RBC-ENTMCNC: 32.3 GM/DL — SIGNIFICANT CHANGE UP (ref 32–36)
MCV RBC AUTO: 92.7 FL — SIGNIFICANT CHANGE UP (ref 80–100)
NRBC # BLD: 0 /100 WBCS — SIGNIFICANT CHANGE UP (ref 0–0)
PLATELET # BLD AUTO: 433 K/UL — HIGH (ref 150–400)
POTASSIUM SERPL-MCNC: 4.1 MMOL/L — SIGNIFICANT CHANGE UP (ref 3.5–5.3)
POTASSIUM SERPL-SCNC: 4.1 MMOL/L — SIGNIFICANT CHANGE UP (ref 3.5–5.3)
RBC # BLD: 2.87 M/UL — LOW (ref 3.8–5.2)
RBC # FLD: 13.4 % — SIGNIFICANT CHANGE UP (ref 10.3–14.5)
SODIUM SERPL-SCNC: 139 MMOL/L — SIGNIFICANT CHANGE UP (ref 135–145)
WBC # BLD: 28.57 K/UL — HIGH (ref 3.8–10.5)
WBC # FLD AUTO: 28.57 K/UL — HIGH (ref 3.8–10.5)

## 2021-11-10 RX ORDER — ACETAMINOPHEN 500 MG
1000 TABLET ORAL ONCE
Refills: 0 | Status: COMPLETED | OUTPATIENT
Start: 2021-11-10 | End: 2021-11-10

## 2021-11-10 RX ORDER — HEPARIN SODIUM 5000 [USP'U]/ML
5000 INJECTION INTRAVENOUS; SUBCUTANEOUS EVERY 8 HOURS
Refills: 0 | Status: DISCONTINUED | OUTPATIENT
Start: 2021-11-11 | End: 2021-11-11

## 2021-11-10 RX ADMIN — METHOCARBAMOL 750 MILLIGRAM(S): 500 TABLET, FILM COATED ORAL at 11:24

## 2021-11-10 RX ADMIN — Medication 400 MILLIGRAM(S): at 06:41

## 2021-11-10 RX ADMIN — HYDROMORPHONE HYDROCHLORIDE 30 MILLILITER(S): 2 INJECTION INTRAMUSCULAR; INTRAVENOUS; SUBCUTANEOUS at 07:29

## 2021-11-10 RX ADMIN — POLYETHYLENE GLYCOL 3350 17 GRAM(S): 17 POWDER, FOR SOLUTION ORAL at 11:24

## 2021-11-10 RX ADMIN — DIVALPROEX SODIUM 250 MILLIGRAM(S): 500 TABLET, DELAYED RELEASE ORAL at 06:30

## 2021-11-10 RX ADMIN — Medication 1000 MILLIGRAM(S): at 07:00

## 2021-11-10 RX ADMIN — ONDANSETRON 4 MILLIGRAM(S): 8 TABLET, FILM COATED ORAL at 11:41

## 2021-11-10 RX ADMIN — Medication 500 MILLIGRAM(S): at 22:43

## 2021-11-10 RX ADMIN — METHOCARBAMOL 750 MILLIGRAM(S): 500 TABLET, FILM COATED ORAL at 06:31

## 2021-11-10 RX ADMIN — Medication 500 MILLIGRAM(S): at 23:15

## 2021-11-10 RX ADMIN — SENNA PLUS 2 TABLET(S): 8.6 TABLET ORAL at 22:44

## 2021-11-10 RX ADMIN — Medication 200 MILLIGRAM(S): at 06:30

## 2021-11-10 RX ADMIN — METHOCARBAMOL 750 MILLIGRAM(S): 500 TABLET, FILM COATED ORAL at 22:43

## 2021-11-10 RX ADMIN — Medication 200 MILLIGRAM(S): at 17:23

## 2021-11-10 RX ADMIN — DIVALPROEX SODIUM 250 MILLIGRAM(S): 500 TABLET, DELAYED RELEASE ORAL at 17:21

## 2021-11-10 RX ADMIN — HYDROMORPHONE HYDROCHLORIDE 30 MILLILITER(S): 2 INJECTION INTRAMUSCULAR; INTRAVENOUS; SUBCUTANEOUS at 02:50

## 2021-11-10 NOTE — PHYSICAL THERAPY INITIAL EVALUATION ADULT - IMPAIRMENTS CONTRIBUTING TO GAIT DEVIATIONS, PT EVAL
impaired balance/cognition/impaired postural control/decreased strength
impaired balance/impaired coordination/pain/impaired postural control/decreased strength

## 2021-11-10 NOTE — PHYSICAL THERAPY INITIAL EVALUATION ADULT - DIAGNOSIS, PT EVAL
Impaired strength, balance, endurance for functional mobility.
Impaired strength, balance, endurance for functional mobility.

## 2021-11-10 NOTE — PHYSICAL THERAPY INITIAL EVALUATION ADULT - BED MOBILITY TRAINING, PT EVAL
GOAL: Pt will perform bed mobility with (I) with use of bed rails in 2 weeks.
GOALS: Pt to perform bed mobility with supervision in 2 weeks.

## 2021-11-10 NOTE — PHYSICAL THERAPY INITIAL EVALUATION ADULT - IMPAIRED TRANSFERS: SIT/STAND, REHAB EVAL
impaired balance/impaired coordination/impaired postural control/decreased strength
impaired balance/impaired coordination/pain/impaired postural control/decreased strength

## 2021-11-10 NOTE — PHYSICAL THERAPY INITIAL EVALUATION ADULT - TRANSFER TRAINING, PT EVAL
GOALS: Pt to perform all transfers with supervision with RW in 2 weeks.
Goal: Pt will perform all transfers with (I) with RW in 2 weeks.

## 2021-11-10 NOTE — OCCUPATIONAL THERAPY INITIAL EVALUATION ADULT - BED MOBILITY TRAINING, PT EVAL
GOAL: Patient will perform bed mobility independently in two weeks
GOAL: Pt will perform bed mobility with I in 4 weeks

## 2021-11-10 NOTE — OCCUPATIONAL THERAPY INITIAL EVALUATION ADULT - ADL RETRAINING, OT EVAL
GOAL: Pt will perform toilet transfer & hygiene with I within 4 weeks
GOAL: Pt will perform LB dressing independently in two weeks

## 2021-11-10 NOTE — OCCUPATIONAL THERAPY INITIAL EVALUATION ADULT - RANGE OF MOTION EXAMINATION, UPPER EXTREMITY
within limits of spinal precautions/bilateral UE Active ROM was WNL (within normal limits)
bilateral UE Active ROM was WFL  (within functional limits)

## 2021-11-10 NOTE — OCCUPATIONAL THERAPY INITIAL EVALUATION ADULT - PERTINENT HX OF CURRENT PROBLEM, REHAB EVAL
65 yo F hx breast CA in remission, benign brain CA S/p excision, skin CA, seizure presented to [facility] ED with back pain, transferred to [facility] per family request. Patient is a community ambulator with cane and lives with her son and daughter in law. Patient notes that about three weeks ago, she was walking in her home when she tripped over some power cords and landed on her back See below
66 F admitted s/p fall on 10/27/21, hx breast CA in remission, benign brain CA S/p excision, skin CA, seizure presented to [facility] ED with back pain.

## 2021-11-10 NOTE — PHYSICAL THERAPY INITIAL EVALUATION ADULT - GAIT TRAINING, PT EVAL
Goals: Pt will ambulate 300 ft with supervision with RW in 2 weeks.
GOALS: Pt to ambulate 100 ft with RW with supervision in 2 weeks.

## 2021-11-10 NOTE — OCCUPATIONAL THERAPY INITIAL EVALUATION ADULT - TRANSFER TRAINING, PT EVAL
GOAL: Pt will perform sit to stand, bed <-> chair, toilet transfers with I in 4 weeks
GOAL: Patient will be independent with functional transfer with use of rolling walker  in two weeks

## 2021-11-10 NOTE — PHYSICAL THERAPY INITIAL EVALUATION ADULT - MANUAL MUSCLE TESTING RESULTS, REHAB EVAL
B/L UE atleast 3+/5, B/L LE atleast 3/5/grossly assessed due to
B/L LE and B/L UE functionally atleast 3+/5./grossly assessed due to

## 2021-11-10 NOTE — OCCUPATIONAL THERAPY INITIAL EVALUATION ADULT - NS ASR WT BEARING DETAIL LLE
X-ray of ribs: Fracture of the right fifth rib as noted. No pneumothorax./weight-bearing as tolerated

## 2021-11-10 NOTE — PHYSICAL THERAPY INITIAL EVALUATION ADULT - PERTINENT HX OF CURRENT PROBLEM, REHAB EVAL
66 F admitted s/p fall on 10/27/21, hx breast CA in remission, benign brain CA S/p excision, skin CA, seizure presented to [facility] ED with back pain.
66 F admitted s/p fall on 10/27/21, hx breast CA in remission, benign brain CA S/p excision, skin CA, seizure presented to [facility] ED with back pain.

## 2021-11-10 NOTE — OCCUPATIONAL THERAPY INITIAL EVALUATION ADULT - LIVES WITH, PROFILE
daughter and daughter's boyfriend/children
Pt lives in private home with daughter, 1 step to enter, tub with grab bars. Pt I in ADLs and ambulates with SC prior to admission

## 2021-11-10 NOTE — PHYSICAL THERAPY INITIAL EVALUATION ADULT - ADDITIONAL COMMENTS
Pt lives in a house with her daughter and daughter's boyfriend with 1 step to enter with R sided railing. Pt has grab bars inside her tub in the bathroom. Pt was independent in all ADLs PTA, uses a cane for community ambulation.
Pt lives in a house with her daughter and daughter's boyfriend with 1 step to enter with R sided railing. Pt has grab bars inside her tub in the bathroom. Pt was independent in all ADLs PTA, uses a cane for community ambulation.

## 2021-11-10 NOTE — PROGRESS NOTE ADULT - SUBJECTIVE AND OBJECTIVE BOX
Day 1 of Anesthesia Pain Management Service    SUBJECTIVE: I'm having some pain    Pain Scale Score:	[X] Refer to charted pain scores    THERAPY:    [ ] IV PCA Morphine		[ ] 5 mg/mL	[ ] 1 mg/mL  [X] IV PCA Hydromorphone	[ ] 5 mg/mL	[X] 1 mg/mL  [ ] IV PCA Fentanyl		[ ] 50 micrograms/mL    Demand dose: 0.2 mg     Lockout: 6 minutes   Continuous Rate: 0 mg/hr  4 Hour Limit: 4 mg    MEDICATIONS  (STANDING):  acetaminophen     Tablet .. 500 milliGRAM(s) Oral every 6 hours  carBAMazepine 200 milliGRAM(s) Oral two times a day  diVALproex  milliGRAM(s) Oral two times a day  HYDROmorphone PCA (1 mG/mL) 30 milliLiter(s) PCA Continuous PCA Continuous  influenza  Vaccine (HIGH DOSE) 0.7 milliLiter(s) IntraMuscular once  polyethylene glycol 3350 17 Gram(s) Oral daily  senna 2 Tablet(s) Oral at bedtime  sodium chloride 0.9%. 1000 milliLiter(s) (75 mL/Hr) IV Continuous <Continuous>    MEDICATIONS  (PRN):  ketorolac 10 milliGRAM(s) Oral every 6 hours PRN Moderate Pain (4 - 6)  magnesium hydroxide Suspension 30 milliLiter(s) Oral daily PRN Constipation  methocarbamol 750 milliGRAM(s) Oral three times a day PRN Muscle Spasm  naloxone Injectable 0.1 milliGRAM(s) IV Push every 3 minutes PRN For ANY of the following changes in patient status:  A. RR LESS THAN 10 breaths per minute, B. Oxygen saturation LESS THAN 90%, C. Sedation score of 6  ondansetron Injectable 4 milliGRAM(s) IV Push every 6 hours PRN Nausea and/or Vomiting  traMADol 50 milliGRAM(s) Oral every 6 hours PRN Severe Pain (7 - 10)      OBJECTIVE:    Sedation Score:	[ X] Alert 	[ ] Drowsy 	[ ] Arousable	[ ] Asleep	[ ] Unresponsive    Side Effects:	[X ] None	[ ] Nausea	[ ] Vomiting	[ ] Pruritus  		[ ] Other:    Vital Signs Last 24 Hrs  T(C): 36.7 (10 Nov 2021 08:52), Max: 36.9 (09 Nov 2021 09:30)  T(F): 98.1 (10 Nov 2021 08:52), Max: 98.4 (09 Nov 2021 09:30)  HR: 84 (10 Nov 2021 08:52) (74 - 149)  BP: 106/69 (10 Nov 2021 08:52) (89/51 - 134/67)  BP(mean): 83 (10 Nov 2021 01:30) (66 - 88)  RR: 17 (10 Nov 2021 08:52) (14 - 18)  SpO2: 95% (10 Nov 2021 08:52) (93% - 100%)    ASSESSMENT/ PLAN    Therapy to  be:               [X] Continued   [ ] Discontinued   [ ] Changed to PRN Analgesics    Documentation and Verification of current medications:   [X] Done	[ ] Not done, not eligible    Comments: Reeducated to PCA use.

## 2021-11-10 NOTE — OCCUPATIONAL THERAPY INITIAL EVALUATION ADULT - LEVEL OF INDEPENDENCE: STAND/SIT, REHAB EVAL
TLSO donned/minimum assist (75% patients effort)
minimum assist (75% patients effort)/moderate assist (50% patients effort)

## 2021-11-10 NOTE — PROVIDER CONTACT NOTE (CRITICAL VALUE NOTIFICATION) - ACTION/TREATMENT ORDERED:
pt assessed by dr north -no new orders obtained at this time-ok for pt to be transferred to floor bed now

## 2021-11-10 NOTE — OCCUPATIONAL THERAPY INITIAL EVALUATION ADULT - ANTICIPATED DISCHARGE DISPOSITION, OT EVAL
Subacute rehab/rehabilitation facility
Sec to pt being  s/p t10-L3 PCF pt would now benefit from aute in-pt rehab. KEI Martinez made aware/rehabilitation facility

## 2021-11-10 NOTE — PROGRESS NOTE ADULT - SUBJECTIVE AND OBJECTIVE BOX
66F hx breast CA in remission, benign brain CA S/p excision, skin CA, seizure presented to [facility] ED with back pain, transferred to [facility] per family request. Patient is a community ambulator with cane and lives with her son and daughter in law. Patient notes that about three weeks ago, she was walking in her home when she tripped over some power cords and landed on her back. She noted immediate back pain but was able to continue walking and performing ADLs. She went to seek medical attention a few days after and was prescribed a muscle relaxant which helped with her pain. However, about a week ago, she tried to stop using her muscle relaxer and began to start experiencing pain in the lower back that would radiate around to the abdomen. She made an appointment with Dr. Link of [facility] for next Tuesday, but the pain became so bad that she had to come to the ED. Patient denies leg pain, saddle anesthesia, bowel or bladder incontinence, numbness, tingling, weakness, or any other orthopaedic complaint. Patient seen now lying bed resting comfortably. On MRI Patient found to have unstable 3 column injury to the spine injury associated with a vertebral compression fracture at T12. Patient has been wearing a TLSO brace and states pain has been controlled. Has started working with physical therapy. Patient states pain has been well controlled. Has been working with physical therapy. Patient is s/p T10-L3 posterior fusion Tolerated the procedure well. Pain has been managed.        MEDICATIONS  (STANDING):  acetaminophen     Tablet .. 500 milliGRAM(s) Oral every 6 hours  carBAMazepine 200 milliGRAM(s) Oral two times a day  diVALproex  milliGRAM(s) Oral two times a day  HYDROmorphone PCA (1 mG/mL) 30 milliLiter(s) PCA Continuous PCA Continuous  influenza  Vaccine (HIGH DOSE) 0.7 milliLiter(s) IntraMuscular once  polyethylene glycol 3350 17 Gram(s) Oral daily  senna 2 Tablet(s) Oral at bedtime  sodium chloride 0.9%. 1000 milliLiter(s) (75 mL/Hr) IV Continuous <Continuous>    MEDICATIONS  (PRN):  ketorolac 10 milliGRAM(s) Oral every 6 hours PRN Moderate Pain (4 - 6)  magnesium hydroxide Suspension 30 milliLiter(s) Oral daily PRN Constipation  methocarbamol 750 milliGRAM(s) Oral three times a day PRN Muscle Spasm  naloxone Injectable 0.1 milliGRAM(s) IV Push every 3 minutes PRN For ANY of the following changes in patient status:  A. RR LESS THAN 10 breaths per minute, B. Oxygen saturation LESS THAN 90%, C. Sedation score of 6  ondansetron Injectable 4 milliGRAM(s) IV Push every 6 hours PRN Nausea and/or Vomiting  traMADol 50 milliGRAM(s) Oral every 6 hours PRN Severe Pain (7 - 10)          VITALS:   T(C): 36.7 (11-10-21 @ 08:52), Max: 36.9 (11-10-21 @ 02:20)  HR: 84 (11-10-21 @ 08:52) (74 - 149)  BP: 106/69 (11-10-21 @ 08:52) (89/51 - 134/67)  RR: 17 (11-10-21 @ 08:52) (14 - 18)  SpO2: 95% (11-10-21 @ 08:52) (93% - 100%)  Wt(kg): --    Physical exam  well-groomed, well-developed  HEAD:  Atraumatic, Normocephalic  EYES: EOMI, PERRLA, conjunctiva and sclera clear  ENMT: No tonsillar erythema, exudates, or enlargement; Moist mucous membranes, Good dentition, No lesions  NECK: decreased ROM  NERVOUS SYSTEM:  Alert & Oriented X3, Good concentration; Motor Strength 5/5 B/L upper and lower extremities; DTRs 2+ intact and symmetric  CHEST/LUNG: Clear to percussion bilaterally; No rales, rhonchi, wheezing, or rubs  HEART: Regular rate and rhythm; No murmurs, rubs, or gallops  ABDOMEN: Soft, Nontender, Nondistended; Bowel sounds present  EXTREMITIES:  2+ Peripheral Pulses, No clubbing, cyanosis, or edema  LYMPH: No lymphadenopathy noted  SKIN: No rashes or lesions    LABS:        CBC Full  -  ( 09 Nov 2021 23:49 )  WBC Count : 28.57 K/uL  RBC Count : 2.87 M/uL  Hemoglobin : 8.6 g/dL  Hematocrit : 26.6 %  Platelet Count - Automated : 433 K/uL  Mean Cell Volume : 92.7 fl  Mean Cell Hemoglobin : 30.0 pg  Mean Cell Hemoglobin Concentration : 32.3 gm/dL  Auto Neutrophil # : x  Auto Lymphocyte # : x  Auto Monocyte # : x  Auto Eosinophil # : x  Auto Basophil # : x  Auto Neutrophil % : x  Auto Lymphocyte % : x  Auto Monocyte % : x  Auto Eosinophil % : x  Auto Basophil % : x    11-09    139  |  103  |  12  ----------------------------<  155<H>  4.1   |  22  |  0.61    Ca    7.4<L>      09 Nov 2021 23:49        PT/INR - ( 09 Nov 2021 04:39 )   PT: 11.7 sec;   INR: 0.97 ratio         PTT - ( 09 Nov 2021 04:39 )  PTT:63.2 sec    CAPILLARY BLOOD GLUCOSE          RADIOLOGY & ADDITIONAL TESTS:

## 2021-11-10 NOTE — OCCUPATIONAL THERAPY INITIAL EVALUATION ADULT - ASR WT BEARING STATUS EVAL
MRI Lumbar Spine: Acute compression fracture of superior T12 vertebral body (25% height loss), with disruption of the ossified anterior longitudinal ligament, slight widening of anterior T11-T12 disc space and T11-T12 interspinous ligament edema/injury. Abnormal fluid signal within anterior and posterior T11-T12 disc, with questionable minimal edema within the posterior longitudinal ligament. Findings are consistent with unstable 3 column spinal injury, in the setting of possible ankylosing spondylitis. Bone contusions of T11-T12 spinous processes, with adjacent paraspinal soft tissue contusion. Additional small bone contusion at posterior inferior T11 vertebral body.Small prevertebral edema/hemorrhage at T11-T12. No significant epidural hematoma or spinal canal stenosis. Slight diffuse T1 hypointense marrow signal, nonspecific but may suggest anemia amongst multiple other etiologies such as lymphoproliferative disorder, smoking or obesity. Correlate clinically.. Pt s/p pcf sx/Left LE/Right LE

## 2021-11-10 NOTE — PHYSICAL THERAPY INITIAL EVALUATION ADULT - GAIT DEVIATIONS NOTED, PT EVAL
decreased wing/decreased step length/decreased weight-shifting ability
decreased wing/decreased step length/decreased weight-shifting ability

## 2021-11-10 NOTE — OCCUPATIONAL THERAPY INITIAL EVALUATION ADULT - PRECAUTIONS/LIMITATIONS, REHAB EVAL
Patient notes that about three weeks ago, she was walking in her home when she tripped over some power cords and landed on her back. She noted immediate back pain but was able to continue walking and performing ADLs. She went to seek medical attention a few days after and was prescribed a muscle relaxant which helped with her pain. However, about a week ago, she tried to stop using her muscle relaxer and began to start experiencing pain in the lower back that would radiate around to the abdomen.; spinal precautions; fall precautions

## 2021-11-10 NOTE — PHYSICAL THERAPY INITIAL EVALUATION ADULT - TRANSFER SAFETY CONCERNS NOTED: BED/CHAIR, REHAB EVAL
losing balance/decreased sequencing ability/decreased step length/decreased weight-shifting ability
losing balance/decreased sequencing ability/decreased step length/decreased weight-shifting ability

## 2021-11-10 NOTE — PHYSICAL THERAPY INITIAL EVALUATION ADULT - ASR WT BEARING STATUS EVAL
MRI Lumbar Spine: Acute compression fracture of superior T12 vertebral body (25% height loss), with disruption of the ossified anterior longitudinal ligament, slight widening of anterior T11-T12 disc space and T11-T12 interspinous ligament edema/injury. Abnormal fluid signal within anterior and posterior T11-T12 disc, with questionable minimal edema within the posterior longitudinal ligament. Findings are consistent with unstable 3 column spinal injury, in the setting of possible ankylosing spondylitis. Bone contusions of T11-T12 spinous processes, with adjacent paraspinal soft tissue contusion. Additional small bone contusion at posterior inferior T11 vertebral body.Small prevertebral edema/hemorrhage at T11-T12. No significant epidural hematoma or spinal canal stenosis. Slight diffuse T1 hypointense marrow signal, nonspecific but may suggest anemia amongst multiple other etiologies such as lymphoproliferative disorder, smoking or obesity. Correlate clinically./Left LE/Right LE
MRI Lumbar Spine: Acute compression fracture of superior T12 vertebral body (25% height loss), with disruption of the ossified anterior longitudinal ligament, slight widening of anterior T11-T12 disc space and T11-T12 interspinous ligament edema/injury. Abnormal fluid signal within anterior and posterior T11-T12 disc, with questionable minimal edema within the posterior longitudinal ligament. Findings are consistent with unstable 3 column spinal injury, in the setting of possible ankylosing spondylitis. Bone contusions of T11-T12 spinous processes, with adjacent paraspinal soft tissue contusion. Additional small bone contusion at posterior inferior T11 vertebral body.Small prevertebral edema/hemorrhage at T11-T12. No significant epidural hematoma or spinal canal stenosis. Slight diffuse T1 hypointense marrow signal, nonspecific but may suggest anemia amongst multiple other etiologies such as lymphoproliferative disorder, smoking or obesity. Correlate clinically./Left LE/Right LE

## 2021-11-10 NOTE — PHYSICAL THERAPY INITIAL EVALUATION ADULT - NS ASR WT BEARING DETAIL LLE
X-ray of ribs:Fracture of the right fifth rib as noted. No pneumothorax./weight-bearing as tolerated
X-ray of ribs: Fracture of the right fifth rib as noted. No pneumothorax./weight-bearing as tolerated

## 2021-11-10 NOTE — PHYSICAL THERAPY INITIAL EVALUATION ADULT - DISCHARGE DISPOSITION, PT EVAL
[facility]rehabilitation facility [facility] recommended, pt authorized for [facility] as per insurance. KEI Martinez aware./rehabilitation facility

## 2021-11-10 NOTE — OCCUPATIONAL THERAPY INITIAL EVALUATION ADULT - PLANNED THERAPY INTERVENTIONS, OT EVAL
ADL retraining/balance training/bed mobility training/transfer training
ADL retraining/bed mobility training/transfer training

## 2021-11-10 NOTE — OCCUPATIONAL THERAPY INITIAL EVALUATION ADULT - DIAGNOSIS, OT EVAL
Patient presents with decreased balance, strength, endurance impacting ability to perform ADLs and functional mobility
Pt presents with decreased strength, balance, ADLs and functional mobility

## 2021-11-10 NOTE — PHYSICAL THERAPY INITIAL EVALUATION ADULT - BALANCE DISTURBANCE, IDENTIFIED IMPAIRMENT CONTRIBUTE, REHAB EVAL
impaired coordination/impaired postural control/decreased strength
impaired coordination/pain/decreased strength

## 2021-11-10 NOTE — OCCUPATIONAL THERAPY INITIAL EVALUATION ADULT - GENERAL OBSERVATIONS, REHAB EVAL
Pt received semisupine in bed, +IV
pt recv'd in chair, +tlso brace, =drain x2. +medina, +pca pump, +piv

## 2021-11-10 NOTE — PHYSICAL THERAPY INITIAL EVALUATION ADULT - PRECAUTIONS/LIMITATIONS, REHAB EVAL
Patient notes that about three weeks ago, she was walking in her home when she tripped over some power cords and landed on her back. She noted immediate back pain but was able to continue walking and performing ADLs. She went to seek medical attention a few days after and was prescribed a muscle relaxant which helped with her pain. However, about a week ago, she tried to stop using her muscle relaxer and began to start experiencing pain in the lower back that would radiate around to the abdomen.
Patient notes that about three weeks ago, she was walking in her home when she tripped over some power cords and landed on her back. She noted immediate back pain but was able to continue walking and performing ADLs. She went to seek medical attention a few days after and was prescribed a muscle relaxant which helped with her pain. However, about a week ago, she tried to stop using her muscle relaxer and began to start experiencing pain in the lower back that would radiate around to the abdomen./fall precautions/spinal precautions

## 2021-11-10 NOTE — PROGRESS NOTE ADULT - SUBJECTIVE AND OBJECTIVE BOX
Spine Progress Note     S: Patient resting comfortably on morning rounds. Pain well-controlled currently. No new complaints. Stable      MEDICATIONS  (STANDING):  acetaminophen     Tablet .. 500 milliGRAM(s) Oral every 6 hours  carBAMazepine 200 milliGRAM(s) Oral two times a day  diVALproex  milliGRAM(s) Oral two times a day  HYDROmorphone PCA (1 mG/mL) 30 milliLiter(s) PCA Continuous PCA Continuous  influenza  Vaccine (HIGH DOSE) 0.7 milliLiter(s) IntraMuscular once  polyethylene glycol 3350 17 Gram(s) Oral daily  senna 2 Tablet(s) Oral at bedtime  sodium chloride 0.9%. 1000 milliLiter(s) (75 mL/Hr) IV Continuous <Continuous>    MEDICATIONS  (PRN):  ketorolac 10 milliGRAM(s) Oral every 6 hours PRN Moderate Pain (4 - 6)  magnesium hydroxide Suspension 30 milliLiter(s) Oral daily PRN Constipation  methocarbamol 750 milliGRAM(s) Oral three times a day PRN Muscle Spasm  naloxone Injectable 0.1 milliGRAM(s) IV Push every 3 minutes PRN For ANY of the following changes in patient status:  A. RR LESS THAN 10 breaths per minute, B. Oxygen saturation LESS THAN 90%, C. Sedation score of 6  ondansetron Injectable 4 milliGRAM(s) IV Push every 6 hours PRN Nausea and/or Vomiting  traMADol 50 milliGRAM(s) Oral every 6 hours PRN Severe Pain (7 - 10)      Physical Exam:        11-08-21 @ 07:01  -  11-09-21 @ 07:00  --------------------------------------------------------  IN: 2280 mL / OUT: 600 mL / NET: 1680 mL    11-09-21 @ 07:01  -  11-10-21 @ 06:49  --------------------------------------------------------  IN: 225 mL / OUT: 400 mL / NET: -175 mL        General: NAD  Resp: Nonlabored  HV x2 bloody output  Dressing c/d/i    BLUE:  Skin Intact  C5-T1 5/5 motor  C5-T1 2/2 SILT  WWP      BLLE:   Skin Intact  L2-S1 motor 5/5  L2-S1 SILT 2/2  WWP       LABS:                        8.6    28.57 )-----------( 433      ( 09 Nov 2021 23:49 )             26.6     11-09    139  |  103  |  12  ----------------------------<  155<H>  4.1   |  22  |  0.61    Ca    7.4<L>      09 Nov 2021 23:49        A/P: 66F s/p T10-L3 PSF after T11-T12 VCF    Neuro: Pain control  Resp: IS  GI: Regular diet, bowel reg  MSK: WBAT, PT/OT, TLSO brace  Heme: DVT PPX: SCDs  Monitor HV  Monitor Suhail

## 2021-11-10 NOTE — CHART NOTE - NSCHARTNOTEFT_GEN_A_CORE
Post Operative Note  Patient: BRIANNE BARNES 66y (1955) Female   MRN: [id number]  Location: [facility] 7TOW 722 W1  Visit: 10-27-21 Inpatient  Date: 11-10-21 @ 02:36    Procedure: S/P T10-L3 posterior spinal fusion    Subjective:   Patient with very poor pain control initially, now on PCA with some improvement.     Objective:  Vitals: T(F): 98.4 (11-10-21 @ 02:20), Max: 98.4 (11-09-21 @ 09:30)  HR: 102 (11-10-21 @ 02:20)  BP: 104/65 (11-10-21 @ 02:20) (89/51 - 134/67)  RR: 17 (11-10-21 @ 02:20)  SpO2: 97% (11-10-21 @ 02:20)  Vent Settings:     In:   11-08-21 @ 07:01  -  11-09-21 @ 07:00  --------------------------------------------------------  IN: 2280 mL    11-09-21 @ 07:01  -  11-10-21 @ 02:36  --------------------------------------------------------  IN: 225 mL      IV Fluids: sodium chloride 0.9%. 1000 milliLiter(s) (75 mL/Hr) IV Continuous <Continuous>      Out:   11-08-21 @ 07:01  -  11-09-21 @ 07:00  --------------------------------------------------------  OUT: 600 mL    11-09-21 @ 07:01  -  11-10-21 @ 02:36  --------------------------------------------------------  OUT: 315 mL      EBL:     Voided Urine:   11-08-21 @ 07:01  -  11-09-21 @ 07:00  --------------------------------------------------------  OUT: 600 mL    11-09-21 @ 07:01  -  11-10-21 @ 02:36  --------------------------------------------------------  OUT: 315 mL      Jang Catheter: yes   Drains:   AXEL:   11-09-21 @ 07:01  -  11-10-21 @ 02:36  --------------------------------------------------------  OUT: 40 mL     ,       Physical Examination:  General: NAD, resting comfortably in bed  Respiratory: Nonlabored respirations, normal CW expansion.  HV x2 with small quantity of sanguinous output  BLE:  IP 5/5 HS 5/5 Q 5/5 GS 5/5 TA 5/5 EHL 5/5   SILT L2-S1  2+ DP/PT pulses  Negative clonus, negative Babinski      Imaging:  No post-op imaging studies    Assessment:  66yFemale patient S/P T10-L3 PSSF for T12 VCF with three column injury    Plan:  - IV Abx: Ancef perior  - Monitor HV  - Pain control PRN  - Labs: AM  - Activity: WBAT, TLSO when OOB  - DVT ppx: venodynes    Date/Time: 11-10-21 @ 02:36

## 2021-11-11 LAB
ANION GAP SERPL CALC-SCNC: 13 MMOL/L — SIGNIFICANT CHANGE UP (ref 5–17)
BUN SERPL-MCNC: 9 MG/DL — SIGNIFICANT CHANGE UP (ref 7–23)
CALCIUM SERPL-MCNC: 8.4 MG/DL — SIGNIFICANT CHANGE UP (ref 8.4–10.5)
CHLORIDE SERPL-SCNC: 102 MMOL/L — SIGNIFICANT CHANGE UP (ref 96–108)
CO2 SERPL-SCNC: 20 MMOL/L — LOW (ref 22–31)
COVID-19 NUCLEOCAPSID GAM AB INTERP: NEGATIVE — SIGNIFICANT CHANGE UP
COVID-19 NUCLEOCAPSID TOTAL GAM ANTIBODY RESULT: 0.08 INDEX — SIGNIFICANT CHANGE UP
CREAT SERPL-MCNC: 0.47 MG/DL — LOW (ref 0.5–1.3)
GLUCOSE SERPL-MCNC: 91 MG/DL — SIGNIFICANT CHANGE UP (ref 70–99)
HCT VFR BLD CALC: 24.4 % — LOW (ref 34.5–45)
HGB BLD-MCNC: 7.8 G/DL — LOW (ref 11.5–15.5)
MCHC RBC-ENTMCNC: 29.9 PG — SIGNIFICANT CHANGE UP (ref 27–34)
MCHC RBC-ENTMCNC: 32 GM/DL — SIGNIFICANT CHANGE UP (ref 32–36)
MCV RBC AUTO: 93.5 FL — SIGNIFICANT CHANGE UP (ref 80–100)
NRBC # BLD: 0 /100 WBCS — SIGNIFICANT CHANGE UP (ref 0–0)
PLATELET # BLD AUTO: 355 K/UL — SIGNIFICANT CHANGE UP (ref 150–400)
POTASSIUM SERPL-MCNC: 4.8 MMOL/L — SIGNIFICANT CHANGE UP (ref 3.5–5.3)
POTASSIUM SERPL-SCNC: 4.8 MMOL/L — SIGNIFICANT CHANGE UP (ref 3.5–5.3)
RBC # BLD: 2.61 M/UL — LOW (ref 3.8–5.2)
RBC # FLD: 14.1 % — SIGNIFICANT CHANGE UP (ref 10.3–14.5)
SARS-COV-2 IGG+IGM SERPL QL IA: 0.08 INDEX — SIGNIFICANT CHANGE UP
SARS-COV-2 IGG+IGM SERPL QL IA: NEGATIVE — SIGNIFICANT CHANGE UP
SODIUM SERPL-SCNC: 135 MMOL/L — SIGNIFICANT CHANGE UP (ref 135–145)
WBC # BLD: 16.79 K/UL — HIGH (ref 3.8–10.5)
WBC # FLD AUTO: 16.79 K/UL — HIGH (ref 3.8–10.5)

## 2021-11-11 RX ORDER — OXYCODONE HYDROCHLORIDE 5 MG/1
10 TABLET ORAL EVERY 4 HOURS
Refills: 0 | Status: DISCONTINUED | OUTPATIENT
Start: 2021-11-11 | End: 2021-11-15

## 2021-11-11 RX ORDER — OXYCODONE HYDROCHLORIDE 5 MG/1
5 TABLET ORAL EVERY 4 HOURS
Refills: 0 | Status: DISCONTINUED | OUTPATIENT
Start: 2021-11-11 | End: 2021-11-15

## 2021-11-11 RX ORDER — HEPARIN SODIUM 5000 [USP'U]/ML
5000 INJECTION INTRAVENOUS; SUBCUTANEOUS EVERY 8 HOURS
Refills: 0 | Status: DISCONTINUED | OUTPATIENT
Start: 2021-11-11 | End: 2021-11-15

## 2021-11-11 RX ADMIN — POLYETHYLENE GLYCOL 3350 17 GRAM(S): 17 POWDER, FOR SOLUTION ORAL at 11:46

## 2021-11-11 RX ADMIN — HEPARIN SODIUM 5000 UNIT(S): 5000 INJECTION INTRAVENOUS; SUBCUTANEOUS at 21:50

## 2021-11-11 RX ADMIN — TRAMADOL HYDROCHLORIDE 50 MILLIGRAM(S): 50 TABLET ORAL at 13:58

## 2021-11-11 RX ADMIN — DIVALPROEX SODIUM 250 MILLIGRAM(S): 500 TABLET, DELAYED RELEASE ORAL at 06:14

## 2021-11-11 RX ADMIN — Medication 500 MILLIGRAM(S): at 17:39

## 2021-11-11 RX ADMIN — Medication 10 MILLIGRAM(S): at 06:44

## 2021-11-11 RX ADMIN — Medication 10 MILLIGRAM(S): at 06:14

## 2021-11-11 RX ADMIN — TRAMADOL HYDROCHLORIDE 50 MILLIGRAM(S): 50 TABLET ORAL at 20:39

## 2021-11-11 RX ADMIN — Medication 500 MILLIGRAM(S): at 17:09

## 2021-11-11 RX ADMIN — Medication 500 MILLIGRAM(S): at 12:16

## 2021-11-11 RX ADMIN — METHOCARBAMOL 750 MILLIGRAM(S): 500 TABLET, FILM COATED ORAL at 11:46

## 2021-11-11 RX ADMIN — Medication 500 MILLIGRAM(S): at 06:44

## 2021-11-11 RX ADMIN — Medication 500 MILLIGRAM(S): at 06:14

## 2021-11-11 RX ADMIN — METHOCARBAMOL 750 MILLIGRAM(S): 500 TABLET, FILM COATED ORAL at 21:27

## 2021-11-11 RX ADMIN — DIVALPROEX SODIUM 250 MILLIGRAM(S): 500 TABLET, DELAYED RELEASE ORAL at 17:09

## 2021-11-11 RX ADMIN — Medication 200 MILLIGRAM(S): at 06:13

## 2021-11-11 RX ADMIN — Medication 500 MILLIGRAM(S): at 11:46

## 2021-11-11 RX ADMIN — Medication 200 MILLIGRAM(S): at 17:09

## 2021-11-11 RX ADMIN — HEPARIN SODIUM 5000 UNIT(S): 5000 INJECTION INTRAVENOUS; SUBCUTANEOUS at 14:00

## 2021-11-11 RX ADMIN — HYDROMORPHONE HYDROCHLORIDE 30 MILLILITER(S): 2 INJECTION INTRAMUSCULAR; INTRAVENOUS; SUBCUTANEOUS at 07:46

## 2021-11-11 RX ADMIN — TRAMADOL HYDROCHLORIDE 50 MILLIGRAM(S): 50 TABLET ORAL at 14:28

## 2021-11-11 RX ADMIN — TRAMADOL HYDROCHLORIDE 50 MILLIGRAM(S): 50 TABLET ORAL at 20:09

## 2021-11-11 NOTE — PROGRESS NOTE ADULT - SUBJECTIVE AND OBJECTIVE BOX
Day 1 of Anesthesia Pain Management Service    SUBJECTIVE: Doing well    Pain Scale Score:	[X] Refer to charted pain scores    THERAPY:    [ ] IV PCA Morphine		        [ ] 5 mg/mL	[ ] 1 mg/mL  [X] IV PCA Hydromorphone	[ ] 5 mg/mL	[X] 1 mg/mL  [ ] IV PCA Fentanyl		        [ ] 50 micrograms/mL    Demand dose: 0.2 mg     Lockout: 6 minutes   Continuous Rate: 0 mg/hr  4 Hour Limit: 4 mg    MEDICATIONS  (STANDING):  acetaminophen     Tablet .. 500 milliGRAM(s) Oral every 6 hours  carBAMazepine 200 milliGRAM(s) Oral two times a day  diVALproex  milliGRAM(s) Oral two times a day  HYDROmorphone PCA (1 mG/mL) 30 milliLiter(s) PCA Continuous PCA Continuous  influenza  Vaccine (HIGH DOSE) 0.7 milliLiter(s) IntraMuscular once  polyethylene glycol 3350 17 Gram(s) Oral daily  senna 2 Tablet(s) Oral at bedtime  sodium chloride 0.9%. 1000 milliLiter(s) (75 mL/Hr) IV Continuous <Continuous>    MEDICATIONS  (PRN):  bisacodyl Suppository 10 milliGRAM(s) Rectal daily PRN If no bowel movement  ketorolac 10 milliGRAM(s) Oral every 6 hours PRN Moderate Pain (4 - 6)  magnesium hydroxide Suspension 30 milliLiter(s) Oral daily PRN Constipation  methocarbamol 750 milliGRAM(s) Oral three times a day PRN Muscle Spasm  naloxone Injectable 0.1 milliGRAM(s) IV Push every 3 minutes PRN For ANY of the following changes in patient status:  A. RR LESS THAN 10 breaths per minute, B. Oxygen saturation LESS THAN 90%, C. Sedation score of 6  ondansetron Injectable 4 milliGRAM(s) IV Push every 6 hours PRN Nausea and/or Vomiting  traMADol 50 milliGRAM(s) Oral every 6 hours PRN Severe Pain (7 - 10)      OBJECTIVE:    Sedation Score:	[ X] Alert	 [ ] Drowsy 	[ ] Arousable	[ ] Asleep	[ ] Unresponsive    Side Effects:	[X ] None	[ ] Nausea	[ ] Vomiting	[ ] Pruritus  		[ ] Other:    Vital Signs Last 24 Hrs  T(C): 37.4 (11 Nov 2021 06:47), Max: 37.4 (11 Nov 2021 06:47)  T(F): 99.4 (11 Nov 2021 06:47), Max: 99.4 (11 Nov 2021 06:47)  HR: 107 (11 Nov 2021 06:47) (89 - 114)  BP: 93/55 (11 Nov 2021 06:47) (93/55 - 104/67)  BP(mean): --  RR: 18 (11 Nov 2021 06:47) (17 - 18)  SpO2: 95% (11 Nov 2021 06:47) (93% - 97%)    ASSESSMENT/ PLAN    Therapy to  be:               [  ] Continued   [X ] Discontinued   [ X] Changed to PRN Analgesics    Documentation and Verification of current medications:   [X] Done	[ ] Not done, not eligible    Comments: D\C PCA and transition to prn analgesics  Day 2 of Anesthesia Pain Management Service    SUBJECTIVE: Doing well    Pain Scale Score:	[X] Refer to charted pain scores    THERAPY:    [ ] IV PCA Morphine		        [ ] 5 mg/mL	[ ] 1 mg/mL  [X] IV PCA Hydromorphone	[ ] 5 mg/mL	[X] 1 mg/mL  [ ] IV PCA Fentanyl		        [ ] 50 micrograms/mL    Demand dose: 0.2 mg     Lockout: 6 minutes   Continuous Rate: 0 mg/hr  4 Hour Limit: 4 mg    MEDICATIONS  (STANDING):  acetaminophen     Tablet .. 500 milliGRAM(s) Oral every 6 hours  carBAMazepine 200 milliGRAM(s) Oral two times a day  diVALproex  milliGRAM(s) Oral two times a day  HYDROmorphone PCA (1 mG/mL) 30 milliLiter(s) PCA Continuous PCA Continuous  influenza  Vaccine (HIGH DOSE) 0.7 milliLiter(s) IntraMuscular once  polyethylene glycol 3350 17 Gram(s) Oral daily  senna 2 Tablet(s) Oral at bedtime  sodium chloride 0.9%. 1000 milliLiter(s) (75 mL/Hr) IV Continuous <Continuous>    MEDICATIONS  (PRN):  bisacodyl Suppository 10 milliGRAM(s) Rectal daily PRN If no bowel movement  ketorolac 10 milliGRAM(s) Oral every 6 hours PRN Moderate Pain (4 - 6)  magnesium hydroxide Suspension 30 milliLiter(s) Oral daily PRN Constipation  methocarbamol 750 milliGRAM(s) Oral three times a day PRN Muscle Spasm  naloxone Injectable 0.1 milliGRAM(s) IV Push every 3 minutes PRN For ANY of the following changes in patient status:  A. RR LESS THAN 10 breaths per minute, B. Oxygen saturation LESS THAN 90%, C. Sedation score of 6  ondansetron Injectable 4 milliGRAM(s) IV Push every 6 hours PRN Nausea and/or Vomiting  traMADol 50 milliGRAM(s) Oral every 6 hours PRN Severe Pain (7 - 10)      OBJECTIVE:    Sedation Score:	[ X] Alert	 [ ] Drowsy 	[ ] Arousable	[ ] Asleep	[ ] Unresponsive    Side Effects:	[X ] None	[ ] Nausea	[ ] Vomiting	[ ] Pruritus  		[ ] Other:    Vital Signs Last 24 Hrs  T(C): 37.4 (11 Nov 2021 06:47), Max: 37.4 (11 Nov 2021 06:47)  T(F): 99.4 (11 Nov 2021 06:47), Max: 99.4 (11 Nov 2021 06:47)  HR: 107 (11 Nov 2021 06:47) (89 - 114)  BP: 93/55 (11 Nov 2021 06:47) (93/55 - 104/67)  BP(mean): --  RR: 18 (11 Nov 2021 06:47) (17 - 18)  SpO2: 95% (11 Nov 2021 06:47) (93% - 97%)    ASSESSMENT/ PLAN    Therapy to  be:               [  ] Continued   [X ] Discontinued   [ X] Changed to PRN Analgesics    Documentation and Verification of current medications:   [X] Done	[ ] Not done, not eligible    Comments: D\C PCA and transition to prn analgesics

## 2021-11-11 NOTE — PROVIDER CONTACT NOTE (OTHER) - ASSESSMENT
aox4, 37.4, , 93/55, RR 18, 95% RA. Pt aox4, 37.4, , 93/55, RR 18, 95% RA. No c/o chest pain, SOB, dizziness. Pt otherwise asymptomatic

## 2021-11-11 NOTE — PROGRESS NOTE ADULT - SUBJECTIVE AND OBJECTIVE BOX
ORTHO  Patient is a 66y old  Female who presents with a chief complaint of T12 Vertebral Compression Fracture w/ T11-12 3 column injury (09 Nov 2021 06:26)    Pt. resting with complaint Mod- Severe pain min relief at present, PCA    VS-  T(C): 37.4 (11-11-21 @ 06:47), Max: 37.4 (11-11-21 @ 06:47)  HR: 107 (11-11-21 @ 06:47) (84 - 114)  BP: 93/55 (11-11-21 @ 06:47) (93/55 - 106/69)  RR: 18 (11-11-21 @ 06:47) (17 - 18)  SpO2: 95% (11-11-21 @ 06:47) (93% - 97%)  Wt(kg): --    M.S.  A&O  Back dressing C/D/I, Hemovac drain upper- 0 cc/shift, lower 120 cc/shift  Neuro-              Motor- (+)ROM ankle & EHL 5/5              Sensation- grossly intact to light touch              Calves- soft, nontender- PAS                               8.6    28.57 )-----------( 433      ( 09 Nov 2021 23:49 )             26.6     11-09    139  |  103  |  12  ----------------------------<  155<H>  4.1   |  22  |  0.61    Ca    7.4<L>      09 Nov 2021 23:49

## 2021-11-12 LAB
ANION GAP SERPL CALC-SCNC: 13 MMOL/L — SIGNIFICANT CHANGE UP (ref 5–17)
BLD GP AB SCN SERPL QL: NEGATIVE — SIGNIFICANT CHANGE UP
BUN SERPL-MCNC: 6 MG/DL — LOW (ref 7–23)
CALCIUM SERPL-MCNC: 8.4 MG/DL — SIGNIFICANT CHANGE UP (ref 8.4–10.5)
CHLORIDE SERPL-SCNC: 99 MMOL/L — SIGNIFICANT CHANGE UP (ref 96–108)
CO2 SERPL-SCNC: 25 MMOL/L — SIGNIFICANT CHANGE UP (ref 22–31)
CREAT SERPL-MCNC: 0.46 MG/DL — LOW (ref 0.5–1.3)
GLUCOSE SERPL-MCNC: 100 MG/DL — HIGH (ref 70–99)
HCT VFR BLD CALC: 28.3 % — LOW (ref 34.5–45)
HGB BLD-MCNC: 9.1 G/DL — LOW (ref 11.5–15.5)
MCHC RBC-ENTMCNC: 29.2 PG — SIGNIFICANT CHANGE UP (ref 27–34)
MCHC RBC-ENTMCNC: 32.2 GM/DL — SIGNIFICANT CHANGE UP (ref 32–36)
MCV RBC AUTO: 90.7 FL — SIGNIFICANT CHANGE UP (ref 80–100)
NRBC # BLD: 0 /100 WBCS — SIGNIFICANT CHANGE UP (ref 0–0)
PLATELET # BLD AUTO: 323 K/UL — SIGNIFICANT CHANGE UP (ref 150–400)
POTASSIUM SERPL-MCNC: 4.2 MMOL/L — SIGNIFICANT CHANGE UP (ref 3.5–5.3)
POTASSIUM SERPL-SCNC: 4.2 MMOL/L — SIGNIFICANT CHANGE UP (ref 3.5–5.3)
RBC # BLD: 3.12 M/UL — LOW (ref 3.8–5.2)
RBC # FLD: 16 % — HIGH (ref 10.3–14.5)
RH IG SCN BLD-IMP: POSITIVE — SIGNIFICANT CHANGE UP
SODIUM SERPL-SCNC: 137 MMOL/L — SIGNIFICANT CHANGE UP (ref 135–145)
WBC # BLD: 11.37 K/UL — HIGH (ref 3.8–10.5)
WBC # FLD AUTO: 11.37 K/UL — HIGH (ref 3.8–10.5)

## 2021-11-12 RX ORDER — HYDROMORPHONE HYDROCHLORIDE 2 MG/ML
1 INJECTION INTRAMUSCULAR; INTRAVENOUS; SUBCUTANEOUS THREE TIMES A DAY
Refills: 0 | Status: DISCONTINUED | OUTPATIENT
Start: 2021-11-12 | End: 2021-11-12

## 2021-11-12 RX ORDER — LANOLIN ALCOHOL/MO/W.PET/CERES
3 CREAM (GRAM) TOPICAL AT BEDTIME
Refills: 0 | Status: DISCONTINUED | OUTPATIENT
Start: 2021-11-12 | End: 2021-11-15

## 2021-11-12 RX ADMIN — HEPARIN SODIUM 5000 UNIT(S): 5000 INJECTION INTRAVENOUS; SUBCUTANEOUS at 14:40

## 2021-11-12 RX ADMIN — Medication 500 MILLIGRAM(S): at 00:40

## 2021-11-12 RX ADMIN — OXYCODONE HYDROCHLORIDE 10 MILLIGRAM(S): 5 TABLET ORAL at 00:09

## 2021-11-12 RX ADMIN — Medication 200 MILLIGRAM(S): at 05:27

## 2021-11-12 RX ADMIN — DIVALPROEX SODIUM 250 MILLIGRAM(S): 500 TABLET, DELAYED RELEASE ORAL at 05:27

## 2021-11-12 RX ADMIN — OXYCODONE HYDROCHLORIDE 10 MILLIGRAM(S): 5 TABLET ORAL at 20:22

## 2021-11-12 RX ADMIN — TRAMADOL HYDROCHLORIDE 50 MILLIGRAM(S): 50 TABLET ORAL at 04:32

## 2021-11-12 RX ADMIN — TRAMADOL HYDROCHLORIDE 50 MILLIGRAM(S): 50 TABLET ORAL at 04:02

## 2021-11-12 RX ADMIN — Medication 3 MILLIGRAM(S): at 23:01

## 2021-11-12 RX ADMIN — OXYCODONE HYDROCHLORIDE 10 MILLIGRAM(S): 5 TABLET ORAL at 09:43

## 2021-11-12 RX ADMIN — OXYCODONE HYDROCHLORIDE 10 MILLIGRAM(S): 5 TABLET ORAL at 14:39

## 2021-11-12 RX ADMIN — Medication 500 MILLIGRAM(S): at 05:57

## 2021-11-12 RX ADMIN — OXYCODONE HYDROCHLORIDE 10 MILLIGRAM(S): 5 TABLET ORAL at 15:10

## 2021-11-12 RX ADMIN — OXYCODONE HYDROCHLORIDE 10 MILLIGRAM(S): 5 TABLET ORAL at 05:43

## 2021-11-12 RX ADMIN — OXYCODONE HYDROCHLORIDE 10 MILLIGRAM(S): 5 TABLET ORAL at 06:13

## 2021-11-12 RX ADMIN — Medication 500 MILLIGRAM(S): at 17:58

## 2021-11-12 RX ADMIN — OXYCODONE HYDROCHLORIDE 10 MILLIGRAM(S): 5 TABLET ORAL at 00:40

## 2021-11-12 RX ADMIN — Medication 500 MILLIGRAM(S): at 00:10

## 2021-11-12 RX ADMIN — Medication 500 MILLIGRAM(S): at 23:52

## 2021-11-12 RX ADMIN — Medication 500 MILLIGRAM(S): at 23:22

## 2021-11-12 RX ADMIN — Medication 500 MILLIGRAM(S): at 05:27

## 2021-11-12 RX ADMIN — HEPARIN SODIUM 5000 UNIT(S): 5000 INJECTION INTRAVENOUS; SUBCUTANEOUS at 05:27

## 2021-11-12 RX ADMIN — Medication 500 MILLIGRAM(S): at 18:30

## 2021-11-12 RX ADMIN — OXYCODONE HYDROCHLORIDE 10 MILLIGRAM(S): 5 TABLET ORAL at 10:13

## 2021-11-12 RX ADMIN — Medication 200 MILLIGRAM(S): at 17:58

## 2021-11-12 RX ADMIN — SENNA PLUS 2 TABLET(S): 8.6 TABLET ORAL at 22:49

## 2021-11-12 RX ADMIN — OXYCODONE HYDROCHLORIDE 10 MILLIGRAM(S): 5 TABLET ORAL at 19:52

## 2021-11-12 RX ADMIN — HEPARIN SODIUM 5000 UNIT(S): 5000 INJECTION INTRAVENOUS; SUBCUTANEOUS at 22:48

## 2021-11-12 RX ADMIN — DIVALPROEX SODIUM 250 MILLIGRAM(S): 500 TABLET, DELAYED RELEASE ORAL at 17:58

## 2021-11-12 RX ADMIN — POLYETHYLENE GLYCOL 3350 17 GRAM(S): 17 POWDER, FOR SOLUTION ORAL at 11:15

## 2021-11-12 NOTE — PROGRESS NOTE ADULT - SUBJECTIVE AND OBJECTIVE BOX
Patient is a 66y old  Female who presents with a chief complaint of Vertebral compression fracture T12, with column injury T11-T12  Patient s/p T10-L3 PSF POD#3  Patient comfortable  No complaints    T(C): 36.9 (11-12-21 @ 04:41), Max: 37.1 (11-12-21 @ 00:13)  HR: 93 (11-12-21 @ 04:41) (90 - 115)  BP: 123/66 (11-12-21 @ 04:41) (104/62 - 123/66)  RR: 18 (11-12-21 @ 04:41) (18 - 18)  SpO2: 97% (11-12-21 @ 04:41) (93% - 99%)  Wt(kg): --    PHYSICAL EXAM:  NAD, Alert  Back: Dressing C/D/I, HMV#1 0/0, #2-0/110cc,  (+) Distal Pulses; No Calf tenderness B/L, PAS       [ ] Lower extremeity                  PF          DF         EHL       FHL                                                                                            R        5/5        5/5        5/5       5/5                                                        L         5/5        5/5        5/5       5/5    LABS:                      7.8    16.79 )-----------( 355      ( 11 Nov 2021 15:00 )             24.4   11-11  135  |  102  |  9   ----------------------------<  91  4.8   |  20<L>  |  0.47<L>  Ca    8.4      11 Nov 2021 07:14

## 2021-11-12 NOTE — PROGRESS NOTE ADULT - SUBJECTIVE AND OBJECTIVE BOX
66F hx breast CA in remission, benign brain CA S/p excision, skin CA, seizure presented to [facility] ED with back pain, transferred to [facility] per family request. Patient is a community ambulator with cane and lives with her son and daughter in law. Patient notes that about three weeks ago, she was walking in her home when she tripped over some power cords and landed on her back. She noted immediate back pain but was able to continue walking and performing ADLs. She went to seek medical attention a few days after and was prescribed a muscle relaxant which helped with her pain. However, about a week ago, she tried to stop using her muscle relaxer and began to start experiencing pain in the lower back that would radiate around to the abdomen. She made an appointment with Dr. Link [facility] for next Tuesday, but the pain became so bad that she had to come to the ED. Patient denies leg pain, saddle anesthesia, bowel or bladder incontinence, numbness, tingling, weakness, or any other orthopaedic complaint. Patient seen now lying bed resting comfortably. On MRI Patient found to have unstable 3 column injury to the spine injury associated with a vertebral compression fracture at T12. Patient has been wearing a TLSO brace and states pain has been controlled. Has started working with physical therapy. Patient states pain has been well controlled. Has been working with physical therapy. Patient is s/p T10-L3 posterior fusion Tolerated the procedure well. Pain has been managed.      MEDICATIONS  (STANDING):  acetaminophen     Tablet .. 500 milliGRAM(s) Oral every 6 hours  carBAMazepine 200 milliGRAM(s) Oral two times a day  diVALproex  milliGRAM(s) Oral two times a day  heparin   Injectable 5000 Unit(s) SubCutaneous every 8 hours  influenza  Vaccine (HIGH DOSE) 0.7 milliLiter(s) IntraMuscular once  polyethylene glycol 3350 17 Gram(s) Oral daily  senna 2 Tablet(s) Oral at bedtime  sodium chloride 0.9%. 1000 milliLiter(s) (75 mL/Hr) IV Continuous <Continuous>    MEDICATIONS  (PRN):  bisacodyl Suppository 10 milliGRAM(s) Rectal daily PRN If no bowel movement  ketorolac 10 milliGRAM(s) Oral every 6 hours PRN Moderate Pain (4 - 6)  magnesium hydroxide Suspension 30 milliLiter(s) Oral daily PRN Constipation  methocarbamol 750 milliGRAM(s) Oral three times a day PRN Muscle Spasm  ondansetron Injectable 4 milliGRAM(s) IV Push every 6 hours PRN Nausea and/or Vomiting  oxyCODONE    IR 10 milliGRAM(s) Oral every 4 hours PRN Severe Pain (7 - 10)  oxyCODONE    IR 5 milliGRAM(s) Oral every 4 hours PRN Moderate Pain (4 - 6)  traMADol 50 milliGRAM(s) Oral every 6 hours PRN Mild Pain (1 - 3)          VITALS:   T(C): 37 (11-12-21 @ 12:40), Max: 37.1 (11-12-21 @ 00:13)  HR: 103 (11-12-21 @ 14:59) (84 - 103)  BP: 127/71 (11-12-21 @ 14:59) (117/64 - 132/74)  RR: 18 (11-12-21 @ 12:40) (18 - 18)  SpO2: 97% (11-12-21 @ 12:40) (93% - 100%)  Wt(kg): --    PHYSICAL EXAM  well-groomed, well-developed  HEAD:  Atraumatic, Normocephalic  EYES: EOMI, PERRLA, conjunctiva and sclera clear  ENMT: No tonsillar erythema, exudates, or enlargement; Moist mucous membranes, Good dentition, No lesions  NECK: decreased ROM  NERVOUS SYSTEM:  Alert & Oriented X3, Good concentration; Motor Strength 5/5 B/L upper and lower extremities; DTRs 2+ intact and symmetric  CHEST/LUNG: Clear to percussion bilaterally; No rales, rhonchi, wheezing, or rubs  HEART: Regular rate and rhythm; No murmurs, rubs, or gallops  ABDOMEN: Soft, Nontender, Nondistended; Bowel sounds present  EXTREMITIES:  2+ Peripheral Pulses, No clubbing, cyanosis, or edema  LYMPH: No lymphadenopathy noted  SKIN: No rashes or lesions      LABS:        CBC Full  -  ( 12 Nov 2021 10:13 )  WBC Count : 11.37 K/uL  RBC Count : 3.12 M/uL  Hemoglobin : 9.1 g/dL  Hematocrit : 28.3 %  Platelet Count - Automated : 323 K/uL  Mean Cell Volume : 90.7 fl  Mean Cell Hemoglobin : 29.2 pg  Mean Cell Hemoglobin Concentration : 32.2 gm/dL  Auto Neutrophil # : x  Auto Lymphocyte # : x  Auto Monocyte # : x  Auto Eosinophil # : x  Auto Basophil # : x  Auto Neutrophil % : x  Auto Lymphocyte % : x  Auto Monocyte % : x  Auto Eosinophil % : x  Auto Basophil % : x    11-12    137  |  99  |  6<L>  ----------------------------<  100<H>  4.2   |  25  |  0.46<L>    Ca    8.4      12 Nov 2021 10:13            CAPILLARY BLOOD GLUCOSE          RADIOLOGY & ADDITIONAL TESTS:

## 2021-11-12 NOTE — PROGRESS NOTE ADULT - REASON FOR ADMISSION
Intractable mid and lower back pain.
T12 Vertebral Compression Fracture w/ T11-12 3 column injury
T12 VCF w/ T11-T12 3 column injury
T12 VCF, with T-11-12 3 column injury
T12 VCF, with T11-12 3 column injury
Vertebral compression fracture T12, with column injury T11-T12
Intractable mid and lower back pain.
Spine injury
T12 Vertebral compression Fx w/ T11-T12 3 Column Injury
Vertebral compression fracture T12, with column injury T11-T12
Vertebral compression fracture T12, with column injury T11-T12
T12 VCF with T11-T12 3 column injury
Back Pain

## 2021-11-12 NOTE — CHART NOTE - NSCHARTNOTEFT_GEN_A_CORE
Patient visited for drain pull. Sutures d/c'd.  Hemostasis achieved, patient tolerated well, tip intact. 4x4 Dsg/tegaderm and tape placed. 1 HV drain remaining.    Ana Venegas PA-C  Team Pager: #[phone] Patient visited for superior HV drain pull. Noticed that inferior HV drain was self d/c'd and dressings were soiled. Both HV's d/c'd, new dressings applied, sutures removed, all tips intact.     Ana Venegas PA-C  Team Pager: #[phone]

## 2021-11-13 RX ORDER — MULTIVIT WITH MIN/MFOLATE/K2 340-15/3 G
1 POWDER (GRAM) ORAL ONCE
Refills: 0 | Status: COMPLETED | OUTPATIENT
Start: 2021-11-13 | End: 2021-11-13

## 2021-11-13 RX ADMIN — Medication 10 MILLIGRAM(S): at 22:45

## 2021-11-13 RX ADMIN — Medication 500 MILLIGRAM(S): at 06:34

## 2021-11-13 RX ADMIN — Medication 500 MILLIGRAM(S): at 14:01

## 2021-11-13 RX ADMIN — Medication 500 MILLIGRAM(S): at 12:08

## 2021-11-13 RX ADMIN — POLYETHYLENE GLYCOL 3350 17 GRAM(S): 17 POWDER, FOR SOLUTION ORAL at 12:08

## 2021-11-13 RX ADMIN — Medication 1 BOTTLE: at 20:13

## 2021-11-13 RX ADMIN — Medication 500 MILLIGRAM(S): at 07:02

## 2021-11-13 RX ADMIN — Medication 500 MILLIGRAM(S): at 17:36

## 2021-11-13 RX ADMIN — Medication 500 MILLIGRAM(S): at 23:05

## 2021-11-13 RX ADMIN — Medication 200 MILLIGRAM(S): at 17:37

## 2021-11-13 RX ADMIN — DIVALPROEX SODIUM 250 MILLIGRAM(S): 500 TABLET, DELAYED RELEASE ORAL at 17:36

## 2021-11-13 RX ADMIN — Medication 1 ENEMA: at 14:06

## 2021-11-13 RX ADMIN — HEPARIN SODIUM 5000 UNIT(S): 5000 INJECTION INTRAVENOUS; SUBCUTANEOUS at 14:05

## 2021-11-13 RX ADMIN — Medication 500 MILLIGRAM(S): at 18:05

## 2021-11-13 RX ADMIN — HEPARIN SODIUM 5000 UNIT(S): 5000 INJECTION INTRAVENOUS; SUBCUTANEOUS at 21:46

## 2021-11-13 RX ADMIN — HEPARIN SODIUM 5000 UNIT(S): 5000 INJECTION INTRAVENOUS; SUBCUTANEOUS at 05:58

## 2021-11-13 RX ADMIN — Medication 500 MILLIGRAM(S): at 23:35

## 2021-11-13 RX ADMIN — Medication 500 MILLIGRAM(S): at 06:04

## 2021-11-13 RX ADMIN — Medication 3 MILLIGRAM(S): at 23:05

## 2021-11-13 RX ADMIN — DIVALPROEX SODIUM 250 MILLIGRAM(S): 500 TABLET, DELAYED RELEASE ORAL at 05:58

## 2021-11-13 RX ADMIN — Medication 200 MILLIGRAM(S): at 05:58

## 2021-11-13 RX ADMIN — OXYCODONE HYDROCHLORIDE 10 MILLIGRAM(S): 5 TABLET ORAL at 01:26

## 2021-11-13 RX ADMIN — MAGNESIUM HYDROXIDE 30 MILLILITER(S): 400 TABLET, CHEWABLE ORAL at 05:58

## 2021-11-13 RX ADMIN — OXYCODONE HYDROCHLORIDE 10 MILLIGRAM(S): 5 TABLET ORAL at 00:56

## 2021-11-13 NOTE — STUDENT SIGN OFF DOCUMENT - STUDENT DOCUMENT REVIEW
Sukhi Graves 
Gunnar Allen 
Sukhi Graves

## 2021-11-13 NOTE — PROVIDER CONTACT NOTE (OTHER) - ASSESSMENT
Pt remains A&Ox4, VSS. Pt c/o abdominal pain, states her last BM was 11/7/2021. Positive bowel sounds auscultated in all four quadrants. Pt passing Flatus. Abdomen non tender to touch. Pt received multiple bowel medications with no BM noted.

## 2021-11-13 NOTE — PROGRESS NOTE ADULT - SUBJECTIVE AND OBJECTIVE BOX
Patient is a 66y old  Female who presents with a chief complaint of T12 VCF, with T-11-12 3 column injury (12 Nov 2021 06:56)      POST OPERATIVE DAY #: 4  Patient c/o constipation       T(C): 36.9 (11-13-21 @ 04:56), Max: 37.1 (11-12-21 @ 20:58)  HR: 86 (11-13-21 @ 04:56) (84 - 104)  BP: 114/73 (11-13-21 @ 04:56) (114/73 - 132/74)  RR: 18 (11-13-21 @ 04:56) (18 - 18)  SpO2: 98% (11-13-21 @ 04:56) (96% - 100%)  Wt(kg): --    PHYSICAL EXAM:  NAD, Alert  Back: Dressing C/D/I; sensation grossly intact to light touch; (+) Distal Pulses; No Calf tenderness B/L, PAS              [ ] Lower extremeity                  PF          DF         EHL       FHL                                                                                            R        5/5        5/5        5/5       5/5                                                        L         5/5        5/5        5/5       5/5      LABS:                        9.1    11.37 )-----------( 323      ( 12 Nov 2021 10:13 )             28.3     11-12    137  |  99  |  6<L>  ----------------------------<  100<H>  4.2   |  25  |  0.46<L>    Ca    8.4      12 Nov 2021 10:13

## 2021-11-13 NOTE — PROGRESS NOTE ADULT - SUBJECTIVE AND OBJECTIVE BOX
66F hx breast CA in remission, benign brain CA S/p excision, skin CA, seizure presented to [facility] ED with back pain, transferred to [facility] per family request. Patient is a community ambulator with cane and lives with her son and daughter in law. Patient notes that about three weeks ago, she was walking in her home when she tripped over some power cords and landed on her back. She noted immediate back pain but was able to continue walking and performing ADLs. She went to seek medical attention a few days after and was prescribed a muscle relaxant which helped with her pain. However, about a week ago, she tried to stop using her muscle relaxer and began to start experiencing pain in the lower back that would radiate around to the abdomen. She made an appointment with Dr. [facility] for next Tuesday, but the pain became so bad that she had to come to the ED. Patient denies leg pain, saddle anesthesia, bowel or bladder incontinence, numbness, tingling, weakness, or any other orthopaedic complaint. Patient seen now lying bed resting comfortably. On MRI Patient found to have unstable 3 column injury to the spine injury associated with a vertebral compression fracture at T12. Patient has been wearing a TLSO brace and states pain has been controlled. Has started working with physical therapy. Patient states pain has been well controlled. Has been working with physical therapy. Patient is s/p T10-L3 posterior fusion Tolerated the procedure well. Pain has been managed. Pt with complaint of constipation       MEDICATIONS  (STANDING):  acetaminophen     Tablet .. 500 milliGRAM(s) Oral every 6 hours  carBAMazepine 200 milliGRAM(s) Oral two times a day  diVALproex  milliGRAM(s) Oral two times a day  heparin   Injectable 5000 Unit(s) SubCutaneous every 8 hours  influenza  Vaccine (HIGH DOSE) 0.7 milliLiter(s) IntraMuscular once  polyethylene glycol 3350 17 Gram(s) Oral daily  senna 2 Tablet(s) Oral at bedtime  sodium chloride 0.9%. 1000 milliLiter(s) (75 mL/Hr) IV Continuous <Continuous>    MEDICATIONS  (PRN):  bisacodyl Suppository 10 milliGRAM(s) Rectal daily PRN If no bowel movement  ketorolac 10 milliGRAM(s) Oral every 6 hours PRN Moderate Pain (4 - 6)  magnesium hydroxide Suspension 30 milliLiter(s) Oral daily PRN Constipation  melatonin 3 milliGRAM(s) Oral at bedtime PRN Insomnia  methocarbamol 750 milliGRAM(s) Oral three times a day PRN Muscle Spasm  ondansetron Injectable 4 milliGRAM(s) IV Push every 6 hours PRN Nausea and/or Vomiting  oxyCODONE    IR 10 milliGRAM(s) Oral every 4 hours PRN Severe Pain (7 - 10)  oxyCODONE    IR 5 milliGRAM(s) Oral every 4 hours PRN Moderate Pain (4 - 6)  traMADol 50 milliGRAM(s) Oral every 6 hours PRN Mild Pain (1 - 3)          VITALS:   T(C): 36.9 (11-13-21 @ 12:12), Max: 37.1 (11-12-21 @ 20:58)  HR: 90 (11-13-21 @ 12:12) (60 - 104)  BP: 129/73 (11-13-21 @ 12:12) (114/73 - 131/82)  RR: 18 (11-13-21 @ 12:12) (18 - 18)  SpO2: 98% (11-13-21 @ 12:12) (96% - 98%)  Wt(kg): --    PHYSICAL EXAM  well-groomed, well-developed  HEAD:  Atraumatic, Normocephalic  EYES: EOMI, PERRLA, conjunctiva and sclera clear  ENMT: No tonsillar erythema, exudates, or enlargement; Moist mucous membranes, Good dentition, No lesions  NECK: decreased ROM  NERVOUS SYSTEM:  Alert & Oriented X3, Good concentration; Motor Strength 5/5 B/L upper and lower extremities; DTRs 2+ intact and symmetric  CHEST/LUNG: Clear to percussion bilaterally; No rales, rhonchi, wheezing, or rubs  HEART: Regular rate and rhythm; No murmurs, rubs, or gallops  ABDOMEN: Soft, Nontender, Nondistended; Bowel sounds present  EXTREMITIES:  2+ Peripheral Pulses, No clubbing, cyanosis, or edema  LYMPH: No lymphadenopathy noted  SKIN: No rashes or lesions    LABS:        CBC Full  -  ( 12 Nov 2021 10:13 )  WBC Count : 11.37 K/uL  RBC Count : 3.12 M/uL  Hemoglobin : 9.1 g/dL  Hematocrit : 28.3 %  Platelet Count - Automated : 323 K/uL  Mean Cell Volume : 90.7 fl  Mean Cell Hemoglobin : 29.2 pg  Mean Cell Hemoglobin Concentration : 32.2 gm/dL  Auto Neutrophil # : x  Auto Lymphocyte # : x  Auto Monocyte # : x  Auto Eosinophil # : x  Auto Basophil # : x  Auto Neutrophil % : x  Auto Lymphocyte % : x  Auto Monocyte % : x  Auto Eosinophil % : x  Auto Basophil % : x    11-12    137  |  99  |  6<L>  ----------------------------<  100<H>  4.2   |  25  |  0.46<L>    Ca    8.4      12 Nov 2021 10:13            CAPILLARY BLOOD GLUCOSE          RADIOLOGY & ADDITIONAL TESTS:

## 2021-11-13 NOTE — STUDENT SIGN OFF DOCUMENT - CO-SIGNATURE DATE
02-Nov-2021
04-Nov-2021
05-Nov-2021 15:22
10-Nov-2021
11-Nov-2021
12-Nov-2021
12-Nov-2021
13-Nov-2021
29-Oct-2021
11-Nov-2021

## 2021-11-13 NOTE — STUDENT SIGN OFF DOCUMENT - COPY OF STUDENT DOCUMENT REVIEW
Physical Therapy 
occupational therapy 
Physical Therapy

## 2021-11-13 NOTE — PROVIDER CONTACT NOTE (OTHER) - ASSESSMENT
Pt remains A&Ox4, VSS. Pt had 1 episode of vomiting after drinking Magnesium  Citrate. Pt HOB was increased to 90 degrees.

## 2021-11-14 LAB — SARS-COV-2 RNA SPEC QL NAA+PROBE: SIGNIFICANT CHANGE UP

## 2021-11-14 RX ORDER — SIMETHICONE 80 MG/1
80 TABLET, CHEWABLE ORAL ONCE
Refills: 0 | Status: COMPLETED | OUTPATIENT
Start: 2021-11-14 | End: 2021-11-14

## 2021-11-14 RX ADMIN — DIVALPROEX SODIUM 250 MILLIGRAM(S): 500 TABLET, DELAYED RELEASE ORAL at 05:26

## 2021-11-14 RX ADMIN — SENNA PLUS 2 TABLET(S): 8.6 TABLET ORAL at 22:00

## 2021-11-14 RX ADMIN — SENNA PLUS 2 TABLET(S): 8.6 TABLET ORAL at 05:26

## 2021-11-14 RX ADMIN — Medication 200 MILLIGRAM(S): at 05:26

## 2021-11-14 RX ADMIN — Medication 500 MILLIGRAM(S): at 11:45

## 2021-11-14 RX ADMIN — Medication 10 MILLIGRAM(S): at 22:00

## 2021-11-14 RX ADMIN — Medication 10 MILLIGRAM(S): at 22:30

## 2021-11-14 RX ADMIN — Medication 200 MILLIGRAM(S): at 17:16

## 2021-11-14 RX ADMIN — SIMETHICONE 80 MILLIGRAM(S): 80 TABLET, CHEWABLE ORAL at 17:15

## 2021-11-14 RX ADMIN — HEPARIN SODIUM 5000 UNIT(S): 5000 INJECTION INTRAVENOUS; SUBCUTANEOUS at 05:25

## 2021-11-14 RX ADMIN — Medication 500 MILLIGRAM(S): at 12:15

## 2021-11-14 RX ADMIN — HEPARIN SODIUM 5000 UNIT(S): 5000 INJECTION INTRAVENOUS; SUBCUTANEOUS at 13:40

## 2021-11-14 RX ADMIN — DIVALPROEX SODIUM 250 MILLIGRAM(S): 500 TABLET, DELAYED RELEASE ORAL at 17:16

## 2021-11-14 RX ADMIN — Medication 500 MILLIGRAM(S): at 06:32

## 2021-11-14 RX ADMIN — Medication 500 MILLIGRAM(S): at 17:16

## 2021-11-14 RX ADMIN — HEPARIN SODIUM 5000 UNIT(S): 5000 INJECTION INTRAVENOUS; SUBCUTANEOUS at 22:00

## 2021-11-14 RX ADMIN — Medication 500 MILLIGRAM(S): at 17:46

## 2021-11-14 NOTE — PROGRESS NOTE ADULT - SUBJECTIVE AND OBJECTIVE BOX
66F hx breast CA in remission, benign brain CA S/p excision, skin CA, seizure presented to [facility] ED with back pain, transferred to [facility] per family request. Patient is a community ambulator with cane and lives with her son and daughter in law. Patient notes that about three weeks ago, she was walking in her home when she tripped over some power cords and landed on her back. She noted immediate back pain but was able to continue walking and performing ADLs. She went to seek medical attention a few days after and was prescribed a muscle relaxant which helped with her pain. However, about a week ago, she tried to stop using her muscle relaxer and began to start experiencing pain in the lower back that would radiate around to the abdomen. She made an appointment with Dr. [facility] for next Tuesday, but the pain became so bad that she had to come to the ED. Patient denies leg pain, saddle anesthesia, bowel or bladder incontinence, numbness, tingling, weakness, or any other orthopaedic complaint. Patient seen now lying bed resting comfortably. On MRI Patient found to have unstable 3 column injury to the spine injury associated with a vertebral compression fracture at T12. Patient has been wearing a TLSO brace and states pain has been controlled. Has started working with physical therapy. Patient states pain has been well controlled. Has been working with physical therapy. Patient is s/p T10-L3 posterior fusion Tolerated the procedure well. Pain has been managed. Pt with complaint of constipation. Some improvement after  a suppository     MEDICATIONS  (STANDING):  acetaminophen     Tablet .. 500 milliGRAM(s) Oral every 6 hours  carBAMazepine 200 milliGRAM(s) Oral two times a day  diVALproex  milliGRAM(s) Oral two times a day  heparin   Injectable 5000 Unit(s) SubCutaneous every 8 hours  influenza  Vaccine (HIGH DOSE) 0.7 milliLiter(s) IntraMuscular once  polyethylene glycol 3350 17 Gram(s) Oral daily  senna 2 Tablet(s) Oral at bedtime  sodium chloride 0.9%. 1000 milliLiter(s) (75 mL/Hr) IV Continuous <Continuous>    MEDICATIONS  (PRN):  bisacodyl Suppository 10 milliGRAM(s) Rectal daily PRN If no bowel movement  ketorolac 10 milliGRAM(s) Oral every 6 hours PRN Moderate Pain (4 - 6)  magnesium hydroxide Suspension 30 milliLiter(s) Oral daily PRN Constipation  melatonin 3 milliGRAM(s) Oral at bedtime PRN Insomnia  methocarbamol 750 milliGRAM(s) Oral three times a day PRN Muscle Spasm  ondansetron Injectable 4 milliGRAM(s) IV Push every 6 hours PRN Nausea and/or Vomiting  oxyCODONE    IR 10 milliGRAM(s) Oral every 4 hours PRN Severe Pain (7 - 10)  oxyCODONE    IR 5 milliGRAM(s) Oral every 4 hours PRN Moderate Pain (4 - 6)  traMADol 50 milliGRAM(s) Oral every 6 hours PRN Mild Pain (1 - 3)          VITALS:   T(C): 36.5 (11-14-21 @ 09:39), Max: 36.7 (11-14-21 @ 05:28)  HR: 89 (11-14-21 @ 09:39) (89 - 92)  BP: 128/85 (11-14-21 @ 09:39) (127/93 - 128/85)  RR: 18 (11-14-21 @ 09:39) (18 - 18)  SpO2: 99% (11-14-21 @ 09:39) (99% - 99%)  Wt(kg): --    PHYSICAL EXAM  well-groomed, well-developed  HEAD:  Atraumatic, Normocephalic  EYES: EOMI, PERRLA, conjunctiva and sclera clear  ENMT: No tonsillar erythema, exudates, or enlargement; Moist mucous membranes, Good dentition, No lesions  NECK: decreased ROM  NERVOUS SYSTEM:  Alert & Oriented X3, Good concentration; Motor Strength 5/5 B/L upper and lower extremities; DTRs 2+ intact and symmetric  CHEST/LUNG: Clear to percussion bilaterally; No rales, rhonchi, wheezing, or rubs  HEART: Regular rate and rhythm; No murmurs, rubs, or gallops  ABDOMEN: Soft, Nontender, Nondistended; Bowel sounds present  EXTREMITIES:  2+ Peripheral Pulses, No clubbing, cyanosis, or edema  LYMPH: No lymphadenopathy noted  SKIN: No rashes or lesions    LABS:                      CAPILLARY BLOOD GLUCOSE          RADIOLOGY & ADDITIONAL TESTS:

## 2021-11-14 NOTE — PROGRESS NOTE ADULT - SUBJECTIVE AND OBJECTIVE BOX
Patient is a 66y old  Female who presents with a chief complaint of T12 VCF, with T-11-12 3 column injury (12 Nov 2021 06:56)      POST OPERATIVE DAY #: 5  Patient c/o mild abdominal discomfort,  (+) BM yesterday    T(C): 36.7 (11-14-21 @ 05:28), Max: 36.9 (11-13-21 @ 12:12)  HR: 92 (11-14-21 @ 05:28) (60 - 96)  BP: 127/93 (11-14-21 @ 05:28) (125/75 - 131/82)  RR: 18 (11-14-21 @ 05:28) (18 - 18)  SpO2: 99% (11-14-21 @ 05:28) (97% - 99%)  Wt(kg): --    PHYSICAL EXAM:  NAD, Alert  Back: Dressing C/D/I; sensation grossly intact to light touch; (+) Distal Pulses; No Calf tenderness B/L, PAS       [ ] Lower extremeity                  PF          DF         EHL       FHL                                                                                            R        5/5        5/5        5/5       5/5                                                        L         5/5        5/5        5/5       5/5      LABS:                        9.1    11.37 )-----------( 323      ( 12 Nov 2021 10:13 )             28.3     11-12    137  |  99  |  6<L>  ----------------------------<  100<H>  4.2   |  25  |  0.46<L>    Ca    8.4      12 Nov 2021 10:13

## 2021-11-15 ENCOUNTER — TRANSCRIPTION ENCOUNTER (OUTPATIENT)
Age: 66
End: 2021-11-15

## 2021-11-15 VITALS
HEART RATE: 78 BPM | RESPIRATION RATE: 18 BRPM | DIASTOLIC BLOOD PRESSURE: 70 MMHG | OXYGEN SATURATION: 95 % | TEMPERATURE: 98 F | SYSTOLIC BLOOD PRESSURE: 132 MMHG

## 2021-11-15 DIAGNOSIS — K59.00 CONSTIPATION, UNSPECIFIED: ICD-10-CM

## 2021-11-15 PROCEDURE — 86923 COMPATIBILITY TEST ELECTRIC: CPT

## 2021-11-15 PROCEDURE — 85027 COMPLETE CBC AUTOMATED: CPT

## 2021-11-15 PROCEDURE — 97164 PT RE-EVAL EST PLAN CARE: CPT

## 2021-11-15 PROCEDURE — C1713: CPT

## 2021-11-15 PROCEDURE — 97535 SELF CARE MNGMENT TRAINING: CPT

## 2021-11-15 PROCEDURE — 97116 GAIT TRAINING THERAPY: CPT

## 2021-11-15 PROCEDURE — U0003: CPT

## 2021-11-15 PROCEDURE — 97165 OT EVAL LOW COMPLEX 30 MIN: CPT

## 2021-11-15 PROCEDURE — 85610 PROTHROMBIN TIME: CPT

## 2021-11-15 PROCEDURE — 72128 CT CHEST SPINE W/O DYE: CPT

## 2021-11-15 PROCEDURE — 36415 COLL VENOUS BLD VENIPUNCTURE: CPT

## 2021-11-15 PROCEDURE — 86769 SARS-COV-2 COVID-19 ANTIBODY: CPT

## 2021-11-15 PROCEDURE — 85730 THROMBOPLASTIN TIME PARTIAL: CPT

## 2021-11-15 PROCEDURE — 76000 FLUOROSCOPY <1 HR PHYS/QHP: CPT

## 2021-11-15 PROCEDURE — 86900 BLOOD TYPING SEROLOGIC ABO: CPT

## 2021-11-15 PROCEDURE — 97162 PT EVAL MOD COMPLEX 30 MIN: CPT

## 2021-11-15 PROCEDURE — C1889: CPT

## 2021-11-15 PROCEDURE — C1769: CPT

## 2021-11-15 PROCEDURE — P9016: CPT

## 2021-11-15 PROCEDURE — 72131 CT LUMBAR SPINE W/O DYE: CPT

## 2021-11-15 PROCEDURE — 36430 TRANSFUSION BLD/BLD COMPNT: CPT

## 2021-11-15 PROCEDURE — U0005: CPT

## 2021-11-15 PROCEDURE — 97110 THERAPEUTIC EXERCISES: CPT

## 2021-11-15 PROCEDURE — 86901 BLOOD TYPING SEROLOGIC RH(D): CPT

## 2021-11-15 PROCEDURE — 86850 RBC ANTIBODY SCREEN: CPT

## 2021-11-15 PROCEDURE — 80048 BASIC METABOLIC PNL TOTAL CA: CPT

## 2021-11-15 PROCEDURE — 97168 OT RE-EVAL EST PLAN CARE: CPT

## 2021-11-15 PROCEDURE — 74018 RADEX ABDOMEN 1 VIEW: CPT

## 2021-11-15 PROCEDURE — 74018 RADEX ABDOMEN 1 VIEW: CPT | Mod: 26

## 2021-11-15 RX ORDER — SENNA PLUS 8.6 MG/1
2 TABLET ORAL
Qty: 0 | Refills: 0 | DISCHARGE
Start: 2021-11-15

## 2021-11-15 RX ORDER — LACTULOSE 10 G/15ML
10 SOLUTION ORAL
Refills: 0 | Status: COMPLETED | OUTPATIENT
Start: 2021-11-15 | End: 2021-11-15

## 2021-11-15 RX ORDER — POLYETHYLENE GLYCOL 3350 17 G/17G
17 POWDER, FOR SOLUTION ORAL
Qty: 0 | Refills: 0 | DISCHARGE
Start: 2021-11-15

## 2021-11-15 RX ORDER — HEPARIN SODIUM 5000 [USP'U]/ML
5000 INJECTION INTRAVENOUS; SUBCUTANEOUS
Qty: 0 | Refills: 0 | DISCHARGE
Start: 2021-11-15

## 2021-11-15 RX ADMIN — LACTULOSE 10 GRAM(S): 10 SOLUTION ORAL at 14:22

## 2021-11-15 RX ADMIN — LACTULOSE 10 GRAM(S): 10 SOLUTION ORAL at 11:01

## 2021-11-15 RX ADMIN — POLYETHYLENE GLYCOL 3350 17 GRAM(S): 17 POWDER, FOR SOLUTION ORAL at 11:01

## 2021-11-15 RX ADMIN — HEPARIN SODIUM 5000 UNIT(S): 5000 INJECTION INTRAVENOUS; SUBCUTANEOUS at 05:35

## 2021-11-15 RX ADMIN — Medication 500 MILLIGRAM(S): at 05:35

## 2021-11-15 RX ADMIN — Medication 200 MILLIGRAM(S): at 05:35

## 2021-11-15 RX ADMIN — Medication 500 MILLIGRAM(S): at 00:17

## 2021-11-15 RX ADMIN — Medication 500 MILLIGRAM(S): at 06:05

## 2021-11-15 RX ADMIN — Medication 500 MILLIGRAM(S): at 11:01

## 2021-11-15 RX ADMIN — Medication 500 MILLIGRAM(S): at 12:25

## 2021-11-15 RX ADMIN — Medication 10 MILLIGRAM(S): at 00:15

## 2021-11-15 RX ADMIN — HEPARIN SODIUM 5000 UNIT(S): 5000 INJECTION INTRAVENOUS; SUBCUTANEOUS at 14:22

## 2021-11-15 RX ADMIN — ONDANSETRON 4 MILLIGRAM(S): 8 TABLET, FILM COATED ORAL at 05:36

## 2021-11-15 RX ADMIN — Medication 3 MILLIGRAM(S): at 00:46

## 2021-11-15 RX ADMIN — ONDANSETRON 4 MILLIGRAM(S): 8 TABLET, FILM COATED ORAL at 16:24

## 2021-11-15 RX ADMIN — DIVALPROEX SODIUM 250 MILLIGRAM(S): 500 TABLET, DELAYED RELEASE ORAL at 05:36

## 2021-11-15 RX ADMIN — Medication 500 MILLIGRAM(S): at 00:47

## 2021-11-15 NOTE — PROGRESS NOTE ADULT - PROBLEM SELECTOR PLAN 1
Patient found to have an unstable 3 column injury to the spine injury associated with VCF at T12.  Seen in a TLSO brace  awaiting further info from ortho to see if Patient is a surgical candidate  continue pain meds and physical therapy as tolerated
Patient found to have an unstable 3 column injury to the spine injury associated with VCF at T12.  continue use of TLSO brace  tolerated surgery well   Patient continue to work with physical therapy  will eventually need DC to rehab
Patient found to have an unstable 3 column injury to the spine injury associated with VCF at T12.  continue use of TLSO brace  no surgery at this time   continue pain meds and physical therapy as tolerated  Plan for DC to rehab
Patient found to have an unstable 3 column injury to the spine injury associated with VCF at T12.  continue use of TLSO brace  ortho has decided to go forward with surgery 11/9  No contraindication to scheduled procedure  Has been working with physical therapy
Patient found to have an unstable 3 column injury to the spine injury associated with VCF at T12.  continue use of TLSO brace  ortho has decided to go forward with surgery next week  No contraindication to scheduled procedure
NPO/IVF  OR today for Fusion  IS  DVT PPx-venodynes  Pain Control  Continue Current Tx.    Abhijit Rodrigues PA-C  Team Pager: #[phone]
PT/OT-WBAT with brace  IS  DVT PPx-venodynes  Pain Control  Continue Current Tx.  Plan for OR next week.    Abhijit Rodrigues PA-C  Team Pager: #[phone]
Patient followed by ortho spine  possible kyphoplasty  awaiting TLSO brace  physical therapy as tolerated  DVT and GI prophylaxis
Patient found to have an unstable 3 column injury to the spine injury associated with VCF at T12.  continue use of TLSO brace  ortho has decided to go forward with surgery 11/9  No contraindication to scheduled procedure  Has been working with physical therapy
-    Pain control  -    DVT ppx: Heparin      -    Physical Therapy  -    Weight bearing status: WBAT with TLSO only  -    Possible OR next week; will discuss results of CT scan with Dr. Prater  -    Dispo: RUSTY Venegas PA-C  Team Pager #[phone]
Patient found to have an unstable 3 column injury to the spine injury associated with VCF at T12.  continue use of TLSO brace  ortho has decided to go forward with surgery 11/9  No contraindication to scheduled procedure  Patient has been medically stable
Patient found to have an unstable 3 column injury to the spine injury associated with VCF at T12.  continue use of TLSO brace  tolerated surgery well   Patient continue to work with physical therapy  will try an enema for constipation  continue suppository as needed
PT/OT-WBAT, brace for support with ambulation  IS  DVT PPx  Pain Control  Continue Current Tx.  Patient to have discussion with Dr. Prater re: role of surgery    Abhijit Rodrigues PA-C  Team Pager: #[phone]
Patient found to have an unstable 3 column injury to the spine injury associated with VCF at T12.  continue use of TLSO brace  no surgery at this time then DC planning to rehab  continue pain meds and physical therapy as tolerated
Patient found to have an unstable 3 column injury to the spine injury associated with VCF at T12.  continue use of TLSO brace  ortho has decided to go forward with surgery 11/9  No contraindication to scheduled procedure  Has been working with physical therapy
Patient found to have an unstable 3 column injury to the spine injury associated with VCF at T12.  continue use ofTLSO brace  awaiting further info from ortho to see if Patient is a surgical candidate  continue pain meds and physical therapy as tolerated
Patient found to have an unstable 3 column injury to the spine injury associated with VCF at T12.  continue use of TLSO brace  scheduled for surgical intervention  No contraindication to scheduled procedure  Patient has been medically stable
Patient found to have an unstable 3 column injury to the spine injury associated with VCF at T12.  continue use of TLSO brace  tolerated surgery well   Patient continue to work with physical therapy  will try an enema for constipation  continue suppository as needed
Patient found to have an unstable 3 column injury to the spine injury associated with VCF at T12.  continue use of TLSO brace  awaiting further info from ortho to see if Patient is a surgical candidate  If no surgery at this time then DC planning to rehab  continue pain meds and physical therapy as tolerated
Patient found to have an unstable 3 column injury to the spine injury associated with VCF at T12.  continue use of TLSO brace  tolerated surgery well   Patient continue to work with physical therapy  will try an enema for constipation  continue laxatives
Patient found to have an unstable 3 column injury to the spine injury associated with VCF at T12.  continue use of TLSO brace  tolerated surgery well   physical therapy as tolerated
Patient found to have an unstable 3 column injury to the spine injury associated with VCF at T12.  continue use of TLSO brace  ortho has decided to go forward with surgery next week  No contraindication to scheduled procedure  Has been working with physical therapy

## 2021-11-15 NOTE — PROVIDER CONTACT NOTE (OTHER) - SITUATION
93/55
pt c/o abdominal pain.
pt had 1 episode of vomiting.
pt c/o abdominal pain, has not had a BM since 11/7

## 2021-11-15 NOTE — PROGRESS NOTE ADULT - PROBLEM SELECTOR PLAN 3
continue current seizure meds  seizure precautions  Neuro follow up as needed  No seizure activity to date
continue current seizure meds  seizure precautions  Neuro follow up as needed
continue antiseizure regime  seizure precautions
continue current seizure meds  seizure precautions  Neuro follow up as needed  No seizure activity to date
continue current seizure meds  seizure precautions  Neuro follow up as needed
continue current seizure meds  seizure precautions  Neuro follow up as needed  No seizure activity to date
continue current seizure meds  seizure precautions  Neuro follow up as needed  No seizure activity to date
continue current seizure meds  seizure precautions  Neuro follow up as needed
continue current seizure meds  seizure precautions  Neuro follow up as needed  No seizure activity to date
continue current seizure meds  seizure precautions  Neuro follow up as needed  No seizure activity to date

## 2021-11-15 NOTE — PROGRESS NOTE ADULT - PROBLEM SELECTOR PLAN 2
pain meds as needed  follow for oversedation  GI regime to prevent constipation  pain has been well controlled
Pain meds as needed  follow for oversedation  GI regime to prevent constipation
pain meds as needed  follow for oversedation  GI regime to prevent constipation
pain meds as needed  Patient is currently on a PCA for pain  follow for oversedation  GI regime to prevent constipation  pain has been well controlled
pain meds as needed  follow for oversedation  GI regime to prevent constipation  pain has been well controlled
pain meds as needed  Patient is currently on a PCA for pain  follow for oversedation  GI regime to prevent constipation  pain has been well controlled
pain meds as needed  Patient is currently on a PCA for pain  follow for oversedation  GI regime to prevent constipation  pain has been well controlled
pain meds as needed  follow for oversedation  GI regime to prevent constipation  pain has been well controlled

## 2021-11-15 NOTE — PROGRESS NOTE ADULT - PROVIDER SPECIALTY LIST ADULT
Anesthesia
Orthopedics
Anesthesia
Orthopedics
Internal Medicine
Orthopedics
Internal Medicine
Orthopedics
Internal Medicine

## 2021-11-15 NOTE — PROGRESS NOTE ADULT - SUBJECTIVE AND OBJECTIVE BOX
66F hx breast CA in remission, benign brain CA S/p excision, skin CA, seizure presented to [facility] ED with back pain, transferred to [facility] per family request. Patient is a community ambulator with cane and lives with her son and daughter in law. Patient notes that about three weeks ago, she was walking in her home when she tripped over some power cords and landed on her back. She noted immediate back pain but was able to continue walking and performing ADLs. She went to seek medical attention a few days after and was prescribed a muscle relaxant which helped with her pain. However, about a week ago, she tried to stop using her muscle relaxer and began to start experiencing pain in the lower back that would radiate around to the abdomen. She made an appointment with Dr. Link of [facility] for next Tuesday, but the pain became so bad that she had to come to the ED. Patient denies leg pain, saddle anesthesia, bowel or bladder incontinence, numbness, tingling, weakness, or any other orthopaedic complaint. Patient seen now lying bed resting comfortably. On MRI Patient found to have unstable 3 column injury to the spine injury associated with a vertebral compression fracture at T12. Patient has been wearing a TLSO brace and states pain has been controlled. Has started working with physical therapy. Patient states pain has been well controlled. Has been working with physical therapy. Patient is s/p T10-L3 posterior fusion Tolerated the procedure well. Pain has been managed. Pt with complaint of constipation. Some improvement after  a suppository . Patient with continue complaint of abdominal pain with continue constipation.       MEDICATIONS  (STANDING):  acetaminophen     Tablet .. 500 milliGRAM(s) Oral every 6 hours  carBAMazepine 200 milliGRAM(s) Oral two times a day  diVALproex  milliGRAM(s) Oral two times a day  heparin   Injectable 5000 Unit(s) SubCutaneous every 8 hours  influenza  Vaccine (HIGH DOSE) 0.7 milliLiter(s) IntraMuscular once  lactulose Syrup 10 Gram(s) Oral every 3 hours  polyethylene glycol 3350 17 Gram(s) Oral daily  senna 2 Tablet(s) Oral at bedtime  sodium chloride 0.9%. 1000 milliLiter(s) (75 mL/Hr) IV Continuous <Continuous>    MEDICATIONS  (PRN):  bisacodyl Suppository 10 milliGRAM(s) Rectal daily PRN If no bowel movement  magnesium hydroxide Suspension 30 milliLiter(s) Oral daily PRN Constipation  melatonin 3 milliGRAM(s) Oral at bedtime PRN Insomnia  methocarbamol 750 milliGRAM(s) Oral three times a day PRN Muscle Spasm  ondansetron Injectable 4 milliGRAM(s) IV Push every 6 hours PRN Nausea and/or Vomiting  oxyCODONE    IR 10 milliGRAM(s) Oral every 4 hours PRN Severe Pain (7 - 10)  oxyCODONE    IR 5 milliGRAM(s) Oral every 4 hours PRN Moderate Pain (4 - 6)  traMADol 50 milliGRAM(s) Oral every 6 hours PRN Mild Pain (1 - 3)          VITALS:   T(C): 36.9 (11-15-21 @ 12:06), Max: 36.9 (11-14-21 @ 20:56)  HR: 79 (11-15-21 @ 12:06) (79 - 99)  BP: 129/79 (11-15-21 @ 12:06) (129/79 - 160/90)  RR: 18 (11-15-21 @ 12:06) (18 - 18)  SpO2: 97% (11-15-21 @ 12:06) (96% - 98%)  Wt(kg): --    PHYSICAL EXAM  well-groomed, well-developed  HEAD:  Atraumatic, Normocephalic  EYES: EOMI, PERRLA, conjunctiva and sclera clear  ENMT: No tonsillar erythema, exudates, or enlargement; Moist mucous membranes, Good dentition, No lesions  NECK: decreased ROM  NERVOUS SYSTEM:  Alert & Oriented X3, Good concentration; Motor Strength 5/5 B/L upper and lower extremities; DTRs 2+ intact and symmetric  CHEST/LUNG: Clear to percussion bilaterally; No rales, rhonchi, wheezing, or rubs  HEART: Regular rate and rhythm; No murmurs, rubs, or gallops  ABDOMEN: Soft, Nontender, Nondistended; Bowel sounds present  EXTREMITIES:  2+ Peripheral Pulses, No clubbing, cyanosis, or edema  LYMPH: No lymphadenopathy noted  SKIN: No rashes or lesions    LABS:                      CAPILLARY BLOOD GLUCOSE          RADIOLOGY & ADDITIONAL TESTS:

## 2021-11-15 NOTE — PROGRESS NOTE ADULT - TIME BILLING
Discussed treatment plan with patient at bedside.
Please note that over 70 minutes of time was spent in care of this patient including previsit preparation, in person visit, post visit documentation, review of imaging and discussion with colleagues/family.
Discussed treatment plan with patient at bedside.
Discussed treatment plan with patient at bedside.
Discussed treatment plan with patient at bedside.  awaiting OR next week
Discussed treatment plan with patient and family at bedside.
Discussed treatment plan with patient at bedside.
Discussed treatment plan with patient at bedside.
Discussed treatment plan with patient at bedside.  awaiting OR next week
Discussed treatment plan with patient and family at bedside.
Discussed treatment plan with patient at bedside.
Discussed treatment plan with patient at bedside.
Extensive discussion was had as above with the patient requiring over 35 minutes of direct patient care, discussion with colleagues, post visit documentation.
Discussed treatment plan with patient at bedside.
Discussed treatment plan with patient at bedside.
Please note that over 35 minutes of time was spent in care of this patient which included pre visit preparation, in person visit, post visit documentation, review of imaging, discussion with colleagues.
Discussed treatment plan with patient at bedside.
Discussed treatment plan with patient at bedside.  awaiting OR next week
Discussed treatment plan with patient at bedside.

## 2021-11-15 NOTE — PROGRESS NOTE ADULT - ATTENDING COMMENTS
see above
Agree with above, walking well, neurologically intact. Will ensure that she has appropriate bowel regimen, f/u read on xray
Neurologically intact. Pain is stable and controlled. Right arm swelling from IV improved. Will slowly mobilize over the coming days.
Agree with above. Neurologic exam stable. Back pain controlled. Walking well.
Agree with above, doing well, neurologically intact, pain better controlled this AM, walking with PT. Continue with TLSO
Agree with above, neurologically intact, resting this morning. Will continue to mobilize with PT, monitor AM labs
I had a lengthy discussion this evening with the patient and the patient's family. She has an unstable T12 fracture involving all three columns. We will proceed with a T9-L3 posterior spinal fusion +/- decompression. I discussed risks and benefits of the procedure. These risks include pain, need for further surgery, non resolution of symptoms, injury to surrounding nerves/arteries/veins, paralysis, nerve root injury, difficulty with positioning given the patient's AS diagnosis, bleeding, csf leak, dural tear, instrumentation failure, nonunion, fracture, adjacent segment degeneration, as well as other risks. The patient and the patient's family agreed to proceed with the planned procedure. Proper informed consent was obtained. All questions were answered including the need for brace post-op, limitation of recovery, limitations in terms of ambulation etc.
The patient has a T11-T12 fracture in the setting of ankylosing spondylitis. She has done well with physical therapy, trial of ambulation. She is also now 4 weeks out from the initial injury. Her pain is currently controlled.     I discussed at length with the family and the patient her current clinical condition. She has an unstable three column injury in the setting of AS. Surgery is an option to definitively fixate this injury and would protect this segment of her spine from further instability with another fall/injury. Surgery does come with known risks such as pain, need for further surgery, infection, non resolution of symptoms, paralysis, nerve root injury, instrumentation failure, nonunion, dvt/pe, and other risks. Alternatively, the patient has done well with non-operative care and has been able to ambulate. Non-operative care also includes risks of catastrophic SCI with a fall given the nature of her fracture. She would also have to wear her brace for a prolonged amount of time.     Risks and benefits of both operative and non-operative care were discussed at length. The patient and the patient's family will take some time to think through their options. They will reach out to me in the coming days regarding their decision.
Agree with above. I discussed with the patient and her patient's family the risks and benefits of operative and nonoperative interventions. Specific risks associated with operative intervention including pain, need for reoperation, instrumentation failure, paralysis, nerve root injury, non resolution of symptoms, nonunion, dvt/pt, dural tear, death, bleeding. The patient did agree to proceed with the operative intervention.     We will work on obtaining medical clearance over the coming days.
Neurologic exam stable. Pain stable. As noted in yesterday's progress note the patient and family have decided to move forward with surgery. Plan for medical clearance over the coming days. Continue to mobilize as able with PT/brace.

## 2021-11-15 NOTE — PROGRESS NOTE ADULT - SUBJECTIVE AND OBJECTIVE BOX
Orthopaedic Surgery Progress Note    Subjective:   Patient seen and examined. No acute events overnight.     Objective:  T(C): 36.6 (11-15-21 @ 05:30), Max: 36.9 (11-14-21 @ 20:56)  HR: 93 (11-15-21 @ 05:30) (79 - 99)  BP: 160/90 (11-15-21 @ 05:30) (128/85 - 160/90)  RR: 18 (11-15-21 @ 05:30) (18 - 18)  SpO2: 98% (11-15-21 @ 05:30) (96% - 99%)  Wt(kg): --    11-13 @ 07:01  -  11-14 @ 07:00  --------------------------------------------------------  IN: 1200 mL / OUT: 1500 mL / NET: -300 mL    11-14 @ 07:01  -  11-15 @ 06:46  --------------------------------------------------------  IN: 740 mL / OUT: 225 mL / NET: 515 mL        PE  Gen: NAD  Back:   dressing C/D/I, mild appropriate abdirahman-incisional ttp  Neuro:  RLE IP 5/5 HS 5/5 Q 5/5 GS 5/5 TA 5/5 EHL 5/5   SILT L2-S1  LLE IP 5/5 HS 5/5 Q 5/5 GS 5/5 TA 5/5 EHL 5/5  SILT L2-S1  WWP BLE         66y Female s/p T10-L3 PSF  - FU abdominal xray official read  - Pain control  - FU labs  - WBAT  - PT/OT/OOB  - I/S  - SCDs  - Dispo planning

## 2021-11-15 NOTE — DISCHARGE NOTE NURSING/CASE MANAGEMENT/SOCIAL WORK - PATIENT PORTAL LINK FT
You can access the FollowMyHealth Patient Portal offered by [facility] by registering at the following website: [email or web address]. By joining [employer]’s FollowMyHealth portal, you will also be able to view your health information using other applications (apps) compatible with our system.

## 2021-11-15 NOTE — PROGRESS NOTE ADULT - PROBLEM SELECTOR PROBLEM 1
Vertebral compression fracture

## 2021-11-15 NOTE — PROGRESS NOTE ADULT - ASSESSMENT
Impression: Stable       Plan:   Continue present treatment                 TOV today after ambulation                 Out of bed, ambulate, as tolerated with brace                 Physical therapy follow up                 Continue to monitor    Wil Tucker PA-C  Orthopaedic Surgery  Team pager [phone]  [phone]   
65 y/o FM with T12 VCF w/ T11-T12 3 column injury, for surgical intervention 11/9/2021  Maria Teresa Vance PA-C  Orthopaedic Surgery  Team pager [phone]  [facility] [phone]  [phone]  
65 yo woman presents with complaint of back pain after a fall three weeks ago. found to have an unstable 3 column injury to the spine injury associated with VCF at T12.
67 y/o FM s/p T10-L3 PSF POD#3, physical therapy RUSTY once HMVs removed  Maria Teresa Vance PA-C  Orthopaedic Surgery  Team pager [phone]  [facility] [phone]  [phone]    
67 yo woman presents with complaint of back pain after a fall three weeks ago. found to have an unstable 3 column injury to the spine injury associated with VCF at T12.
A/P :  66y Female s/p T12 Vertebral compression Fx w/ T11-T12 3 Column Injury. VSS, NAD  -    Pain control  -    DVT ppx: SCDs       -    Physical Therapy: Ambulate as tolerate with TLSO brace  -    Weight bearing status: WBAT with TLSO brace  -    Possible planning for the Spine procedure in the OR on 11/9/21 with Dr. Prater  -    F/U with Dr. Prater regarding plan for OR     Woody Taylor PA-C  Orthopedic Surgery Team  Team Pager: [phone]
A/p: 66yFemale s/p T12 VCF w/ T11-T12 3 column injury.  VSS. NAD.    
A/p: 66yFemale s/p T12 VCF w/ T11-T12 3 column injury.  VSS. NAD.    PT/OT-WBAT with brace  DVT PPx with SCDs  Pain Control  Plan for OR next week.
Dx: T12 VCF with T11-12 3 column injury    Plan    Non-operative  OOB/PT/WBAT with KAMERON  D/C planning       Hilary Pulido PA-C   Beeper    [phone]  
     Impression: Stable       Plan:   Continue present treatment, CT ordered T spine after OOB                 Out of bed, ambulate, in brace as tolerated                  Physical therapy follow up                  Continue to monitor    Wil Tucker PA-C  Orthopaedic Surgery  Team pager [phone]  [phone] 
     Impression: Stable       Plan:   Continue present treatment, preop for surgery 11/9                 Out of bed, ambulate, as tolerated                  Physical therapy follow up                  Continue to monitor    Wil Tucker PA-C  Orthopaedic Surgery  Team pager [phone]  [phone]                   
 66y old  Female who presents with a chief complaint of T12 VCF, with T11-12 3 column injury  Patient for surgical intervention 11/9/2021  Maria Teresa Vance PA-C  Orthopaedic Surgery  Team pager [phone]  [facility] [phone]  [phone]    
67 yo woman presents with complaint of back pain after a fall three weeks ago. found to have an unstable 3 column injury to the spine injury associated with VCF at T12.
A/p: 66yFemale s/p T12 VCF w/ T11-T12 3 column injury.  VSS. NAD.    
67 yo woman presents with complaint of back pain after a fall three weeks ago. found to have an unstable 3 column injury to the spine injury associated with VCF at T12.
A/P : Patient is a 66y Female with unstable 3 column injury to the spine injury associated with VCF at T12.  
A/p: 66yFemale s/p T12 VCF with T11-T12 3 column injury.  VSS. NAD.    
67 yo woman presents with complaint of back pain after a fall three weeks ago. found to have an unstable 3 column injury to the spine injury associated with VCF at T12.Pt is s/p  T10-L3 posterior fusion
S/P T 10-L3 PSF      Plan    Continue bowel regimen  OOB/PT/WBAT with brace  D/C planning     Hilary Pulido PA-C   Beeper    [phone]  
S/P T10-L3 PSF    Plan    Bowel regimen  OOB/PT/WBAT with brace  D/C planning       Hilary Pulido PA-C   Beeper    [phone]  
65 yo woman presents with complaint of back pain after a fall three weeks ago. found to have an unstable 3 column injury to the spine injury associated with VCF at T12.
67 yo woman presents with complaint of back pain after a fall three weeks ago. found to have an unstable 3 column injury to the spine injury associated with VCF at T12.Pt is s/p  T10-L3 posterior fusion
67 yo woman presents with complaint of back pain after a fall three weeks ago. found to have an unstable 3 column injury to the spine injury associated with VCF at T12.Pt is s/p  T10-L3 posterior fusion
65 yo woman presents with complaint of back pain after a fall three weeks ago. found to have an unstable 3 column injury to the spine injury associated with VCF at T12.
67 yo woman presents with complaint of back pain after a fall three weeks ago. found to have an unstable 3 column injury to the spine injury associated with VCF at T12.Pt is s/p  T10-L3 posterior fusion
67 yo woman presents with complaint of back pain after a fall three weeks ago. found to have an unstable 3 column injury to the spine injury associated with VCF at T12.Pt is s/p  T10-L3 posterior fusion
65 yo woman presents with complaint of back pain after a fall three weeks ago. found to have a vertebral compression fracture
67 yo woman presents with complaint of back pain after a fall three weeks ago. found to have a vertebral compression fracture

## 2021-11-15 NOTE — PROVIDER CONTACT NOTE (OTHER) - ACTION/TREATMENT ORDERED:
MD Doran made aware. As per MD Doran give PRN suppository. No further interventions at this time, will cont to monitor.
MD Doran made aware. As per MD Doran Magnesium Citrate to be ordered. No further interventions at this time. Will cont to monitor.
MD Jc made aware, abdominal x-ray to be ordered. No further interventions made at this time. will cont to monitor.
as per PA, IVF to be ordered and encourage PO fluids. Recheck vitals as per routine hrs.

## 2021-11-15 NOTE — PROGRESS NOTE ADULT - PROBLEM SELECTOR PROBLEM 3
Seizures

## 2021-11-15 NOTE — PROVIDER CONTACT NOTE (OTHER) - ASSESSMENT
Pt remains A&Ox4, VSS. Pt in bed laying on her side c/o abdominal pain. Hyperactive bowels sounds auscultated, Pt passing flatus, abdomen non distended, non tender to touch.

## 2021-11-15 NOTE — PROVIDER CONTACT NOTE (OTHER) - REASON
Pt c/o abdominal pain.
pt c/o abdominal pain, has not had a BM since 11/7
93/55
pt had 1 episode of vomiting

## 2021-11-26 PROBLEM — Z00.00 ENCOUNTER FOR PREVENTIVE HEALTH EXAMINATION: Status: ACTIVE | Noted: 2021-11-26

## 2021-12-06 ENCOUNTER — APPOINTMENT (OUTPATIENT)
Dept: ORTHOPEDIC SURGERY | Facility: CLINIC | Age: 66
End: 2021-12-06
Payer: MEDICARE

## 2021-12-06 VITALS — WEIGHT: 112 LBS | HEIGHT: 62 IN | BODY MASS INDEX: 20.61 KG/M2

## 2021-12-06 PROCEDURE — 99024 POSTOP FOLLOW-UP VISIT: CPT

## 2021-12-06 PROCEDURE — 72082 X-RAY EXAM ENTIRE SPI 2/3 VW: CPT

## 2021-12-06 NOTE — HISTORY OF PRESENT ILLNESS
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  She was eventually treated with a T10-L3 posterior spinal fusion.  She did well after the surgery.  She is currently here for follow-up.  She denies any radicular pain down her arms or her legs.  She is walking very well with a walker.  Overall she is very pleased with her hospital course and postoperative treatment.

## 2021-12-06 NOTE — PHYSICAL EXAM
[de-identified] : Lumbar Physical Exam\par \par Gait -walking very well with a walker\par \par Forward pitched, positive sagittal balance\par \par \par Reflexes\par Patellar - normal\par Gastroc - normal\par Clonus - No\par \par \par Pulses - 2+ dp/pt\par \par Range of motion -reduced\par \par Sensation \par Sensation intact to light touch in L1, L2, L3, L4, L5 and S1 dermatomes bilaterally\par \par Motor\par 	IP	Quad	HS	TA	Gastroc	EHL\par Right	5/5	5/5	5/5	5/5	5/5	5/5\par Left	5/5	5/5	5/5	5/5	5/5	5/5\par \par Incision is clean dry and intact [de-identified] : Scoliosis radiographs\par Hardware in appropriate position\par No acute complication\par

## 2021-12-06 NOTE — ASSESSMENT
[FreeTextEntry1] : This is a 66-year-old female that is now 4 weeks out from T10-L3 posterior spinal fusion for a 3 column T11-T12 fracture.  Overall she has done very well since the surgery.  At this point she should continue to ambulate with a walker and her brace.  She can take Tylenol as needed when and if she has pain.  I will have her follow-up in 4 weeks for repeat clinical evaluation.  I encouraged her to follow-up sooner if she has any new or worsening symptoms.

## 2021-12-21 ENCOUNTER — APPOINTMENT (OUTPATIENT)
Dept: GERIATRICS | Facility: CLINIC | Age: 66
End: 2021-12-21
Payer: MEDICARE

## 2021-12-21 VITALS
WEIGHT: 112.25 LBS | HEART RATE: 91 BPM | DIASTOLIC BLOOD PRESSURE: 62 MMHG | HEIGHT: 62 IN | TEMPERATURE: 97.5 F | OXYGEN SATURATION: 99 % | BODY MASS INDEX: 20.66 KG/M2 | SYSTOLIC BLOOD PRESSURE: 110 MMHG | RESPIRATION RATE: 6 BRPM

## 2021-12-21 DIAGNOSIS — Z85.3 PERSONAL HISTORY OF MALIGNANT NEOPLASM OF BREAST: ICD-10-CM

## 2021-12-21 DIAGNOSIS — Z96.642 PRESENCE OF LEFT ARTIFICIAL HIP JOINT: ICD-10-CM

## 2021-12-21 DIAGNOSIS — R56.9 UNSPECIFIED CONVULSIONS: ICD-10-CM

## 2021-12-21 DIAGNOSIS — Z87.898 PERSONAL HISTORY OF OTHER SPECIFIED CONDITIONS: ICD-10-CM

## 2021-12-21 DIAGNOSIS — Z76.89 PERSONS ENCOUNTERING HEALTH SERVICES IN OTHER SPECIFIED CIRCUMSTANCES: ICD-10-CM

## 2021-12-21 DIAGNOSIS — Z85.828 PERSONAL HISTORY OF OTHER MALIGNANT NEOPLASM OF SKIN: ICD-10-CM

## 2021-12-21 DIAGNOSIS — D64.9 ANEMIA, UNSPECIFIED: ICD-10-CM

## 2021-12-21 LAB
BASOPHILS # BLD AUTO: 0.05 K/UL
BASOPHILS NFR BLD AUTO: 0.5 %
EOSINOPHIL # BLD AUTO: 0.1 K/UL
EOSINOPHIL NFR BLD AUTO: 0.9 %
HCT VFR BLD CALC: 37.4 %
HGB BLD-MCNC: 11.6 G/DL
IMM GRANULOCYTES NFR BLD AUTO: 0.4 %
LYMPHOCYTES # BLD AUTO: 1.93 K/UL
LYMPHOCYTES NFR BLD AUTO: 17.4 %
MAN DIFF?: NORMAL
MCHC RBC-ENTMCNC: 29.7 PG
MCHC RBC-ENTMCNC: 31 GM/DL
MCV RBC AUTO: 95.9 FL
MONOCYTES # BLD AUTO: 0.89 K/UL
MONOCYTES NFR BLD AUTO: 8 %
NEUTROPHILS # BLD AUTO: 8.06 K/UL
NEUTROPHILS NFR BLD AUTO: 72.8 %
PLATELET # BLD AUTO: 537 K/UL
RBC # BLD: 3.9 M/UL
RBC # FLD: 13.8 %
WBC # FLD AUTO: 11.07 K/UL

## 2021-12-21 PROCEDURE — 99204 OFFICE O/P NEW MOD 45 MIN: CPT | Mod: GC

## 2021-12-21 PROCEDURE — 99072 ADDL SUPL MATRL&STAF TM PHE: CPT

## 2021-12-21 RX ORDER — DIVALPROEX SODIUM 250 MG/1
250 TABLET, DELAYED RELEASE ORAL
Qty: 60 | Refills: 0 | Status: ACTIVE | COMMUNITY
Start: 2021-12-21

## 2021-12-21 RX ORDER — CARBAMAZEPINE 200 MG/1
200 TABLET ORAL TWICE DAILY
Qty: 60 | Refills: 0 | Status: ACTIVE | COMMUNITY
Start: 2021-12-21

## 2021-12-21 NOTE — REVIEW OF SYSTEMS
[Arthralgias] : arthralgias [Fever] : no fever [Chills] : no chills [Feeling Poorly] : not feeling poorly [Feeling Tired] : not feeling tired [Eye Pain] : no eye pain [Red Eyes] : eyes not red [Eyesight Problems] : no eyesight problems [Earache] : no earache [Nosebleeds] : no nosebleeds [Heart Rate Is Slow] : the heart rate was not slow [Heart Rate Is Fast] : the heart rate was not fast [Chest Pain] : no chest pain [Palpitations] : no palpitations [Skin Lesions] : no skin lesions [Skin Wound] : no skin wound [Confused] : no confusion [Convulsions] : no convulsions [Dizziness] : no dizziness [FreeTextEntry9] : Back pain

## 2021-12-21 NOTE — HISTORY OF PRESENT ILLNESS
[Two or more falls in past year] : Patient reported two or more falls in the past year [Independent] : managing finances [Patient reported hearing was abnormal] : Patient reported hearing was abnormal [Patient reported vision is abnormal] : Patient reported vision is abnormal [Patient reported skin cancer screening was abnormal] : Patient reported skin cancer screening was abnormal [Patient reported breast cancer screening was abnormal] : Patient reported breast cancer screening was abnormal [] : Patient is continent. [Walker] : walker [FreeTextEntry1] : 66-year-old female with a history Skin Cancer, Breast Ca, 2 hip replacements, Seiures (last episode in 1993) and  T11-T12 column fracture after a fall s/p T10-L3 posterior spinal fusion presents to the geriatric clinic for establish care.\par Surgery was  in11/2021, went to [facility] for rehabilitation for 3 weeks. \par Since discharge patient states feels well.  Receiving physical therapy twice a week at home. Affirms pain has been under control and takes Ibuprofen PRN. \par Denies cough, fever, problems with voiding or stooling. \par Labs from previous hospitalization (11/2021) showed mid anemia Hbg 10.6 and K 5.7. \par \par Preventive medicine:\par -Covid vaccine #2. Pending booster.  \par -Flu vaccine Refused \par -Colonoscopy, never done. Agreed to have it after off brace. \par -Mamogram 04/2021 as per patient normal, follows with oncologist yearly (Hx Breast Ca 2008)\par -GYN as per patient was normal over 2 years ago \par \par Social: Lives with her daughter and son inlaw, in a house (only 1 step at the entrance). Denies ETOH use, smoking or drugs in the past. Also has 4 dogs, 4 cats and 2 fish tanks.  8 years ago. Was  for 31 years. No longer drive due to visual problems. \par After surgery her daughter is helping her in the morning with dressing, bathing and to place her thoracic brace in place. \par \par Physicians\par - Neurologist Dr. Blancas [phone]\par - ENT   [phone]\par -Previous PCP Dr. Moseley [phone]\par -Oncologist Dr. Louise ([place]) [facility] \par -Ortho Dr. Prater ([facility] [place]) [TextBox_31] : hearing device [FreeTextEntry2] : hx skin cancer  1998 [FreeTextEntry4] : hx breast cancer 2008

## 2021-12-21 NOTE — PHYSICAL EXAM
[Alert] : alert [No Acute Distress] : in no acute distress [EOMI] : extraocular movements were intact [PERRL] : pupils were equal in size, round, and reactive to light [Normal Oral Mucosa] : normal oral mucosa [Normal Appearance] : the appearance of the neck was normal [Supple] : the neck was supple [No Respiratory Distress] : no respiratory distress [No Acc Muscle Use] : no accessory muscle use [Auscultation Breath Sounds / Voice Sounds] : lungs were clear to auscultation bilaterally [Heart Rate And Rhythm] : heart rate was normal and rhythm regular [Edema] : edema was not present [Bowel Sounds] : normal bowel sounds [Abdomen Tenderness] : non-tender [Abdomen Soft] : soft [No Clubbing, Cyanosis] : no clubbing or cyanosis of the fingernails [Motor Tone] : muscle strength and tone were normal [No Focal Deficits] : no focal deficits [Oriented To Time, Place, And Person] : oriented to person, place, and time [Normal Affect] : the affect was normal [Normal Mood] : the mood was normal [de-identified] : Back brace in place, able to lift arms
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
## 2021-12-27 LAB
ALBUMIN SERPL ELPH-MCNC: 4.3 G/DL
ALP BLD-CCNC: 111 U/L
ALT SERPL-CCNC: 8 U/L
ANION GAP SERPL CALC-SCNC: 12 MMOL/L
AST SERPL-CCNC: 10 U/L
BILIRUB SERPL-MCNC: 0.3 MG/DL
BUN SERPL-MCNC: 16 MG/DL
CALCIUM SERPL-MCNC: 9.4 MG/DL
CHLORIDE SERPL-SCNC: 103 MMOL/L
CO2 SERPL-SCNC: 27 MMOL/L
CREAT SERPL-MCNC: 0.55 MG/DL
ESTIMATED AVERAGE GLUCOSE: 91 MG/DL
FERRITIN SERPL-MCNC: 53 NG/ML
FOLATE SERPL-MCNC: >20 NG/ML
GLUCOSE SERPL-MCNC: 89 MG/DL
HBA1C MFR BLD HPLC: 4.8 %
IRON SERPL-MCNC: 57 UG/DL
POTASSIUM SERPL-SCNC: 5.3 MMOL/L
PROT SERPL-MCNC: 6.5 G/DL
SODIUM SERPL-SCNC: 143 MMOL/L
TRANSFERRIN SERPL-MCNC: 259 MG/DL
TSH SERPL-ACNC: 2.2 UIU/ML
VIT B12 SERPL-MCNC: 655 PG/ML

## 2021-12-28 ENCOUNTER — NON-APPOINTMENT (OUTPATIENT)
Age: 66
End: 2021-12-28

## 2022-01-06 ENCOUNTER — APPOINTMENT (OUTPATIENT)
Dept: ORTHOPEDIC SURGERY | Facility: CLINIC | Age: 67
End: 2022-01-06
Payer: MEDICARE

## 2022-01-06 VITALS — HEIGHT: 62 IN | WEIGHT: 113 LBS | BODY MASS INDEX: 20.8 KG/M2

## 2022-01-06 PROCEDURE — 99024 POSTOP FOLLOW-UP VISIT: CPT

## 2022-01-06 PROCEDURE — 72082 X-RAY EXAM ENTIRE SPI 2/3 VW: CPT
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
## 2022-01-06 NOTE — PHYSICAL EXAM
[de-identified] : Lumbar Physical Exam\par \par Gait -walking very well with a walker\par \par Forward pitched, positive sagittal balance\par \par \par Reflexes\par Patellar - normal\par Gastroc - normal\par Clonus - No\par \par \par Pulses - 2+ dp/pt\par \par Range of motion -reduced\par \par Sensation \par Sensation intact to light touch in L1, L2, L3, L4, L5 and S1 dermatomes bilaterally\par \par Motor\par 	IP	Quad	HS	TA	Gastroc	EHL\par Right	5/5	5/5	5/5	5/5	5/5	5/5\par Left	5/5	5/5	5/5	5/5	5/5	5/5\par \par Incision is clean dry and intact [de-identified] : Scoliosis radiographs\par Hardware in appropriate position\par No acute complication\par
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
## 2022-02-16 ENCOUNTER — APPOINTMENT (OUTPATIENT)
Dept: ORTHOPEDIC SURGERY | Facility: CLINIC | Age: 67
End: 2022-02-16
Payer: COMMERCIAL

## 2022-02-16 VITALS — WEIGHT: 113 LBS | HEIGHT: 62 IN | BODY MASS INDEX: 20.8 KG/M2

## 2022-02-16 DIAGNOSIS — Z98.1 ARTHRODESIS STATUS: ICD-10-CM

## 2022-02-16 PROCEDURE — 72082 X-RAY EXAM ENTIRE SPI 2/3 VW: CPT

## 2022-02-16 PROCEDURE — 99214 OFFICE O/P EST MOD 30 MIN: CPT

## 2022-02-16 PROCEDURE — 99072 ADDL SUPL MATRL&STAF TM PHE: CPT
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
## 2022-02-16 NOTE — PHYSICAL EXAM
[de-identified] : Lumbar Physical Exam\par \par Gait -walking very well with a walker\par \par Forward pitched, positive sagittal balance\par \par \par Reflexes\par Patellar - normal\par Gastroc - normal\par Clonus - No\par \par \par Pulses - 2+ dp/pt\par \par Range of motion -reduced\par \par Sensation \par Sensation intact to light touch in L1, L2, L3, L4, L5 and S1 dermatomes bilaterally\par \par Motor\par 	IP	Quad	HS	TA	Gastroc	EHL\par Right	5/5	5/5	5/5	5/5	5/5	5/5\par Left	5/5	5/5	5/5	5/5	5/5	5/5\par \par Incision is clean dry and intact [de-identified] : Scoliosis radiographs\par Hardware in appropriate position\par No acute complication\par
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
## 2022-02-22 ENCOUNTER — APPOINTMENT (OUTPATIENT)
Dept: ORTHOPEDIC SURGERY | Facility: HOSPITAL | Age: 67
End: 2022-02-22

## 2022-02-22 ENCOUNTER — APPOINTMENT (OUTPATIENT)
Dept: ORTHOPEDIC SURGERY | Facility: CLINIC | Age: 67
End: 2022-02-22
Payer: MEDICARE

## 2022-02-22 DIAGNOSIS — M54.9 DORSALGIA, UNSPECIFIED: ICD-10-CM

## 2022-02-22 PROCEDURE — 99072 ADDL SUPL MATRL&STAF TM PHE: CPT

## 2022-02-22 PROCEDURE — 99213 OFFICE O/P EST LOW 20 MIN: CPT

## 2022-03-11 ENCOUNTER — APPOINTMENT (OUTPATIENT)
Dept: ORTHOPEDIC SURGERY | Facility: CLINIC | Age: 67
End: 2022-03-11
Payer: COMMERCIAL

## 2022-03-11 VITALS
HEART RATE: 98 BPM | DIASTOLIC BLOOD PRESSURE: 70 MMHG | BODY MASS INDEX: 21.71 KG/M2 | SYSTOLIC BLOOD PRESSURE: 143 MMHG | HEIGHT: 61 IN | WEIGHT: 115 LBS

## 2022-03-11 PROCEDURE — 99214 OFFICE O/P EST MOD 30 MIN: CPT

## 2022-03-11 PROCEDURE — 99072 ADDL SUPL MATRL&STAF TM PHE: CPT
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
## 2022-03-15 PROBLEM — M54.9 ACUTE BACK PAIN, UNSPECIFIED BACK LOCATION, UNSPECIFIED BACK PAIN LATERALITY: Status: ACTIVE | Noted: 2022-03-15
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
## 2022-03-15 NOTE — PHYSICAL EXAM
[de-identified] : Lumbar Physical Exam\par \par Gait - with walker\par \par \par Compensatory mechanism- none\par \par \par Hip Exam - Normal \par \par Straight leg raise - None\par \par Pulses - 2+\par \par Range of motion - normal\par \par Sensation \par L1-L3 - normal\par L4 - normal\par L5 - normal\par S1 - normal\par \par Motor\par 	IP	Quad	HS	TA	Gastroc	EHL	\par Right	5/5	5/5	5/5	5/5	5/5	5/5	\par Left	5/5	5/5	5/5	5/5	5/5	5/5	\par \par Skin clean, dry and intact with no ecchymosis to the cervical, thoracic or lumbar region. She had mild midline and para musculature of the thoracic vertebrae. \par
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
## 2022-03-16 ENCOUNTER — APPOINTMENT (OUTPATIENT)
Dept: ORTHOPEDIC SURGERY | Facility: CLINIC | Age: 67
End: 2022-03-16

## 2022-04-04 ENCOUNTER — NON-APPOINTMENT (OUTPATIENT)
Age: 67
End: 2022-04-04

## 2022-04-04 RX ORDER — TIZANIDINE 2 MG/1
2 TABLET ORAL
Qty: 30 | Refills: 0 | Status: ACTIVE | COMMUNITY
Start: 2022-04-04 | End: 1900-01-01

## 2022-06-15 ENCOUNTER — APPOINTMENT (OUTPATIENT)
Dept: ORTHOPEDIC SURGERY | Facility: CLINIC | Age: 67
End: 2022-06-15
Payer: COMMERCIAL

## 2022-06-15 VITALS
DIASTOLIC BLOOD PRESSURE: 72 MMHG | SYSTOLIC BLOOD PRESSURE: 125 MMHG | HEIGHT: 61 IN | BODY MASS INDEX: 21.71 KG/M2 | WEIGHT: 115 LBS

## 2022-06-15 DIAGNOSIS — S22.080A WEDGE COMPRESSION FRACTURE OF T11-T12 VERTEBRA, INITIAL ENCOUNTER FOR CLOSED FRACTURE: ICD-10-CM

## 2022-06-15 PROCEDURE — 99072 ADDL SUPL MATRL&STAF TM PHE: CPT

## 2022-06-15 PROCEDURE — 99214 OFFICE O/P EST MOD 30 MIN: CPT
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
## 2022-06-15 NOTE — PHYSICAL EXAM
[de-identified] : Lumbar Physical Exam\par \par Gait - with walker, was able to walk approximately 40 feet in the office today\par \par \par Compensatory mechanism- none\par \par \par Hip Exam - Normal \par \par Straight leg raise - None\par \par Pulses - 2+\par \par Range of motion - normal\par \par Sensation \par L1-L3 - normal\par L4 - normal\par L5 - normal\par S1 - normal\par \par Motor\par 	IP	Quad	HS	TA	Gastroc	EHL	\par Right	5/5	5/5	5/5	5/5	5/5	5/5	\par Left	5/5	5/5	5/5	5/5	5/5	5/5	\par \par Skin clean, dry and intact with no ecchymosis to the cervical, thoracic or lumbar region. She had mild midline and para musculature of the thoracic vertebrae. \par  [de-identified] : Scoliosis radiographs\par Hardware in appropriate position\par No acute complication\par \par CT scan reviewed\par No new fractures noted \par hardware is stable

## 2022-09-07 ENCOUNTER — APPOINTMENT (OUTPATIENT)
Dept: ORTHOPEDIC SURGERY | Facility: CLINIC | Age: 67
End: 2022-09-07

## 2023-04-12 NOTE — ED ADULT NURSE NOTE - TOBACCO USE
SW met with patient at bedside who reported that she is not current with a home health agency but would like a referral sent to [facility]. Patient has worked with them in the past and liked their services. SW sent referral for [facility] via [employer]. Patient has a caregiver on tuesdays and thursdays that assists her with ADL's and denied needing any DME at home. [facility] will continue to follow for approval from [facility]. SW will continue to follow for plan of care changes and remain available for any additional DC needs or concerns.      Any Helms MSW, LSW  Discharge Planner   [phone] Never smoker

## 2023-06-12 ENCOUNTER — NON-APPOINTMENT (OUTPATIENT)
Age: 68
End: 2023-06-12

## 2024-03-29 NOTE — PROGRESS NOTE ADULT - PROBLEM SELECTOR PLAN 4
Patient received  a suppository with some relief  suggested lactulose PO   continue a GI regime   ambulate as tolerated
Connect patient to Primary Care...

## 2025-04-30 NOTE — DISCHARGE NOTE NURSING/CASE MANAGEMENT/SOCIAL WORK - CONTRAINDICATIONS & PRECAUTIONS (SELECT ALL THAT APPLY)
You may receive a survey regarding the care you received during your visit.  Your input is valuable to us.  We encourage you to complete and return your survey.  We hope you will choose us in the future for your healthcare needs.    none...